# Patient Record
Sex: FEMALE | Race: OTHER | HISPANIC OR LATINO | Employment: OTHER | URBAN - METROPOLITAN AREA
[De-identification: names, ages, dates, MRNs, and addresses within clinical notes are randomized per-mention and may not be internally consistent; named-entity substitution may affect disease eponyms.]

---

## 2017-01-03 ENCOUNTER — OFFICE VISIT (OUTPATIENT)
Dept: MEDICAL GROUP | Facility: MEDICAL CENTER | Age: 67
End: 2017-01-03
Payer: COMMERCIAL

## 2017-01-03 VITALS
HEART RATE: 75 BPM | DIASTOLIC BLOOD PRESSURE: 78 MMHG | SYSTOLIC BLOOD PRESSURE: 122 MMHG | BODY MASS INDEX: 19.14 KG/M2 | WEIGHT: 104 LBS | HEIGHT: 62 IN | OXYGEN SATURATION: 98 % | TEMPERATURE: 99.2 F | RESPIRATION RATE: 14 BRPM

## 2017-01-03 DIAGNOSIS — K56.50 SMALL BOWEL OBSTRUCTION DUE TO ADHESIONS (HCC): ICD-10-CM

## 2017-01-03 DIAGNOSIS — Z09 HOSPITAL DISCHARGE FOLLOW-UP: ICD-10-CM

## 2017-01-03 DIAGNOSIS — R91.1 LUNG NODULE: ICD-10-CM

## 2017-01-03 DIAGNOSIS — D25.9 UTERINE LEIOMYOMA, UNSPECIFIED LOCATION: ICD-10-CM

## 2017-01-03 DIAGNOSIS — E87.1 HYPONATREMIA: ICD-10-CM

## 2017-01-03 DIAGNOSIS — D64.9 ANEMIA, UNSPECIFIED TYPE: ICD-10-CM

## 2017-01-03 DIAGNOSIS — K76.89 LIVER CYST: ICD-10-CM

## 2017-01-03 DIAGNOSIS — R10.30 ABDOMINAL PAIN, LOWER: ICD-10-CM

## 2017-01-03 DIAGNOSIS — Z00.00 HEALTH CARE MAINTENANCE: ICD-10-CM

## 2017-01-03 PROCEDURE — 99215 OFFICE O/P EST HI 40 MIN: CPT | Performed by: INTERNAL MEDICINE

## 2017-01-03 NOTE — MR AVS SNAPSHOT
"Alysa Tabatha Sarabia   1/3/2017 9:20 AM   Office Visit   MRN: [id number]    Department:  [place] [address]   Dept Phone:  [phone]    Description:  Female : 1950   Provider:  Darvin Brennan M.D.           Reason for Visit     Hospital Follow-up intestine obstruction       Allergies as of 1/3/2017     Allergen Noted Reactions    Diltiazem 2013   Rash    Skin rash      You were diagnosed with     Hospital discharge follow-up   [312089]       Small bowel obstruction due to adhesions (HCC)   [628945]       Anemia, unspecified type   [7723361]       Hyponatremia   [676870]       Health care maintenance   [096581]       Liver cyst   [496820]       Lung nodule   [189335]       Abdominal pain, lower   [829731]         Vital Signs     Blood Pressure Pulse Temperature Respirations Height Weight    122/78 mmHg 75 37.3 °C (99.2 °F) 14 1.575 m (5' 2\") 47.174 kg (104 lb)    Body Mass Index Oxygen Saturation Smoking Status             19.02 kg/m2 98% Never Smoker          Basic Information     Date Of Birth Sex Race Ethnicity Preferred Language    1950 Female  or   Origin (Beninese,North Korean,Slovak,Mauritian, etc) English      Problem List              ICD-10-CM Priority Class Noted - Resolved    Hyponatremia E87.1 Low  2012 - Present    Colon tumor D49.0 High  3/27/2014 - Present    Essential hypertension I10   9/3/2015 - Present    Colitis K52.9   2016 - Present    Small bowel obstruction due to adhesions (HCC) K56.5 Medium  5/3/2016 - Present    Protein-calorie malnutrition, severe (HCC) E43 High  2016 - Present    Ascites R18.8 Medium  2016 - Present    Hydronephrosis of right kidney N13.30 Medium  2016 - Present    Health care maintenance Z00.00   2016 - Present    Hyponatremia E87.1 Low  2016 - Present    Hypokalemia E87.6 Low  2016 - Present    Hyponatremia E87.1 Low  12/15/2016 - Present    Hypokalemia E87.6 Low  12/15/2016 - " Present      Health Maintenance        Date Due Completion Dates    PAP SMEAR 2/5/2016 2/5/2013 (N/S)    Override on 2/5/2013: (N/S)    MAMMOGRAM 8/1/2017 8/1/2016, 5/6/2015, 5/5/2014, 5/24/2013, 4/11/2013, 4/5/2012, 12/10/2010, 11/20/2009, 11/21/2008, 11/21/2008, 2/8/2008, 2/8/2008, 11/30/2007, 11/30/2007, 5/2/2007, 5/2/2007, 5/2/2007, 5/2/2007, 5/2/2007, 3/14/2007, 3/1/2007, 3/1/2007    COLONOSCOPY 12/15/2023 12/15/2013    IMM DTaP/Tdap/Td Vaccine (2 - Td) 7/31/2024 7/31/2014            Current Immunizations     Influenza TIV (IM) 3/27/2014  6:21 PM, 11/2/2012  4:36 PM    Influenza Vaccine Quad Inj (Pf) 12/5/2016  5:33 PM    Pneumococcal polysaccharide vaccine (PPSV-23) 12/15/2016  3:37 AM    SHINGLES VACCINE 11/5/2012  4:25 PM    Tdap Vaccine 7/31/2014      Below and/or attached are the medications your provider expects you to take. Review all of your home medications and newly ordered medications with your provider and/or pharmacist. Follow medication instructions as directed by your provider and/or pharmacist. Please keep your medication list with you and share with your provider. Update the information when medications are discontinued, doses are changed, or new medications (including over-the-counter products) are added; and carry medication information at all times in the event of emergency situations     Allergies:  DILTIAZEM - Rash               Medications  Valid as of: January 03, 2017 -  9:53 AM    Generic Name Brand Name Tablet Size Instructions for use    Aspirin (Tablet Delayed Response) ECOTRIN 325 MG Take 1 Tab by mouth every day.        Calcium   Take 1 Cap by mouth every day.        Cholecalciferol (Tab) cholecalciferol 1000 UNIT Take 1,000 Units by mouth every day.        Lutein   Take  by mouth.        Multiple Vitamin (Tab) THERAGRAN  Take 1 Tab by mouth every day.        Valsartan (Tab) DIOVAN 320 MG Take 1 Tab by mouth every day.        .                 Medicines prescribed today were [facility]  to:     [facility] [place] [place] - [address] [place] [address]    Phone: [phone] Fax: [phone]    Open 24 Hours?: No      Medication refill instructions:       If your prescription bottle indicates you have medication refills left, it is not necessary to call your provider’s office. Please contact your pharmacy and they will refill your medication.    If your prescription bottle indicates you do not have any refills left, you may request refills at any time through one of the following ways: The online [employer] system (except Urgent Care), by calling your provider’s office, or by asking your pharmacy to contact your provider’s office with a refill request. Medication refills are processed only during regular business hours and may not be available until the next business day. Your provider may request additional information or to have a follow-up visit with you prior to refilling your medication.   *Please Note: Medication refills are assigned a new Rx number when refilled electronically. Your pharmacy may indicate that no refills were authorized even though a new prescription for the same medication is available at the pharmacy. Please request the medicine by name with the pharmacy before contacting your provider for a refill.        Your To Do List     Future Labs/Procedures Complete By Expires    CBC WITH DIFFERENTIAL  As directed 1/4/2018    COMP METABOLIC PANEL  As directed 1/4/2018    CT-CHEST (THORAX) W/O  As directed 7/6/2017    URINALYSIS,CULTURE IF INDICATED  As directed 1/3/2018    US-ABDOMEN COMPLETE SURVEY  As directed 7/6/2017         [employer] Access Code: Activation code not generated  Current [employer] Status: Active

## 2017-01-03 NOTE — PROGRESS NOTES
CHIEF COMPLAINT  Chief Complaint   Patient presents with   • Hospital Follow-up     intestine obstruction     hospital discharge x 2 follow-up    HPI  Patient is a 66 y.o. female patient, accompanied by her so,n,  who presents today for the following     Recurrent small bowel obstruction due to adhesions  The patient was hospitalized twice in December 2016 due to intestinal obstruction    Hospitalization 1, from 12/3/16 to 12/8/16  · Partial small bowel obstruction, conservative treatment.  · IVF  · NG    · CT performed reviewed consistent with partial obstruction.   · Labs significant for  · Development of anemia, stable  · Development of hyponatremia, remained stable  · Imaging significant for:  · Calcified uterine fibroid  · Liver cysts  · 4 mm left low for lobe pulmonary nodule    Hospitalization 2 from 12/14/16  To 12/18/16   Diagnosed as:  1.  Small bowel obstruction due to adhesions on CT, abdomen  2. Paroxysmal anastomotic stricture  3.   - Laparotomy and adhesiolysis/dissection was performed on admission day  - Postoperative course was unremarkable,     - she was gradually switched from IV fluids to by mouth intake of fluids and foods    Reviewed previous pertinent history:  5/3 - CT imaging with multiple loops of small bowel encased in phlegmon and abscess in retroperitoneum.     5/4 - Small bowel resection and resection of previous ileocolostomy. Abraded small bowel spared to preserve bowel.     5/5 - Final cultures Viridans Streptococcus. Pathology with no evidence of malignancy. Completed 7 days of Ceftriaxone.    12/3 - CT imaging with dilated small bowel in the left abdomen likely represents partial small bowel obstruction.  12/5 Abdominal x-ray - No dilated bowel; Contrast within the bowel and bladder.    12/6 -  NGT removed. Clear liquid diet  12/7 - Advance diet.  General Surgery - Dr. Reddy      She remained stable after the discharge.   Currently, she complains of intermittent mild to moderate  right lower quadrant pain.  She has a regular BMs, and passing gas.   Denies vomiting.    Reviewed PMH, PSH, FH, SH, ALL, HCM/IMM, no changes  Reviewed MEDS, no changes    Patient Active Problem List    Diagnosis Date Noted   • Protein-calorie malnutrition, severe (HCC) 05/07/2016     Priority: High   • Colon tumor 03/27/2014     Priority: High   • Hydronephrosis of right kidney 05/13/2016     Priority: Medium   • Ascites 05/12/2016     Priority: Medium   • Small bowel obstruction due to adhesions (HCC) 05/03/2016     Priority: Medium   • Hyponatremia 12/15/2016     Priority: Low   • Hypokalemia 12/15/2016     Priority: Low   • Hyponatremia 12/06/2016     Priority: Low   • Hypokalemia 12/06/2016     Priority: Low   • Hyponatremia 11/01/2012     Priority: Low   • Health care maintenance 07/22/2016   • Colitis 04/23/2016   • Essential hypertension 09/03/2015     CURRENT MEDICATIONS  Current Outpatient Prescriptions   Medication Sig Dispense Refill   • LUTEIN PO Take  by mouth.     • valsartan (DIOVAN) 320 MG tablet Take 1 Tab by mouth every day. 30 Tab 0   • aspirin EC (ECOTRIN) 325 MG Tablet Delayed Response Take 1 Tab by mouth every day. 10 Tab 0   • CALCIUM PO Take 1 Cap by mouth every day.     • multivitamin (THERAGRAN) Tab Take 1 Tab by mouth every day.     • vitamin D (CHOLECALCIFEROL) 1000 UNIT TABS Take 1,000 Units by mouth every day.       No current facility-administered medications for this visit.     ALLERGIES  Allergies: Diltiazem  PAST MEDICAL HISTORY  Past Medical History   Diagnosis Date   • Hypertension    • Hyponatremia    • Small bowel obstruction (HCC)      SURGICAL HISTORY  She  has past surgical history that includes lumpectomy; breast biopsy (2007); colon resection laparoscopic (3/27/2014); exploratory laparotomy (5/3/2016); cystoscopy stent placement (Right, 5/13/2016); retrogrades (5/13/2016); and exploratory laparotomy (12/14/2016).  SOCIAL HISTORY  Social History   Substance Use Topics   •  "Smoking status: Never Smoker    • Smokeless tobacco: Never Used   • Alcohol Use: 4.2 oz/week     7 Standard drinks or equivalent per week      Comment: 4 per week     Social History     Social History Narrative    Lives with .     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  HENT: Negative for congestion, sore throat.  Eyes: Negative for blurred vision.   Respiratory: Negative for cough, shortness of breath.  Cardiovascular: Negative for chest pain, palpitations.   Gastrointestinal: Negative for heartburn, nausea, abdominal pain.   Genitourinary: Negative for dysuria.  Musculoskeletal: Negative for significant myalgias, back pain and joint pain.   Skin: Negative for rash and itching.   Neuro: Negative for dizziness, weakness and headaches.   Endo/Heme/Allergies: Does not bruise/bleed easily.   Psychiatric/Behavioral: Negative for depression, anxiety    PHYSICAL EXAM   Blood pressure 122/78, pulse 75, temperature 37.3 °C (99.2 °F), resp. rate 14, height 1.575 m (5' 2\"), weight 47.174 kg (104 lb), SpO2 98 %. Body mass index is 19.02 kg/(m^2).  General:  NAD, well appearing  HEENT:   NC/AT, PERRLA, EOMI, TMs are clear. Oropharyngeal mucosa is pink,  without lesions;  no cervical / supraclavicular  lymphadenopathy, no thyromegaly.    Cardiovascular: RRR.   No m/r/g. No carotid bruits .      Lungs:   CTAB, no w/r/r, no respiratory distress.  Abdomen: Soft, NT/ND + BS; no suprapubic tenderness; no masses or " hepatosplenomegaly.  Extremities:  2+ DP and radial pulses bilaterally.  No c/c/e.   Skin:  Warm, dry.  No erythema. No rash.   Neurologic: Alert & oriented x 3.  No focal deficits.  Psychiatric:  Affect normal, mood normal, judgment normal.    LABS     Labs are reviewed and discussed with a patient    Lab Results   Component Value Date/Time    CHOLESTEROL, 10/08/2016 08:07 AM    LDL 96 10/08/2016 08:07 AM    HDL 73 10/08/2016 08:07 AM    TRIGLYCERIDES 87 10/08/2016 08:07 AM       Lab Results   Component Value Date/Time    SODIUM 130* 12/16/2016 02:24 AM    POTASSIUM 3.7 12/16/2016 02:24 AM    CHLORIDE 99 12/16/2016 02:24 AM    CO2 28 12/16/2016 02:24 AM    GLUCOSE 123* 12/16/2016 02:24 AM    BUN 3* 12/16/2016 02:24 AM    CREATININE 0.36* 12/16/2016 02:24 AM     Lab Results   Component Value Date/Time    ALKALINE PHOSPHATASE 42 12/16/2016 02:24 AM    AST(SGOT) 13 12/16/2016 02:24 AM    ALT(SGPT) 11 12/16/2016 02:24 AM    TOTAL BILIRUBIN 0.6 12/16/2016 02:24 AM      No results found for: HBA1C  No results found for: TSH  No results found for: FREET4    Lab Results   Component Value Date/Time    WBC 6.7 12/16/2016 02:24 AM    RBC 2.94* 12/16/2016 02:24 AM    HEMOGLOBIN 9.2* 12/16/2016 02:24 AM    HEMATOCRIT 27.1* 12/16/2016 02:24 AM    MCV 92.2 12/16/2016 02:24 AM    MCH 31.3 12/16/2016 02:24 AM    MCHC 33.9 12/16/2016 02:24 AM    MPV 8.8* 12/16/2016 02:24 AM    NEUTROPHILS-POLYS 77.00* 12/16/2016 02:24 AM    LYMPHOCYTES 15.40* 12/16/2016 02:24 AM    MONOCYTES 6.60 12/16/2016 02:24 AM    EOSINOPHILS 0.30 12/16/2016 02:24 AM    BASOPHILS 0.30 12/16/2016 02:24 AM    ANISOCYTOSIS 1+ 05/05/2016 04:10 AM      IMAGING   Review the following imaging:    CT abdomen and pelvis, 12/3/16  Dilated small bowel in the left abdomen likely represents partial small bowel obstruction.    Wall thickening of small bowel in the right abdomen may may be infectious or inflammatory. Ischemia is thought to be less  likely.    Atherosclerotic plaque.    Calcified pelvic mass likely represents a calcified uterine fibroid.    Hypodense hepatic lesions likely represent cysts.    Postsurgical changes status post right hemicolectomy. Extensive postsurgical changes are seen in the abdomen.    Colonic diverticula.    Prominent periuterine veins can be seen in the setting of pelvic congestion syndrome.    4 mm ill-defined nodule at the left lung base is unchanged compared to 4/23/2016. Follow-up is recommended.    Groundglass opacities at the left lung base may be infectious or inflammatory.         CT abdomen, 12/14/16:  Dilated loops of small bowel again noted which could indicate partial small bowel obstruction. Air-fluid levels are also present.        2.   Post right hemicolectomy changes again noted.        3.  Enlarged uterus containing coarse calcifications again noted.        4.  No change in 4 mm nodule in left lung base.        5.  Small benign-appearing liver lesions again identified.            ASSESMENT AND PLAN        1. Hospital discharge follow-up  She remained stable, except some right lower abdominal pain.   Abdominal exam was benign, except some mild tenderness to palpation.   Advised to contact her surgeon due to this issue.     2. Small bowel obstruction due to adhesions (HCC)  As above    3. Anemia, unspecified type  Follow-up CBC  - CBC WITH DIFFERENTIAL; Future    4. Hyponatremia  Follow-up CMP  - COMP METABOLIC PANEL; Future    5. Health care maintenance  Addressed    6. Liver cyst  Follow-up in 3 months  - US-ABDOMEN COMPLETE SURVEY; Future    7. Lung nodule  Follow-up in 3 months  - CT-CHEST (THORAX) W/O; Future    8. Abdominal pain, lower  Advised to contact surgery and check UA  - URINALYSIS,CULTURE IF INDICATED; Future    9. Uterine leiomyoma, unspecified location  Calcified.  Counseling:   - Smoking:  Nonsmoker    Followup: Return in about 2 months (around 3/3/2017).    All questions are  answered.    Time spent 40 minutes face to face, with > 50% spent counseling and coordinating care.      Please note that this dictation was created using voice recognition software, and that there might be errors of ashok and possibly content.

## 2017-01-03 NOTE — Clinical Note
[facility]  Darvin Brennan M.D.  [address]  [place] [address]  Fax: [phone] Authorization for Release/Disclosure of Protected Health Information   Name: ALYSA SARABIA : 1950 SSN: [id number]   Address: [address] [place] [address]  [place] [address] Phone:    [phone] (home)    I authorize the entity listed below to release/disclose the PHI below to [facility]/Darvin Brennan M.D.   Provider or Entity Name:       Address   City, State, [facility]   Phone:      Fax:     Reason for request: continuity of care   Information to be released:    [  ] LAST COLONOSCOPY, including any PATH REPORT [  ] LAST DEXA  [  ] LAST MAMMOGRAM  [XXX  ] LAST PAP [  ] RETINA EXAM REPORT  [  ] IMMUNIZATION RECORDS  [  ] Release all info      [  ] Check here and initial the line next to each item to release ALL health information INCLUDING  _____ Care and treatment for drug and / or alcohol abuse  _____ HIV testing, infection status, or AIDS  _____ Genetic Testing    DATES OF SERVICE OR TIME PERIOD TO BE DISCLOSED: _____________  I understand and acknowledge that:  * This Authorization may be revoked at any time by you in writing, except if your health information has already been used or disclosed.  * Your health information that will be used or disclosed as a result of you signing this authorization could be re-disclosed by the recipient. If this occurs, your re-disclosed health information may no longer be protected by State or Federal laws.  * You may refuse to sign this Authorization. Your refusal will not affect your ability to obtain treatment.  * This Authorization becomes effective upon signing and will  on (date) __________. If no date is indicated, this Authorization will  one (1) year from the signature date.    Name: Alysa Sarabia    Signature:     Date: 1/3/2017

## 2017-01-03 NOTE — Clinical Note
[facility]  [address]  [facility] [place] [address]     January 3, 2017    Patient: Alysa Sarabia   YOB: 1950   Date of Visit: 1/3/2017                         To Whom It May Concern:    Alysa Sarabia was seen and treated in our department on 1/3/2017.     Excuse from work from today, 1/3/2017 until 1/16/2017.      Sincerely,       Darvin Brennan M.D.

## 2017-01-11 ENCOUNTER — HOSPITAL ENCOUNTER (OUTPATIENT)
Dept: LAB | Facility: MEDICAL CENTER | Age: 67
End: 2017-01-11
Attending: INTERNAL MEDICINE
Payer: COMMERCIAL

## 2017-01-11 DIAGNOSIS — D64.9 ANEMIA, UNSPECIFIED TYPE: ICD-10-CM

## 2017-01-11 DIAGNOSIS — R10.30 ABDOMINAL PAIN, LOWER: ICD-10-CM

## 2017-01-11 DIAGNOSIS — E87.1 HYPONATREMIA: ICD-10-CM

## 2017-01-11 LAB
ALBUMIN SERPL BCP-MCNC: 4.2 G/DL (ref 3.2–4.9)
ALBUMIN/GLOB SERPL: 1.4 G/DL
ALP SERPL-CCNC: 56 U/L (ref 30–99)
ALT SERPL-CCNC: 11 U/L (ref 2–50)
ANION GAP SERPL CALC-SCNC: 8 MMOL/L (ref 0–11.9)
APPEARANCE UR: CLEAR
AST SERPL-CCNC: 14 U/L (ref 12–45)
BASOPHILS # BLD AUTO: 0.05 K/UL (ref 0–0.12)
BASOPHILS NFR BLD AUTO: 1.5 % (ref 0–1.8)
BILIRUB SERPL-MCNC: 0.9 MG/DL (ref 0.1–1.5)
BILIRUB UR QL STRIP.AUTO: NEGATIVE
BUN SERPL-MCNC: 8 MG/DL (ref 8–22)
CALCIUM SERPL-MCNC: 9.4 MG/DL (ref 8.5–10.5)
CHLORIDE SERPL-SCNC: 100 MMOL/L (ref 96–112)
CO2 SERPL-SCNC: 28 MMOL/L (ref 20–33)
COLOR UR AUTO: YELLOW
CREAT SERPL-MCNC: 0.51 MG/DL (ref 0.5–1.4)
CULTURE IF INDICATED INDCX: NO UA CULTURE
EOSINOPHIL # BLD: 0.07 K/UL (ref 0–0.51)
EOSINOPHIL NFR BLD AUTO: 2.1 % (ref 0–6.9)
EPITHELIAL CELLS 1715: ABNORMAL /HPF
ERYTHROCYTE [DISTWIDTH] IN BLOOD BY AUTOMATED COUNT: 44.2 FL (ref 35.9–50)
GLOBULIN SER CALC-MCNC: 3.1 G/DL (ref 1.9–3.5)
GLUCOSE SERPL-MCNC: 88 MG/DL (ref 65–99)
GLUCOSE UR STRIP.AUTO-MCNC: NEGATIVE MG/DL
HCT VFR BLD AUTO: 39.6 % (ref 37–47)
HGB BLD-MCNC: 13.5 G/DL (ref 12–16)
IMM GRANULOCYTES # BLD AUTO: 0 K/UL (ref 0–0.11)
IMM GRANULOCYTES NFR BLD AUTO: 0 % (ref 0–0.9)
KETONES UR STRIP.AUTO-MCNC: NEGATIVE MG/DL
LEUKOCYTE ESTERASE UR QL STRIP.AUTO: NEGATIVE
LYMPHOCYTES # BLD: 1.22 K/UL (ref 1–4.8)
LYMPHOCYTES NFR BLD AUTO: 37.2 % (ref 22–41)
MCH RBC QN AUTO: 32.1 PG (ref 27–33)
MCHC RBC AUTO-ENTMCNC: 34.1 G/DL (ref 33.6–35)
MCV RBC AUTO: 94.3 FL (ref 81.4–97.8)
MICRO URNS: ABNORMAL
MONOCYTES # BLD: 0.25 K/UL (ref 0–0.85)
MONOCYTES NFR BLD AUTO: 7.6 % (ref 0–13.4)
NEUTROPHILS # BLD: 1.69 K/UL (ref 2–7.15)
NEUTROPHILS NFR BLD AUTO: 51.6 % (ref 44–72)
NITRITE UR QL STRIP.AUTO: NEGATIVE
NRBC # BLD AUTO: 0 K/UL
NRBC BLD-RTO: 0 /100 WBC
PH UR: 6.5 [PH]
PLATELET # BLD AUTO: 424 K/UL (ref 164–446)
PMV BLD AUTO: 9 FL (ref 9–12.9)
POTASSIUM SERPL-SCNC: 4.1 MMOL/L (ref 3.6–5.5)
PROT SERPL-MCNC: 7.3 G/DL (ref 6–8.2)
PROT UR QL STRIP: NEGATIVE MG/DL
RBC # BLD AUTO: 4.2 M/UL (ref 4.2–5.4)
RBC #/AREA URNS HPF: ABNORMAL /HPF
RBC UR QL AUTO: ABNORMAL
SODIUM SERPL-SCNC: 136 MMOL/L (ref 135–145)
SP GR UR STRIP.AUTO: 1.01
WBC # BLD AUTO: 3.3 K/UL (ref 4.8–10.8)
WBC #/AREA URNS HPF: ABNORMAL /HPF

## 2017-01-11 PROCEDURE — 81001 URINALYSIS AUTO W/SCOPE: CPT

## 2017-01-11 PROCEDURE — 85025 COMPLETE CBC W/AUTO DIFF WBC: CPT

## 2017-01-11 PROCEDURE — 80053 COMPREHEN METABOLIC PANEL: CPT

## 2017-01-11 PROCEDURE — 36415 COLL VENOUS BLD VENIPUNCTURE: CPT

## 2017-01-12 DIAGNOSIS — D70.9 NEUTROPENIA, UNSPECIFIED TYPE (HCC): ICD-10-CM

## 2017-01-16 DIAGNOSIS — D72.819 LEUKOPENIA, UNSPECIFIED TYPE: ICD-10-CM

## 2017-01-23 RX ORDER — VALSARTAN 320 MG/1
TABLET ORAL
Qty: 90 TAB | Refills: 0 | Status: SHIPPED | OUTPATIENT
Start: 2017-01-23 | End: 2017-03-29 | Stop reason: SDUPTHER

## 2017-01-23 NOTE — TELEPHONE ENCOUNTER
Was the patient seen in the last year in this department? Yes     Does patient have an active prescription for medications requested? Yes     Received Request Via: Pharmacy    Last office visit: 01/03/2017   Last labs: 01/11/2017

## 2017-02-04 ENCOUNTER — HOSPITAL ENCOUNTER (OUTPATIENT)
Dept: RADIOLOGY | Facility: MEDICAL CENTER | Age: 67
End: 2017-02-04
Attending: INTERNAL MEDICINE
Payer: COMMERCIAL

## 2017-02-04 DIAGNOSIS — R91.1 LUNG NODULE: ICD-10-CM

## 2017-02-04 DIAGNOSIS — K76.89 LIVER CYST: ICD-10-CM

## 2017-02-04 PROCEDURE — 71250 CT THORAX DX C-: CPT

## 2017-02-04 PROCEDURE — 76700 US EXAM ABDOM COMPLETE: CPT

## 2017-03-01 ENCOUNTER — HOSPITAL ENCOUNTER (OUTPATIENT)
Dept: LAB | Facility: MEDICAL CENTER | Age: 67
End: 2017-03-01
Attending: INTERNAL MEDICINE
Payer: COMMERCIAL

## 2017-03-01 DIAGNOSIS — D70.9 NEUTROPENIA, UNSPECIFIED TYPE (HCC): ICD-10-CM

## 2017-03-01 LAB
BASOPHILS # BLD AUTO: 0.04 K/UL (ref 0–0.12)
BASOPHILS NFR BLD AUTO: 1.3 % (ref 0–1.8)
EOSINOPHIL # BLD: 0.06 K/UL (ref 0–0.51)
EOSINOPHIL NFR BLD AUTO: 1.9 % (ref 0–6.9)
ERYTHROCYTE [DISTWIDTH] IN BLOOD BY AUTOMATED COUNT: 42.6 FL (ref 35.9–50)
HCT VFR BLD AUTO: 41.1 % (ref 37–47)
HGB BLD-MCNC: 13.5 G/DL (ref 12–16)
IMM GRANULOCYTES # BLD AUTO: 0.02 K/UL (ref 0–0.11)
IMM GRANULOCYTES NFR BLD AUTO: 0.6 % (ref 0–0.9)
LYMPHOCYTES # BLD: 1.19 K/UL (ref 1–4.8)
LYMPHOCYTES NFR BLD AUTO: 37.5 % (ref 22–41)
MCH RBC QN AUTO: 30.5 PG (ref 27–33)
MCHC RBC AUTO-ENTMCNC: 32.8 G/DL (ref 33.6–35)
MCV RBC AUTO: 93 FL (ref 81.4–97.8)
MONOCYTES # BLD: 0.24 K/UL (ref 0–0.85)
MONOCYTES NFR BLD AUTO: 7.6 % (ref 0–13.4)
NEUTROPHILS # BLD: 1.62 K/UL (ref 2–7.15)
NEUTROPHILS NFR BLD AUTO: 51.1 % (ref 44–72)
NRBC # BLD AUTO: 0 K/UL
NRBC BLD-RTO: 0 /100 WBC
PLATELET # BLD AUTO: 391 K/UL (ref 164–446)
PMV BLD AUTO: 9.1 FL (ref 9–12.9)
RBC # BLD AUTO: 4.42 M/UL (ref 4.2–5.4)
WBC # BLD AUTO: 3.2 K/UL (ref 4.8–10.8)

## 2017-03-01 PROCEDURE — 36415 COLL VENOUS BLD VENIPUNCTURE: CPT

## 2017-03-01 PROCEDURE — 85025 COMPLETE CBC W/AUTO DIFF WBC: CPT

## 2017-03-02 ENCOUNTER — TELEPHONE (OUTPATIENT)
Dept: MEDICAL GROUP | Facility: MEDICAL CENTER | Age: 67
End: 2017-03-02

## 2017-03-02 NOTE — TELEPHONE ENCOUNTER
----- Message from Libertad Villa PA-C sent at 3/1/2017  5:58 PM PST -----  Please inform Dr. Humphrey's pt that her labs are still the same, not much change so we should check them again in 1 more month.   Labs were ordered.   Libertad

## 2017-03-06 ENCOUNTER — OFFICE VISIT (OUTPATIENT)
Dept: MEDICAL GROUP | Facility: MEDICAL CENTER | Age: 67
End: 2017-03-06
Payer: COMMERCIAL

## 2017-03-06 VITALS
DIASTOLIC BLOOD PRESSURE: 64 MMHG | HEIGHT: 62 IN | WEIGHT: 100.75 LBS | SYSTOLIC BLOOD PRESSURE: 116 MMHG | BODY MASS INDEX: 18.54 KG/M2 | TEMPERATURE: 98.4 F | HEART RATE: 69 BPM | OXYGEN SATURATION: 95 %

## 2017-03-06 DIAGNOSIS — Z00.00 HEALTH CARE MAINTENANCE: ICD-10-CM

## 2017-03-06 DIAGNOSIS — N13.30 HYDRONEPHROSIS OF RIGHT KIDNEY: ICD-10-CM

## 2017-03-06 DIAGNOSIS — R91.1 LUNG NODULE: ICD-10-CM

## 2017-03-06 DIAGNOSIS — D70.9 NEUTROPENIA, UNSPECIFIED TYPE (HCC): ICD-10-CM

## 2017-03-06 DIAGNOSIS — K76.89 LIVER CYST: ICD-10-CM

## 2017-03-06 PROBLEM — Z87.19 HISTORY OF SMALL BOWEL OBSTRUCTION: Status: ACTIVE | Noted: 2017-03-06

## 2017-03-06 PROCEDURE — 99214 OFFICE O/P EST MOD 30 MIN: CPT | Performed by: INTERNAL MEDICINE

## 2017-03-06 NOTE — MR AVS SNAPSHOT
"        Alysa Sarabia   3/6/2017 9:20 AM   Office Visit   MRN: [id number]    Department:  [place] [address]   Dept Phone:  [phone]    Description:  Female : 1950   Provider:  Darvin Brennan M.D.           Reason for Visit     Follow-Up lab/ imaging results       Allergies as of 3/6/2017     Allergen Noted Reactions    Diltiazem 2013   Rash    Skin rash      You were diagnosed with     Neutropenia, unspecified type (CMS-HCC)   [8386581]       Essential hypertension   [2989281]       Lung nodule   [056913]       Liver cyst   [773468]       History of small bowel obstruction   [619956]       Health care maintenance   [961328]       Hydronephrosis of right kidney   [611211]         Vital Signs     Blood Pressure Pulse Temperature Height Weight Body Mass Index    116/64 mmHg 69 36.9 °C (98.4 °F) 1.575 m (5' 2\") 45.7 kg (100 lb 12 oz) 18.42 kg/m2    Oxygen Saturation Smoking Status                95% Never Smoker           Basic Information     Date Of Birth Sex Race Ethnicity Preferred Language    1950 Female  or   Origin (Azeri,Guyanese,Nauruan,Lao, etc) English      Your appointments     2017  1:00 PM   Established Patient with Darvin Brennan M.D.   [facility]    [address]  [address]  [place] [id number] [phone]           You will be receiving a confirmation call a few days before your appointment from our automated call confirmation system.              Problem List              ICD-10-CM Priority Class Noted - Resolved    Colon tumor D49.0 High  3/27/2014 - Present    Essential hypertension I10   9/3/2015 - Present    Ascites R18.8 Medium  2016 - Present    Hydronephrosis of right kidney N13.30 Medium  2016 - Present    Health care maintenance Z00.00   2016 - Present    Uterine leiomyoma D25.9   1/3/2017 - Present    History of small bowel obstruction Z87.19   " 3/6/2017 - Present      Health Maintenance        Date Due Completion Dates    PAP SMEAR 2/5/2016 2/5/2013 (N/S)    Override on 2/5/2013: (N/S)    MAMMOGRAM 8/1/2017 8/1/2016, 5/6/2015, 5/5/2014, 4/11/2013, 4/5/2012, 12/10/2010, 11/20/2009, 11/21/2008, 11/21/2008, 2/8/2008, 2/8/2008, 11/30/2007, 11/30/2007, 3/14/2007, 3/1/2007, 3/1/2007    COLONOSCOPY 12/15/2023 12/15/2013    IMM DTaP/Tdap/Td Vaccine (2 - Td) 7/31/2024 7/31/2014            Current Immunizations     Influenza TIV (IM) 3/27/2014  6:21 PM, 11/2/2012  4:36 PM    Influenza Vaccine Quad Inj (Pf) 12/5/2016  5:33 PM    Pneumococcal polysaccharide vaccine (PPSV-23) 12/15/2016  3:37 AM    SHINGLES VACCINE 11/5/2012  4:25 PM    Tdap Vaccine 7/31/2014      Below and/or attached are the medications your provider expects you to take. Review all of your home medications and newly ordered medications with your provider and/or pharmacist. Follow medication instructions as directed by your provider and/or pharmacist. Please keep your medication list with you and share with your provider. Update the information when medications are discontinued, doses are changed, or new medications (including over-the-counter products) are added; and carry medication information at all times in the event of emergency situations     Allergies:  DILTIAZEM - Rash               Medications  Valid as of: March 06, 2017 - 10:06 AM    Generic Name Brand Name Tablet Size Instructions for use    Aspirin (Tablet Delayed Response) ECOTRIN 325 MG Take 1 Tab by mouth every day.        Calcium   Take 1 Cap by mouth every day.        Cholecalciferol (Tab) cholecalciferol 1000 UNIT Take 1,000 Units by mouth every day.        Lutein   Take  by mouth.        Multiple Vitamin (Tab) THERAGRAN  Take 1 Tab by mouth every day.        Valsartan (Tab) DIOVAN 320 MG TAKE ONE TABLET BY MOUTH EVERY DAY        .                 Medicines prescribed today were sent to:     [facility] -  [address]    [address] [address]    Phone: [phone] Fax: [phone]    Open 24 Hours?: No      Medication refill instructions:       If your prescription bottle indicates you have medication refills left, it is not necessary to call your provider’s office. Please contact your pharmacy and they will refill your medication.    If your prescription bottle indicates you do not have any refills left, you may request refills at any time through one of the following ways: The online [employer] system (except Urgent Care), by calling your provider’s office, or by asking your pharmacy to contact your provider’s office with a refill request. Medication refills are processed only during regular business hours and may not be available until the next business day. Your provider may request additional information or to have a follow-up visit with you prior to refilling your medication.   *Please Note: Medication refills are assigned a new Rx number when refilled electronically. Your pharmacy may indicate that no refills were authorized even though a new prescription for the same medication is available at the pharmacy. Please request the medicine by name with the pharmacy before contacting your provider for a refill.        Your To Do List     Future Labs/Procedures Complete By Expires    BASIC METABOLIC PANEL  As directed 3/7/2018    CBC WITH DIFFERENTIAL  As directed 3/7/2018    US-ABDOMEN COMPLETE SURVEY  As directed 9/6/2017      Referral     A referral request has been sent to our patient care coordination department. Please allow 3-5 business days for us to process this request and contact you either by phone or mail. If you do not hear from us by the 5th business day, please call us at [phone].           BevSpot Access Code: Activation code not generated  Current BevSpot Status: Active

## 2017-03-06 NOTE — Clinical Note
[facility]  Darvin Brennan M.D.  [address]  [place] [address]  Fax: [phone]   Authorization for Release/Disclosure of   Protected Health Information   Name: ALYSA SARABIA : 1950 SSN: [id number]   Address: [address] [place] [address]  [place] [address] Phone:    [phone] (home)    I authorize the entity listed below to release/disclose the PHI below to:   [facility]/Darvin Brennan M.D. and Darvin Brennan M.D.   Provider or Entity Name:     Address   City, State, Zip   Phone:      Fax:     Reason for request: continuity of care   Information to be released:    [  ] LAST COLONOSCOPY,  including any PATH REPORT and follow-up  [  ] LAST FIT/COLOGUARD RESULT [  ] LAST DEXA  [  ] LAST MAMMOGRAM  [  ] LAST PAP  [  ] LAST LABS [  ] RETINA EXAM REPORT  [  ] IMMUNIZATION RECORDS  [  ] Release all info      [  ] Check here and initial the line next to each item to release ALL health information INCLUDING  _____ Care and treatment for drug and / or alcohol abuse  _____ HIV testing, infection status, or AIDS  _____ Genetic Testing    DATES OF SERVICE OR TIME PERIOD TO BE DISCLOSED: _____________  I understand and acknowledge that:  * This Authorization may be revoked at any time by you in writing, except if your health information has already been used or disclosed.  * Your health information that will be used or disclosed as a result of you signing this authorization could be re-disclosed by the recipient. If this occurs, your re-disclosed health information may no longer be protected by State or Federal laws.  * You may refuse to sign this Authorization. Your refusal will not affect your ability to obtain treatment.  * This Authorization becomes effective upon signing and will  on (date) __________.      If no date is indicated, this Authorization will  one (1) year from the signature date.    Name: Alysa Sarabia    Signature:   Date:      3/6/2017       PLEASE FAX REQUESTED RECORDS BACK TO: [phone]

## 2017-03-06 NOTE — PROGRESS NOTES
CHIEF COMPLAINT  Chief Complaint   Patient presents with   • Follow-Up     lab/ imaging results      HPI  Patient is a 66 y.o. female patient who presents today for the following     Neutropenia  The patient was found to have slight now neutropenia, with a decreased neutrophil count in the last two CBCs.   She has not had frequent ear infections.     Lung nodule  She has h/o lung nodules, with the last CT chest on 2/4/17, reviewed, as bellow.   She is never smoker.   No PMH of malignancy.   No FH of lung cancer.     Liver cyst  She has h/o liver cyst, with the last US abdomen on 2/4/17.   Denies RUQ pain.     Hydronephrosis of the right kidney  Incidental finding on US abdomen on 5/16/2016, improving.    She was evaluated by urology, the last  f/u was in 9/2016.    Denies flank pain, hematuria, change in urinary habits.   She was evaluated by urology, the last OV was in 9/2016.    Reviewed PMH, PSH, FH, SH, ALL, HCM/IMM, no changes  Reviewed MEDS, no changes    Patient Active Problem List    Diagnosis Date Noted   • Colon tumor 03/27/2014     Priority: High   • Hydronephrosis of right kidney 05/13/2016     Priority: Medium   • Ascites 05/12/2016     Priority: Medium   • History of small bowel obstruction 03/06/2017   • Uterine leiomyoma 01/03/2017   • Health care maintenance 07/22/2016   • Essential hypertension 09/03/2015     CURRENT MEDICATIONS  Current Outpatient Prescriptions   Medication Sig Dispense Refill   • valsartan (DIOVAN) 320 MG tablet TAKE ONE TABLET BY MOUTH EVERY DAY 90 Tab 0   • LUTEIN PO Take  by mouth.     • CALCIUM PO Take 1 Cap by mouth every day.     • multivitamin (THERAGRAN) Tab Take 1 Tab by mouth every day.     • vitamin D (CHOLECALCIFEROL) 1000 UNIT TABS Take 1,000 Units by mouth every day.     • aspirin EC (ECOTRIN) 325 MG Tablet Delayed Response Take 1 Tab by mouth every day. 10 Tab 0     No current facility-administered medications for this visit.     ALLERGIES  Allergies:  "Diltiazem  PAST MEDICAL HISTORY  Past Medical History   Diagnosis Date   • Hypertension    • Hyponatremia    • Small bowel obstruction (CMS-HCC)      SURGICAL HISTORY  She  has past surgical history that includes lumpectomy; breast biopsy (2007); colon resection laparoscopic (3/27/2014); exploratory laparotomy (5/3/2016); cystoscopy stent placement (Right, 5/13/2016); retrogrades (5/13/2016); and exploratory laparotomy (12/14/2016).  SOCIAL HISTORY  Social History   Substance Use Topics   • Smoking status: Never Smoker    • Smokeless tobacco: Never Used   • Alcohol Use: 4.2 oz/week     7 Standard drinks or equivalent per week      Comment: 4 per week     Social History     Social History Narrative    Lives with .     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  HENT: Negative for congestion, sore throat.  Eyes: Negative for blurred vision.   Respiratory: Negative for cough, shortness of breath.  Cardiovascular: Negative for chest pain, palpitations.   Gastrointestinal: Negative for heartburn, nausea, abdominal pain.   Genitourinary: Negative for dysuria. And per HPI.  Musculoskeletal: Negative for significant myalgias, back pain and joint pain.   Skin: Negative for rash and itching.   Neuro: Negative for dizziness, weakness and headaches.   Endo/Heme/Allergies: Does not bruise/bleed easily.   Psychiatric/Behavioral: Negative for depression, anxiety    PHYSICAL EXAM   Blood pressure 116/64, pulse 69, temperature 36.9 °C (98.4 °F), height 1.575 m (5' 2\"), " weight 45.7 kg (100 lb 12 oz), SpO2 95 %. Body mass index is 18.42 kg/(m^2).  General:  NAD, well appearing  HEENT:   NC/AT, PERRLA, EOMI, TMs are clear. Oropharyngeal mucosa is pink,  without lesions;  no cervical / supraclavicular  lymphadenopathy, no thyromegaly.    Cardiovascular: RRR.   No m/r/g. No carotid bruits .      Lungs:   CTAB, no w/r/r, no respiratory distress.  Abdomen: Soft, NT/ND + BS; no suprapubic tenderness; no masses or hepatosplenomegaly.  Extremities:  2+ DP and radial pulses bilaterally.  No c/c/e.   Skin:  Warm, dry.  No erythema. No rash.   Neurologic: Alert & oriented x 3. No focal deficits.  Psychiatric:  Affect normal, mood normal, judgment normal.    LABS     Labs are reviewed and discussed with a patient    Lab Results   Component Value Date/Time    CHOLESTEROL, 10/08/2016 08:07 AM    LDL 96 10/08/2016 08:07 AM    HDL 73 10/08/2016 08:07 AM    TRIGLYCERIDES 87 10/08/2016 08:07 AM       Lab Results   Component Value Date/Time    SODIUM 136 01/11/2017 07:43 AM    POTASSIUM 4.1 01/11/2017 07:43 AM    CHLORIDE 100 01/11/2017 07:43 AM    CO2 28 01/11/2017 07:43 AM    GLUCOSE 88 01/11/2017 07:43 AM    BUN 8 01/11/2017 07:43 AM    CREATININE 0.51 01/11/2017 07:43 AM     Lab Results   Component Value Date/Time    ALKALINE PHOSPHATASE 56 01/11/2017 07:43 AM    AST(SGOT) 14 01/11/2017 07:43 AM    ALT(SGPT) 11 01/11/2017 07:43 AM    TOTAL BILIRUBIN 0.9 01/11/2017 07:43 AM      No results found for: HBA1C  No results found for: TSH  No results found for: FREET4    Lab Results   Component Value Date/Time    WBC 3.2* 03/01/2017 07:07 AM    RBC 4.42 03/01/2017 07:07 AM    HEMOGLOBIN 13.5 03/01/2017 07:07 AM    HEMATOCRIT 41.1 03/01/2017 07:07 AM    MCV 93.0 03/01/2017 07:07 AM    MCH 30.5 03/01/2017 07:07 AM    MCHC 32.8* 03/01/2017 07:07 AM    MPV 9.1 03/01/2017 07:07 AM    NEUTROPHILS-POLYS 51.10 03/01/2017 07:07 AM    LYMPHOCYTES 37.50 03/01/2017 07:07 AM    MONOCYTES 7.60 03/01/2017 07:07  AM    EOSINOPHILS 1.90 03/01/2017 07:07 AM    BASOPHILS 1.30 03/01/2017 07:07 AM    ANISOCYTOSIS 1+ 05/05/2016 04:10 AM        IMAGING     Reviewed the following imaging:    CT chest, 2/4/2017:  Bilateral noncalcified pulmonary nodules, largest of which is 5 mm in diameter.    Ultrasound abdomen, 2/4/17:  1.  Small septated cyst in the LEFT lobe liver, corresponding to CT findings.  2.  Small polyps versus adherent nonshadowing stones in the gallbladder, without evidence for cholecystitis or biliary obstruction.  3.  Dilated RIGHT renal pelvis, with minimal hydronephrosis which appears improved from prior exam.    ASSESMENT AND PLAN        1. Neutropenia, unspecified type (CMS-HCC)  Mild, will be followed.  - CBC WITH DIFFERENTIAL; Future    2. Lung nodule  - REFERRAL TO PULMONOLOGY    3. Liver cyst  - REFERRAL TO GASTROENTEROLOGY  - US-ABDOMEN COMPLETE SURVEY; Future    4. Hydronephrosis of right kidney  - US-ABDOMEN COMPLETE SURVEY; Future  - BASIC METABOLIC PANEL; Future    5. Health care maintenance  Advised to schedule PAP smear.     Counseling:   - Smoking:  Nonsmoker    Followup: Return in about 3 months (around 6/6/2017) for Short.    All questions are answered.    Please note that this dictation was created using voice recognition software, and that there might be errors of ashok and possibly content.

## 2017-03-06 NOTE — Clinical Note
[facility]  Darvin Brennan M.D.  [address]  [place] [address]  Fax: [phone]   Authorization for Release/Disclosure of   Protected Health Information   Name: ALYSA SARABIA : 1950 SSN: [id number]   Address: [address] [place] [address]  [place] [address] Phone:    [phone] (home)    I authorize the entity listed below to release/disclose the PHI below to:   [facility]/Darvin Brennan M.D. and Darvin Brennan M.D.   Provider or Entity Name:     Address   City, State, Zip   Phone:      Fax:     Reason for request: continuity of care   Information to be released:    [  ] LAST COLONOSCOPY,  including any PATH REPORT and follow-up  [  ] LAST FIT/COLOGUARD RESULT [  ] LAST DEXA  [  ] LAST MAMMOGRAM  [  ] LAST PAP  [  ] LAST LABS [  ] RETINA EXAM REPORT  [  ] IMMUNIZATION RECORDS  [  ] Release all info      [  ] Check here and initial the line next to each item to release ALL health information INCLUDING  _____ Care and treatment for drug and / or alcohol abuse  _____ HIV testing, infection status, or AIDS  _____ Genetic Testing    DATES OF SERVICE OR TIME PERIOD TO BE DISCLOSED: _____________  I understand and acknowledge that:  * This Authorization may be revoked at any time by you in writing, except if your health information has already been used or disclosed.  * Your health information that will be used or disclosed as a result of you signing this authorization could be re-disclosed by the recipient. If this occurs, your re-disclosed health information may no longer be protected by State or Federal laws.  * You may refuse to sign this Authorization. Your refusal will not affect your ability to obtain treatment.  * This Authorization becomes effective upon signing and will  on (date) __________.      If no date is indicated, this Authorization will  one (1) year from the signature date.    Name: Alysa Sarabia    Signature:   Date:      3/6/2017       PLEASE FAX REQUESTED RECORDS BACK TO: [phone]

## 2017-03-29 RX ORDER — VALSARTAN 320 MG/1
TABLET ORAL
Qty: 90 TAB | Refills: 0 | Status: SHIPPED | OUTPATIENT
Start: 2017-03-29 | End: 2017-07-19 | Stop reason: SDUPTHER

## 2017-04-22 ENCOUNTER — HOSPITAL ENCOUNTER (OUTPATIENT)
Dept: RADIOLOGY | Facility: MEDICAL CENTER | Age: 67
End: 2017-04-22
Attending: INTERNAL MEDICINE
Payer: COMMERCIAL

## 2017-04-22 DIAGNOSIS — K76.89 LIVER CYST: ICD-10-CM

## 2017-04-22 DIAGNOSIS — N13.30 HYDRONEPHROSIS OF RIGHT KIDNEY: ICD-10-CM

## 2017-04-22 PROCEDURE — 76700 US EXAM ABDOM COMPLETE: CPT

## 2017-05-08 ENCOUNTER — TELEPHONE (OUTPATIENT)
Dept: PULMONOLOGY | Facility: HOSPICE | Age: 67
End: 2017-05-08

## 2017-05-30 ENCOUNTER — TELEPHONE (OUTPATIENT)
Dept: PULMONOLOGY | Facility: HOSPICE | Age: 67
End: 2017-05-30

## 2017-05-30 DIAGNOSIS — R06.00 DYSPNEA, UNSPECIFIED TYPE: ICD-10-CM

## 2017-06-14 ENCOUNTER — NON-PROVIDER VISIT (OUTPATIENT)
Dept: PULMONOLOGY | Facility: HOSPICE | Age: 67
End: 2017-06-14
Payer: COMMERCIAL

## 2017-06-14 ENCOUNTER — OFFICE VISIT (OUTPATIENT)
Dept: PULMONOLOGY | Facility: HOSPICE | Age: 67
End: 2017-06-14
Payer: COMMERCIAL

## 2017-06-14 VITALS
HEART RATE: 76 BPM | WEIGHT: 100 LBS | BODY MASS INDEX: 18.4 KG/M2 | OXYGEN SATURATION: 96 % | DIASTOLIC BLOOD PRESSURE: 82 MMHG | RESPIRATION RATE: 16 BRPM | SYSTOLIC BLOOD PRESSURE: 122 MMHG | TEMPERATURE: 97.5 F | HEIGHT: 62 IN

## 2017-06-14 DIAGNOSIS — R91.1 INCIDENTAL LUNG NODULE, > 3MM AND < 8MM: ICD-10-CM

## 2017-06-14 DIAGNOSIS — R06.00 DYSPNEA, UNSPECIFIED TYPE: ICD-10-CM

## 2017-06-14 PROCEDURE — 94729 DIFFUSING CAPACITY: CPT | Performed by: INTERNAL MEDICINE

## 2017-06-14 PROCEDURE — 94726 PLETHYSMOGRAPHY LUNG VOLUMES: CPT | Performed by: INTERNAL MEDICINE

## 2017-06-14 PROCEDURE — 94060 EVALUATION OF WHEEZING: CPT | Performed by: INTERNAL MEDICINE

## 2017-06-14 PROCEDURE — 99203 OFFICE O/P NEW LOW 30 MIN: CPT | Performed by: INTERNAL MEDICINE

## 2017-06-14 ASSESSMENT — PULMONARY FUNCTION TESTS
FEV1/FVC_PERCENT_PREDICTED: 96
FVC: 2.59
FEV1_PERCENT_PREDICTED: 94
FVC_PREDICTED: 2.85
FVC_PERCENT_PREDICTED: 90
FVC_PERCENT_PREDICTED: 101
FEV1: 2.1
FEV1_PREDICTED: 2.23
FEV1_PERCENT_PREDICTED: 97
FEV1_PERCENT_CHANGE: 12
FEV1/FVC_PERCENT_PREDICTED: 78
FEV1_PERCENT_CHANGE: 3
FVC: 2.9
FEV1/FVC_PERCENT_CHANGE: 25
FEV1/FVC: 75.17
FEV1/FVC_PERCENT_PREDICTED: 104
FEV1: 2.18
FEV1/FVC: 81

## 2017-06-14 NOTE — MR AVS SNAPSHOT
"        Alysa Sarabia   2017 1:00 PM   Office Visit   MRN: [id number]    Department:  [facility]   Dept Phone:  [phone]    Description:  Female : 1950   Provider:  Morgan Dominguez M.D.           Reason for Visit     Establish Care           Allergies as of 2017     Allergen Noted Reactions    Diltiazem 2013   Rash    Skin rash      Vital Signs     Blood Pressure Pulse Temperature Respirations Height Weight    122/82 mmHg 76 36.4 °C (97.5 °F) 16 1.575 m (5' 2.01\") 45.36 kg (100 lb)    Body Mass Index Oxygen Saturation Smoking Status             18.29 kg/m2 96% Never Smoker          Basic Information     Date Of Birth Sex Race Ethnicity Preferred Language    1950 Female  or   Origin (Azeri,Martiniquais,Armenian,South Korean, etc) English      Your appointments     2017  8:00 AM   Adult Draw/Collection with [facility] (--)    [address]  [place] [address]   [phone]            2017  1:00 PM   Established Patient with Darvin Brennan M.D.   [facility]    [address]  [address]  [place] [id number] [phone]           You will be receiving a confirmation call a few days before your appointment from our automated call confirmation system.            Jul 10, 2017 10:00 AM   MR PEREZ WITH/WO with [facility] MRI 1   [facility] - [facility] - [facility] ([place]    [address]  [place] [address] [phone]              Problem List              ICD-10-CM Priority Class Noted - Resolved    Colon tumor D49.0 High  3/27/2014 - Present    Essential hypertension I10   9/3/2015 - Present    Ascites R18.8 Medium  2016 - Present    Hydronephrosis of right kidney N13.30 Medium  2016 - Present    Health care maintenance Z00.00   2016 - Present    Uterine leiomyoma D25.9   1/3/2017 - Present    History of small bowel obstruction Z87.19   " 3/6/2017 - Present      Health Maintenance        Date Due Completion Dates    PAP SMEAR 2/5/2016 2/5/2013 (N/S)    Override on 2/5/2013: (N/S)    MAMMOGRAM 8/1/2017 8/1/2016, 5/6/2015, 5/5/2014, 4/11/2013, 4/5/2012, 12/10/2010, 11/20/2009, 11/21/2008, 11/21/2008, 2/8/2008, 2/8/2008, 11/30/2007, 11/30/2007, 3/14/2007, 3/1/2007, 3/1/2007    COLONOSCOPY 12/15/2023 12/15/2013    IMM DTaP/Tdap/Td Vaccine (2 - Td) 7/31/2024 7/31/2014            Current Immunizations     Influenza TIV (IM) 3/27/2014  6:21 PM, 11/2/2012  4:36 PM    Influenza Vaccine Quad Inj (Pf) 12/5/2016  5:33 PM    Pneumococcal polysaccharide vaccine (PPSV-23) 12/15/2016  3:37 AM    SHINGLES VACCINE 11/5/2012  4:25 PM    Tdap Vaccine 7/31/2014      Below and/or attached are the medications your provider expects you to take. Review all of your home medications and newly ordered medications with your provider and/or pharmacist. Follow medication instructions as directed by your provider and/or pharmacist. Please keep your medication list with you and share with your provider. Update the information when medications are discontinued, doses are changed, or new medications (including over-the-counter products) are added; and carry medication information at all times in the event of emergency situations     Allergies:  DILTIAZEM - Rash               Medications  Valid as of: June 14, 2017 -  1:16 PM    Generic Name Brand Name Tablet Size Instructions for use    Aspirin (Tablet Delayed Response) ECOTRIN 325 MG Take 1 Tab by mouth every day.        Calcium   Take 1 Cap by mouth every day.        Cholecalciferol (Tab) cholecalciferol 1000 UNIT Take 1,000 Units by mouth every day.        Lutein   Take  by mouth.        Multiple Vitamin (Tab) THERAGRAN  Take 1 Tab by mouth every day.        Valsartan (Tab) DIOVAN 320 MG TAKE ONE TABLET BY MOUTH EVERY DAY        .                 Medicines prescribed today were sent to:     [facility] -  [address]    [address] [address]    Phone: [phone] Fax: [phone]    Open 24 Hours?: No      Medication refill instructions:       If your prescription bottle indicates you have medication refills left, it is not necessary to call your provider’s office. Please contact your pharmacy and they will refill your medication.    If your prescription bottle indicates you do not have any refills left, you may request refills at any time through one of the following ways: The online [employer] system (except Urgent Care), by calling your provider’s office, or by asking your pharmacy to contact your provider’s office with a refill request. Medication refills are processed only during regular business hours and may not be available until the next business day. Your provider may request additional information or to have a follow-up visit with you prior to refilling your medication.   *Please Note: Medication refills are assigned a new Rx number when refilled electronically. Your pharmacy may indicate that no refills were authorized even though a new prescription for the same medication is available at the pharmacy. Please request the medicine by name with the pharmacy before contacting your provider for a refill.           [employer]ooze Access Code: Activation code not generated  Current [employer]ooze Status: Active

## 2017-06-14 NOTE — PROCEDURES
Technician: Kim Reyes, RRT/CPFT  Technician Comments:  Fair / good patient effort & cooperation.   ATS/ERS standards for acceptability & reproducibility were not met for Spirometry because of the exhaled time was less than 3.25 seconds on all the VC maneuvers. Post bronchodilator is artifically improved due to longer exhaled time.  Patient consistently would re-inhale after 3 seconds of exhalation.   A bronchodilator of Ventolin HFA- 2puffs via spacer were administered.               .    1.  Spirometry shows normal vital capacity and air flows. The pre-bronchodilator effort was insufficient and therefore not able to accurately compare with the postbronchodilator study.  2.  Lung volumes show mild hyperinflation and mild air trapping  3.  Diffusion capacity is within normal limits  4.  Flow volume loops are within normal limits    Overall Impression: Normal pulmonary function testing

## 2017-06-14 NOTE — MR AVS SNAPSHOT
Alysa Sarabia   2017 12:00 PM   Appointment   MRN: [id number]    Department:  [facility]   Dept Phone:  [phone]    Description:  Female : 1950   Provider:  TY-TRACEY           Allergies as of 2017     Allergen Noted Reactions    Diltiazem 2013   Rash    Skin rash      Vital Signs     Smoking Status                   Never Smoker            Basic Information     Date Of Birth Sex Race Ethnicity Preferred Language    1950 Female  or   Origin (Lao,Somali,Italian,David, etc) English      Your appointments     2017  8:00 AM   Adult Draw/Collection with [facility]   [facility] (--)    [address]  [place] [address]   [phone]            2017  1:00 PM   Established Patient with Darvin Brennan M.D.   [facility] [place]    [address]  [address]  [place] [id number] [phone]           You will be receiving a confirmation call a few days before your appointment from our automated call confirmation system.            Jul 10, 2017 10:00 AM   MR PEREZ WITH/WO with [facility] MRI 1   [facility] IMAGING - MRI - [facility] ([place]    [address]  [place] [id number] [phone]              Problem List              ICD-10-CM Priority Class Noted - Resolved    Colon tumor D49.0 High  3/27/2014 - Present    Essential hypertension I10   9/3/2015 - Present    Ascites R18.8 Medium  2016 - Present    Hydronephrosis of right kidney N13.30 Medium  2016 - Present    Health care maintenance Z00.00   2016 - Present    Uterine leiomyoma D25.9   1/3/2017 - Present    History of small bowel obstruction Z87.19   3/6/2017 - Present      Health Maintenance        Date Due Completion Dates    PAP SMEAR 2016 (N/S)    Override on 2013: (N/S)    MAMMOGRAM 2017, 2015, 2014, 2013, 2012, 12/10/2010, 2009,  11/21/2008, 11/21/2008, 2/8/2008, 2/8/2008, 11/30/2007, 11/30/2007, 3/14/2007, 3/1/2007, 3/1/2007    COLONOSCOPY 12/15/2023 12/15/2013    IMM DTaP/Tdap/Td Vaccine (2 - Td) 7/31/2024 7/31/2014            Current Immunizations     Influenza TIV (IM) 3/27/2014  6:21 PM, 11/2/2012  4:36 PM    Influenza Vaccine Quad Inj (Pf) 12/5/2016  5:33 PM    Pneumococcal polysaccharide vaccine (PPSV-23) 12/15/2016  3:37 AM    SHINGLES VACCINE 11/5/2012  4:25 PM    Tdap Vaccine 7/31/2014      Below and/or attached are the medications your provider expects you to take. Review all of your home medications and newly ordered medications with your provider and/or pharmacist. Follow medication instructions as directed by your provider and/or pharmacist. Please keep your medication list with you and share with your provider. Update the information when medications are discontinued, doses are changed, or new medications (including over-the-counter products) are added; and carry medication information at all times in the event of emergency situations     Allergies:  DILTIAZEM - Rash               Medications  Valid as of: June 14, 2017 -  1:00 PM    Generic Name Brand Name Tablet Size Instructions for use    Aspirin (Tablet Delayed Response) ECOTRIN 325 MG Take 1 Tab by mouth every day.        Calcium   Take 1 Cap by mouth every day.        Cholecalciferol (Tab) cholecalciferol 1000 UNIT Take 1,000 Units by mouth every day.        Lutein   Take  by mouth.        Multiple Vitamin (Tab) THERAGRAN  Take 1 Tab by mouth every day.        Valsartan (Tab) DIOVAN 320 MG TAKE ONE TABLET BY MOUTH EVERY DAY        .                 Medicines prescribed today were sent to:     [facility] [address] - [address] [place] [address]    Phone: [phone] Fax: [phone]    Open 24 Hours?: No      Medication refill instructions:       If your prescription bottle indicates you have medication refills left, it is not  necessary to call your provider’s office. Please contact your pharmacy and they will refill your medication.    If your prescription bottle indicates you do not have any refills left, you may request refills at any time through one of the following ways: The online I and love and you system (except Urgent Care), by calling your provider’s office, or by asking your pharmacy to contact your provider’s office with a refill request. Medication refills are processed only during regular business hours and may not be available until the next business day. Your provider may request additional information or to have a follow-up visit with you prior to refilling your medication.   *Please Note: Medication refills are assigned a new Rx number when refilled electronically. Your pharmacy may indicate that no refills were authorized even though a new prescription for the same medication is available at the pharmacy. Please request the medicine by name with the pharmacy before contacting your provider for a refill.           I and love and you Access Code: Activation code not generated  Current I and love and you Status: Active

## 2017-06-14 NOTE — PROGRESS NOTES
Alysa Sarabia is a 66 y.o. female here for lung nodule.  Patient was referred by Dr. Devlin.    History of Present Illness:    The patient 66-year-old with no significant past pulmonary history. She is originally from [place]. She is a never smoker. She is found to have a couple of spots on a CT scan of the chest. She does have a past medical history for small bowel obstruction and she had a colon resection for a colon tumor that was benign. She works as a . She has no exposures to chemicals or fumes or dusts. She has had no prior CT scans of the chest for comparison. The CT scan of the chest showed a 4 mm nodule in the left lower lobe and a 5 mm nodule in the right upper lobe. Otherwise no other nodules or masses and there was no hilar or mediastinal adenopathy and lung parenchyma was normal. Patient is asymptomatic and has no cough or congestion or shortness of breath or chest pains. She had pulmonary function test done today which showed normal spirometry normal lung volumes and normal diffusion capacity.    Constitutional:  Negative for fever, chills, sweats, and fatigue.  Eyes:  Negative for eye pain and visual changes.  HENT:  Negative for tinnitus and hoarse voice.  Cardiovascular:  Negative for chest pain, leg swelling, syncope and orthopnea.  Respiratory:  See HPI for pertinent negatives  Sleep:  Negative for somnolence, loud snoring, sleep disturbance due to breathing, insomnia.  Gastrointestinal:  Negative for dysphagia, nausea and abdominal pain.  Heme/lymph:  Denies easy bruising, blood clots.  Musculoskeletal:  Negative for arthralgias, sore muscles and back pain.  Skin:  Negative for rash and color change.  Neurological:  Negative for headaches, lightheadedness and weakness.  Psychiatric:  Denies depression.    Current Outpatient Prescriptions   Medication Sig Dispense Refill   • valsartan (DIOVAN) 320 MG tablet TAKE ONE TABLET BY MOUTH EVERY DAY 90 Tab 0   • LUTEIN PO Take   by mouth.     • aspirin EC (ECOTRIN) 325 MG Tablet Delayed Response Take 1 Tab by mouth every day. 10 Tab 0   • CALCIUM PO Take 1 Cap by mouth every day.     • multivitamin (THERAGRAN) Tab Take 1 Tab by mouth every day.     • vitamin D (CHOLECALCIFEROL) 1000 UNIT TABS Take 1,000 Units by mouth every day.       No current facility-administered medications for this visit.       Social History   Substance Use Topics   • Smoking status: Never Smoker    • Smokeless tobacco: Never Used   • Alcohol Use: 4.2 oz/week     7 Standard drinks or equivalent per week      Comment: 4 per week       Past Medical History   Diagnosis Date   • Hypertension    • Hyponatremia    • Small bowel obstruction (CMS-HCC)        Past Surgical History   Procedure Laterality Date   • Lumpectomy     • Breast biopsy  2007   • Colon resection laparoscopic  3/27/2014     Performed by Mac Reddy M.D. at [facility]   • Exploratory laparotomy  5/3/2016     Procedure: EXPLORATORY LAPAROTOMY- Bowel obstruction;  Surgeon: Mac Reddy M.D.;  Location: [facility];  Service:    • Cystoscopy stent placement Right 5/13/2016     Procedure: CYSTOSCOPY STENT PLACEMENT;  Surgeon: Fabrizio Sun M.D.;  Location: [facility];  Service:    • Retrogrades  5/13/2016     Procedure: RETROGRADES;  Surgeon: Fabrizio Sun M.D.;  Location: [facility];  Service:    • Exploratory laparotomy  12/14/2016     Procedure: EXPLORATORY LAPAROTOMY, EXTENSIVE ADHESIOLYSIS, SMALL BOWEL ANASTOMOSIS;  Surgeon: Mac Reddy M.D.;  Location: [facility];  Service:        Allergies:  Diltiazem    Family History   Problem Relation Age of Onset   • Alcohol/Drug Neg Hx    • Allergies Neg Hx    • Diabetes Neg Hx    • Cancer Brother      neck   • Heart Disease Father    • Hyperlipidemia Mother    • Psychiatry Neg Hx    • Stroke Neg Hx    • Thyroid Neg Hx    • Hypertension Mother        Physical Examination    Filed Vitals:  "   06/14/17 1254   Height: 1.575 m (5' 2.01\")   Weight: 45.36 kg (100 lb)   Weight % change since last entry.: 0 %   BP: 122/82   Pulse: 76   BMI (Calculated): 18.29   Resp: 16   Temp: 36.4 °C (97.5 °F)   O2 sat % room air: 96 %       Physical Exam:  Constitutional:  Well developed and well nourished.  Head:  Normocephalic and atraumatic.  Nose:  Nose normal.  Eyes:  Conjunctivae and EOM are normal.  Pupils are equal, round, and reactive to light.  Neck:  Normal range of motion.  Supple.  No JVD. No tracheal deviation, no thyromegally  Lymphadenopathy:  No cervical or supraclavicular adenopathy  Neurological:  Alert and oriented.  Cranial nerves intact.  No focal deficits  Skin:  No rashes or ulcers.  Psyciatric:  Normal mood and affect.    Assessment and Plan:  1. Incidental lung nodule, > 3mm and < 8mm  I reviewed the CT scan of the chest with the patient in detail. She has 2 small subcentimeter nodules measuring 4 and 5 mm. Otherwise the CT scan of the chest is unremarkable. She is a nonsmoker. Per the [employer]ischer Society recommendations I recommend a repeat CT scan of the chest in 12 months. This will be done in February 2018. Otherwise her pulmonary function test is within normal limits and she is asymptomatic.  - CT-CHEST (THORAX) W/O; Future      Recommend follow-up in February 2018    Followup No Follow-up on file.  "

## 2017-07-08 ENCOUNTER — HOSPITAL ENCOUNTER (OUTPATIENT)
Dept: LAB | Facility: MEDICAL CENTER | Age: 67
End: 2017-07-08
Attending: INTERNAL MEDICINE
Payer: COMMERCIAL

## 2017-07-08 DIAGNOSIS — N13.30 HYDRONEPHROSIS OF RIGHT KIDNEY: ICD-10-CM

## 2017-07-08 DIAGNOSIS — D70.9 NEUTROPENIA, UNSPECIFIED TYPE (HCC): ICD-10-CM

## 2017-07-08 LAB
ANION GAP SERPL CALC-SCNC: 8 MMOL/L (ref 0–11.9)
BASOPHILS # BLD AUTO: 1.1 % (ref 0–1.8)
BASOPHILS # BLD: 0.04 K/UL (ref 0–0.12)
BUN SERPL-MCNC: 13 MG/DL (ref 8–22)
CALCIUM SERPL-MCNC: 9 MG/DL (ref 8.5–10.5)
CHLORIDE SERPL-SCNC: 95 MMOL/L (ref 96–112)
CO2 SERPL-SCNC: 26 MMOL/L (ref 20–33)
CREAT SERPL-MCNC: 0.53 MG/DL (ref 0.5–1.4)
EOSINOPHIL # BLD AUTO: 0.05 K/UL (ref 0–0.51)
EOSINOPHIL NFR BLD: 1.4 % (ref 0–6.9)
ERYTHROCYTE [DISTWIDTH] IN BLOOD BY AUTOMATED COUNT: 41.8 FL (ref 35.9–50)
GFR SERPL CREATININE-BSD FRML MDRD: >60 ML/MIN/1.73 M 2
GLUCOSE SERPL-MCNC: 68 MG/DL (ref 65–99)
HCT VFR BLD AUTO: 40.8 % (ref 37–47)
HGB BLD-MCNC: 13.9 G/DL (ref 12–16)
IMM GRANULOCYTES # BLD AUTO: 0.01 K/UL (ref 0–0.11)
IMM GRANULOCYTES NFR BLD AUTO: 0.3 % (ref 0–0.9)
LYMPHOCYTES # BLD AUTO: 1.38 K/UL (ref 1–4.8)
LYMPHOCYTES NFR BLD: 38.2 % (ref 22–41)
MCH RBC QN AUTO: 31.5 PG (ref 27–33)
MCHC RBC AUTO-ENTMCNC: 34.1 G/DL (ref 33.6–35)
MCV RBC AUTO: 92.5 FL (ref 81.4–97.8)
MONOCYTES # BLD AUTO: 0.3 K/UL (ref 0–0.85)
MONOCYTES NFR BLD AUTO: 8.3 % (ref 0–13.4)
NEUTROPHILS # BLD AUTO: 1.83 K/UL (ref 2–7.15)
NEUTROPHILS NFR BLD: 50.7 % (ref 44–72)
NRBC # BLD AUTO: 0 K/UL
NRBC BLD AUTO-RTO: 0 /100 WBC
PLATELET # BLD AUTO: 378 K/UL (ref 164–446)
PMV BLD AUTO: 9.4 FL (ref 9–12.9)
POTASSIUM SERPL-SCNC: 3.9 MMOL/L (ref 3.6–5.5)
RBC # BLD AUTO: 4.41 M/UL (ref 4.2–5.4)
SODIUM SERPL-SCNC: 129 MMOL/L (ref 135–145)
WBC # BLD AUTO: 3.6 K/UL (ref 4.8–10.8)

## 2017-07-08 PROCEDURE — 36415 COLL VENOUS BLD VENIPUNCTURE: CPT

## 2017-07-08 PROCEDURE — 80048 BASIC METABOLIC PNL TOTAL CA: CPT

## 2017-07-08 PROCEDURE — 85025 COMPLETE CBC W/AUTO DIFF WBC: CPT

## 2017-07-10 ENCOUNTER — HOSPITAL ENCOUNTER (OUTPATIENT)
Dept: RADIOLOGY | Facility: MEDICAL CENTER | Age: 67
End: 2017-07-10
Attending: INTERNAL MEDICINE
Payer: COMMERCIAL

## 2017-07-10 DIAGNOSIS — K82.4 CHOLESTEROLOSIS OF GALLBLADDER: ICD-10-CM

## 2017-07-10 DIAGNOSIS — K76.89 OTHER CHRONIC NONALCOHOLIC LIVER DISEASE: ICD-10-CM

## 2017-07-10 PROCEDURE — 74183 MRI ABD W/O CNTR FLWD CNTR: CPT

## 2017-07-10 PROCEDURE — A9579 GAD-BASE MR CONTRAST NOS,1ML: HCPCS | Performed by: INTERNAL MEDICINE

## 2017-07-10 PROCEDURE — 700117 HCHG RX CONTRAST REV CODE 255: Performed by: INTERNAL MEDICINE

## 2017-07-10 RX ADMIN — GADODIAMIDE 10 ML: 287 INJECTION INTRAVENOUS at 10:44

## 2017-07-13 ENCOUNTER — APPOINTMENT (OUTPATIENT)
Dept: MEDICAL GROUP | Facility: MEDICAL CENTER | Age: 67
End: 2017-07-13

## 2017-07-19 RX ORDER — VALSARTAN 320 MG/1
TABLET ORAL
Qty: 90 TAB | Refills: 1 | Status: SHIPPED | OUTPATIENT
Start: 2017-07-19 | End: 2017-10-23 | Stop reason: SDUPTHER

## 2017-07-19 NOTE — TELEPHONE ENCOUNTER
Was the patient seen in the last year in this department? Yes     Does patient have an active prescription for medications requested? Yes     Received Request Via: Pharmacy     Last office visit: 06/14/2017  Last labs: 07/08/2017

## 2017-07-21 ENCOUNTER — TELEPHONE (OUTPATIENT)
Dept: MEDICAL GROUP | Facility: MEDICAL CENTER | Age: 67
End: 2017-07-21

## 2017-07-21 NOTE — TELEPHONE ENCOUNTER
ESTABLISHED PATIENT PRE-VISIT PLANNING     Note: Patient will not be contacted if there is no indication to call.     1.  Reviewed notes from the last few office visits within the medical group: Yes 03/06/2017    2.  If any orders were placed at last visit or intended to be done for this visit (i.e. 6 mos follow-up), do we have Results/Consult Notes?        •  Labs - Labs were not ordered at last office visit.       •  Imaging - Imaging was not ordered at last office visit.       •  Referrals - No referrals were ordered at last office visit.    3. Is this appointment scheduled as a Hospital Follow-Up? No    4.  Immunizations were updated in Epic using WebIZ?: No WebIZ record       •  Web Iz Recommendations: Patient is up to date on all vaccines    5.  Patient is due for the following Health Maintenance Topics:   Health Maintenance Due   Topic Date Due   • PAP SMEAR  02/05/2016   • BONE DENSITY  07/06/2017           6.  Patient was NOT informed to arrive 15 min prior to their scheduled appointment and bring in their medication bottles.

## 2017-07-24 ENCOUNTER — OFFICE VISIT (OUTPATIENT)
Dept: MEDICAL GROUP | Facility: MEDICAL CENTER | Age: 67
End: 2017-07-24
Payer: COMMERCIAL

## 2017-07-24 VITALS
BODY MASS INDEX: 19.51 KG/M2 | HEART RATE: 62 BPM | DIASTOLIC BLOOD PRESSURE: 84 MMHG | SYSTOLIC BLOOD PRESSURE: 120 MMHG | WEIGHT: 106 LBS | RESPIRATION RATE: 16 BRPM | OXYGEN SATURATION: 94 % | HEIGHT: 62 IN | TEMPERATURE: 98.2 F

## 2017-07-24 DIAGNOSIS — E87.1 HYPONATREMIA: ICD-10-CM

## 2017-07-24 DIAGNOSIS — N13.30 HYDRONEPHROSIS OF RIGHT KIDNEY: ICD-10-CM

## 2017-07-24 DIAGNOSIS — K76.89 BENIGN LIVER CYST: ICD-10-CM

## 2017-07-24 DIAGNOSIS — I10 ESSENTIAL HYPERTENSION: ICD-10-CM

## 2017-07-24 DIAGNOSIS — I83.93 VARICOSE VEINS OF LEGS: ICD-10-CM

## 2017-07-24 DIAGNOSIS — Z00.00 HEALTH CARE MAINTENANCE: ICD-10-CM

## 2017-07-24 DIAGNOSIS — D72.819 LEUKOPENIA, UNSPECIFIED TYPE: ICD-10-CM

## 2017-07-24 DIAGNOSIS — Z11.59 NEED FOR HEPATITIS C SCREENING TEST: ICD-10-CM

## 2017-07-24 PROCEDURE — 99214 OFFICE O/P EST MOD 30 MIN: CPT | Performed by: INTERNAL MEDICINE

## 2017-07-24 ASSESSMENT — PATIENT HEALTH QUESTIONNAIRE - PHQ9: CLINICAL INTERPRETATION OF PHQ2 SCORE: 0

## 2017-07-24 NOTE — PROGRESS NOTES
CHIEF COMPLAINT  Chief Complaint   Patient presents with   • Follow-Up   HTN    HPI  Patient is a 67 y.o. female patient who presents today for the following     Hypertension  Meds: valsartan, 320 mg QD, taking as prescribed.    Measuring BP at home: no  No headaches, vision problems, tinnitus.  No chest pain/pressure, palpitations, irregular heart beats, exertional dyspnea, peripheral edema.  Diet: healthy Low salt diet: no  Exercise: yes  BMI: 19, improved    Hyponatremia  The patient has had low Na since 2007, on/off.    She has normal salt intake.    She is not on diuretic / SSRI.    TSH was normal.    There is no identifiable water loss.     Hydronephrosis of the right kidney  Incidental finding on US abdomen on 5/16/2016, improving.    She was evaluated by urology, recommended ultrasound follow-up once in a year   - The last ultrasound was in 8/16.    Denies flank pain, hematuria, change in urinary habits.      Leukopenia  The patient was found to have slight leukopenia/neutropenia, with a decreased neutrophil count.    She has not had frequent infections.     Liver cyst, benign  She has h/o liver cyst, with the last US abdomen on 2/4/17.    Denies RUQ pain.   MRI in 7/17.    - No need to f/u further.    Varicose veins  The patient has gradually worsening varicose veins in lower extremities.   Worse with standing.  Denies leg swelling or pain.   She asked about possible treatment.    Reviewed PMH, PSH, FH, SH, ALL, HCM/IMM, no changes  Reviewed MEDS, no changes    Patient Active Problem List    Diagnosis Date Noted   • Colon tumor 03/27/2014     Priority: High   • Hydronephrosis of right kidney 05/13/2016     Priority: Medium   • Ascites 05/12/2016     Priority: Medium   • Benign liver cyst 07/24/2017   • History of small bowel obstruction 03/06/2017   • Uterine leiomyoma 01/03/2017   • Health care maintenance 07/22/2016   • Essential hypertension 09/03/2015     CURRENT MEDICATIONS  Current Outpatient  Prescriptions   Medication Sig Dispense Refill   • valsartan (DIOVAN) 320 MG tablet TAKE ONE TABLET BY MOUTH ONCE DAILY 90 Tab 1   • LUTEIN PO Take  by mouth.     • aspirin EC (ECOTRIN) 325 MG Tablet Delayed Response Take 1 Tab by mouth every day. 10 Tab 0   • CALCIUM PO Take 1 Cap by mouth every day.     • multivitamin (THERAGRAN) Tab Take 1 Tab by mouth every day.     • vitamin D (CHOLECALCIFEROL) 1000 UNIT TABS Take 1,000 Units by mouth every day.       No current facility-administered medications for this visit.     ALLERGIES  Allergies: Diltiazem    PAST MEDICAL HISTORY  Past Medical History   Diagnosis Date   • Hypertension    • Hyponatremia    • Small bowel obstruction (CMS-HCC)      SURGICAL HISTORY  She  has past surgical history that includes lumpectomy; breast biopsy (2007); colon resection laparoscopic (3/27/2014); exploratory laparotomy (5/3/2016); cystoscopy stent placement (Right, 5/13/2016); retrogrades (5/13/2016); and exploratory laparotomy (12/14/2016).    SOCIAL HISTORY  Social History   Substance Use Topics   • Smoking status: Never Smoker    • Smokeless tobacco: Never Used   • Alcohol Use: 4.2 oz/week     7 Standard drinks or equivalent per week      Comment: 4 per week     Social History     Social History Narrative    Lives with .     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  HENT: Negative for congestion, sore throat.  Eyes: Negative for blurred vision.   Respiratory: Negative for cough, shortness of breath.  Cardiovascular:  "Negative for chest pain, palpitations. And per HPI.  Gastrointestinal: Negative for heartburn, nausea, abdominal pain.   Genitourinary: Negative for dysuria. And per HPI.  Musculoskeletal: Negative for significant myalgias, back pain and joint pain.   Skin: Negative for rash and itching.   Neuro: Negative for dizziness, weakness and headaches.   Endo/Heme/Allergies: Does not bruise/bleed easily. And per HPI.  Psychiatric/Behavioral: Negative for depression, anxiety    PHYSICAL EXAM   Blood pressure 120/84, pulse 62, temperature 36.8 °C (98.2 °F), resp. rate 16, height 1.575 m (5' 2.01\"), weight 48.081 kg (106 lb), SpO2 94 %, not currently breastfeeding. Body mass index is 19.38 kg/(m^2).  General:  NAD, well appearing  HEENT:   NC/AT, PERRLA, EOMI, TMs are clear. Oropharyngeal mucosa is pink,  without lesions;  no cervical / supraclavicular  lymphadenopathy, no thyromegaly.    Cardiovascular: RRR.   No m/r/g. No carotid bruits .      Lungs:   CTAB, no w/r/r, no respiratory distress.  Abdomen: Soft, NT/ND + BS; no suprapubic tenderness; no masses or hepatosplenomegaly.  Extremities:  2+ DP and radial pulses bilaterally.  No c/c/e.   B/L varicose veins, LE.  Skin:  Warm, dry.  No erythema. No rash.   Neurologic: Alert & oriented x 3.  No focal deficits.  Psychiatric:  Affect normal, mood normal, judgment normal.    LABS     Labs are reviewed and discussed with a patient  Lab Results   Component Value Date/Time    CHOLESTEROL, 10/08/2016 08:07 AM    LDL 96 10/08/2016 08:07 AM    HDL 73 10/08/2016 08:07 AM    TRIGLYCERIDES 87 10/08/2016 08:07 AM       Lab Results   Component Value Date/Time    SODIUM 129* 07/08/2017 07:56 AM    POTASSIUM 3.9 07/08/2017 07:56 AM    CHLORIDE 95* 07/08/2017 07:56 AM    CO2 26 07/08/2017 07:56 AM    GLUCOSE 68 07/08/2017 07:56 AM    BUN 13 07/08/2017 07:56 AM    CREATININE 0.53 07/08/2017 07:56 AM     Lab Results   Component Value Date/Time    ALKALINE PHOSPHATASE 56 01/11/2017 07:43 " AM    AST(SGOT) 14 01/11/2017 07:43 AM    ALT(SGPT) 11 01/11/2017 07:43 AM    TOTAL BILIRUBIN 0.9 01/11/2017 07:43 AM      Lab Results   Component Value Date/Time    WBC 3.6* 07/08/2017 07:56 AM    RBC 4.41 07/08/2017 07:56 AM    HEMOGLOBIN 13.9 07/08/2017 07:56 AM    HEMATOCRIT 40.8 07/08/2017 07:56 AM    MCV 92.5 07/08/2017 07:56 AM    MCH 31.5 07/08/2017 07:56 AM    MCHC 34.1 07/08/2017 07:56 AM    MPV 9.4 07/08/2017 07:56 AM    NEUTROPHILS-POLYS 50.70 07/08/2017 07:56 AM    LYMPHOCYTES 38.20 07/08/2017 07:56 AM    MONOCYTES 8.30 07/08/2017 07:56 AM    EOSINOPHILS 1.40 07/08/2017 07:56 AM    BASOPHILS 1.10 07/08/2017 07:56 AM    ANISOCYTOSIS 1+ 05/05/2016 04:10 AM      IMAGING     Reviewed the following imaging:    MRI abdomen, 7/10/17  1.  Multiple simple liver cysts measuring up to 12 mm.  2.  No enhancing hepatic mass identified.  3.  Dilated RIGHT renal pelvis again noted, without evidence to indicate significant ureteral obstruction.    Ultrasound abdomen, 8/1/16  The right kidney measures 12.27 cm.  The left kidney measures 10.54 cm.  There is moderate right hydronephrosis  There are no abnormal calcifications.  The bladder demonstrates no focal wall abnormality.  Estimated PVR is 42.6 mL.    ASSESMENT AND PLAN        1. Essential hypertension  Controlled, continue current treatment  - COMP METABOLIC PANEL; Future    2. Hyponatremia  Mild, intermittent, f/u CBC  - COMP METABOLIC PANEL; Future    3. Hydronephrosis of right kidney  Mild, improved, follow-up ultrasound every year, the next would be in April 2017    4. Leukopenia, unspecified type  Mild, follow-up CBC  - CBC WITH DIFFERENTIAL; Future    5. Benign liver cyst  Benign, no follow-up is needed    6. Varicose veins of legs  Advised to raise legs once in a day for 20 minutes;  - Compression stockings may help  - REFERRAL TO VASCULAR SURGERY    7. Health care maintenance  Pending Pap smear by GYN    8. Need for hepatitis C screening test  - HEP C VIRUS  ANTIBODY; Future    Counseling:   - Smoking:  Nonsmoker    Followup: Return in about 3 months (around 10/24/2017) for Short.    All questions are answered.    Please note that this dictation was created using voice recognition software, and that there might be errors of ashok and possibly content.

## 2017-07-24 NOTE — MR AVS SNAPSHOT
"        Alysa Sarabia   2017 8:20 AM   Office Visit   MRN: [id number]    Department:  [facility]   Dept Phone:  [phone]    Description:  Female : 1950   Provider:  Darvni Brennan M.D.           Reason for Visit     Follow-Up           Allergies as of 2017     Allergen Noted Reactions    Diltiazem 2013   Rash    Skin rash      You were diagnosed with     Essential hypertension   [3982079]       Hyponatremia   [917706]       Hydronephrosis of right kidney   [489708]       Gallbladder polyp   [219774]       Leukopenia, unspecified type   [5758186]       Benign liver cyst   [325202]       Health care maintenance   [854414]       Need for hepatitis C screening test   [486731]       Varicose veins of legs   [152432]         Vital Signs     Blood Pressure Pulse Temperature Respirations Height Weight    120/84 mmHg 62 36.8 °C (98.2 °F) 16 1.575 m (5' 2.01\") 48.081 kg (106 lb)    Body Mass Index Oxygen Saturation Breastfeeding? Smoking Status          19.38 kg/m2 94% No Never Smoker         Basic Information     Date Of Birth Sex Race Ethnicity Preferred Language    1950 Female  or   Origin (Danish,Nepalese,[place], etc) English      Your appointments     Sep 01, 2017  3:40 PM   MA [id number] with LORETA GARCIA MG 1   [facility] ([place]    [address]  [place] [address] [phone]           No deodorant, powder, perfume or lotion under the arm or breast area.            2017  2:40 PM   ANNUAL EXAM PREVENTATIVE with Darvin Brennan M.D.   [facility] ([place]    [address]  [address]  [place] [address] [phone]              Problem List              ICD-10-CM Priority Class Noted - Resolved    Colon tumor D49.0 High  3/27/2014 - Present    Essential hypertension I10   9/3/2015 - Present    Ascites R18.8 Medium  " 5/12/2016 - Present    Hydronephrosis of right kidney N13.30 Medium  5/13/2016 - Present    Health care maintenance Z00.00   7/22/2016 - Present    Uterine leiomyoma D25.9   1/3/2017 - Present    History of small bowel obstruction Z87.19   3/6/2017 - Present    Benign liver cyst K76.89   7/24/2017 - Present      Health Maintenance        Date Due Completion Dates    PAP SMEAR 2/5/2016 2/5/2013 (N/S)    Override on 2/5/2013: (N/S)    BONE DENSITY 7/6/2017 ---    MAMMOGRAM 8/1/2017 8/1/2016, 5/6/2015, 5/5/2014, 4/11/2013, 4/5/2012, 12/10/2010, 11/20/2009, 11/21/2008, 11/21/2008, 2/8/2008, 2/8/2008, 11/30/2007, 11/30/2007, 3/14/2007, 3/1/2007, 3/1/2007    IMM INFLUENZA (1) 9/1/2017 12/5/2016, 3/27/2014, 11/2/2012    IMM PNEUMOCOCCAL 65+ (ADULT) LOW/MEDIUM RISK SERIES (1 of 2 - PCV13) 12/15/2017 12/15/2016    COLONOSCOPY 12/15/2023 12/15/2013    IMM DTaP/Tdap/Td Vaccine (2 - Td) 7/31/2024 7/31/2014            Current Immunizations     Influenza TIV (IM) 3/27/2014  6:21 PM, 11/2/2012  4:36 PM    Influenza Vaccine Quad Inj (Pf) 12/5/2016  5:33 PM    Pneumococcal polysaccharide vaccine (PPSV-23) 12/15/2016  3:37 AM    SHINGLES VACCINE 11/5/2012  4:25 PM    Tdap Vaccine 7/31/2014      Below and/or attached are the medications your provider expects you to take. Review all of your home medications and newly ordered medications with your provider and/or pharmacist. Follow medication instructions as directed by your provider and/or pharmacist. Please keep your medication list with you and share with your provider. Update the information when medications are discontinued, doses are changed, or new medications (including over-the-counter products) are added; and carry medication information at all times in the event of emergency situations     Allergies:  DILTIAZEM - Rash               Medications  Valid as of: July 24, 2017 -  8:42 AM    Generic Name Brand Name Tablet Size Instructions for use    Aspirin (Tablet Delayed  Response) ECOTRIN 325 MG Take 1 Tab by mouth every day.        Calcium   Take 1 Cap by mouth every day.        Cholecalciferol (Tab) cholecalciferol 1000 UNIT Take 1,000 Units by mouth every day.        Lutein   Take  by mouth.        Multiple Vitamin (Tab) THERAGRAN  Take 1 Tab by mouth every day.        Valsartan (Tab) DIOVAN 320 MG TAKE ONE TABLET BY MOUTH ONCE DAILY        .                 Medicines prescribed today were sent to:     [facility] [address] [place] [address] - [id number] [facility] [place] [address]    Phone: [phone] Fax: [phone]    Open 24 Hours?: No      Medication refill instructions:       If your prescription bottle indicates you have medication refills left, it is not necessary to call your provider’s office. Please contact your pharmacy and they will refill your medication.    If your prescription bottle indicates you do not have any refills left, you may request refills at any time through one of the following ways: The online [facility] system (except Urgent Care), by calling your provider’s office, or by asking your pharmacy to contact your provider’s office with a refill request. Medication refills are processed only during regular business hours and may not be available until the next business day. Your provider may request additional information or to have a follow-up visit with you prior to refilling your medication.   *Please Note: Medication refills are assigned a new Rx number when refilled electronically. Your pharmacy may indicate that no refills were authorized even though a new prescription for the same medication is available at the pharmacy. Please request the medicine by name with the pharmacy before contacting your provider for a refill.        Your To Do List     Future Labs/Procedures Complete By Expires    CBC WITH DIFFERENTIAL  As directed 7/25/2018    COMP METABOLIC PANEL  As directed 7/25/2018    HEP C VIRUS ANTIBODY  As directed 7/25/2018         Referral     A referral request has been sent to our patient care coordination department. Please allow 3-5 business days for us to process this request and contact you either by phone or mail. If you do not hear from us by the 5th business day, please call us at [phone].           [employer] Access Code: Activation code not generated  Current [employer] Status: Active

## 2017-09-21 ENCOUNTER — HOSPITAL ENCOUNTER (OUTPATIENT)
Dept: RADIOLOGY | Facility: MEDICAL CENTER | Age: 67
End: 2017-09-21
Attending: OBSTETRICS & GYNECOLOGY
Payer: COMMERCIAL

## 2017-09-21 DIAGNOSIS — Z12.31 ENCOUNTER FOR SCREENING MAMMOGRAM FOR MALIGNANT NEOPLASM OF BREAST: ICD-10-CM

## 2017-09-21 PROCEDURE — G0202 SCR MAMMO BI INCL CAD: HCPCS

## 2017-09-26 ENCOUNTER — HOSPITAL ENCOUNTER (OUTPATIENT)
Facility: MEDICAL CENTER | Age: 67
End: 2017-09-26
Payer: COMMERCIAL

## 2017-09-27 LAB
ALBUMIN SERPL BCP-MCNC: 4.4 G/DL (ref 3.2–4.9)
ALBUMIN/GLOB SERPL: 1.4 G/DL
ALP SERPL-CCNC: 77 U/L (ref 30–99)
ALT SERPL-CCNC: 17 U/L (ref 2–50)
ANION GAP SERPL CALC-SCNC: 7 MMOL/L (ref 0–11.9)
AST SERPL-CCNC: 24 U/L (ref 12–45)
BDY FAT % MEASURED: 32.3 %
BILIRUB SERPL-MCNC: 1.3 MG/DL (ref 0.1–1.5)
BP DIAS: 70 MMHG
BP SYS: 120 MMHG
BUN SERPL-MCNC: 12 MG/DL (ref 8–22)
CALCIUM SERPL-MCNC: 9.6 MG/DL (ref 8.5–10.5)
CHLORIDE SERPL-SCNC: 97 MMOL/L (ref 96–112)
CHOLEST SERPL-MCNC: 197 MG/DL (ref 100–199)
CO2 SERPL-SCNC: 27 MMOL/L (ref 20–33)
CREAT SERPL-MCNC: 0.54 MG/DL (ref 0.5–1.4)
DIABETES HTDIA: NO
EVENT NAME HTEVT: NORMAL
GFR SERPL CREATININE-BSD FRML MDRD: >60 ML/MIN/1.73 M 2
GLOBULIN SER CALC-MCNC: 3.2 G/DL (ref 1.9–3.5)
GLUCOSE SERPL-MCNC: 89 MG/DL (ref 65–99)
HDLC SERPL-MCNC: 79 MG/DL
HYPERTENSION HTHYP: YES
LDLC SERPL CALC-MCNC: 105 MG/DL
POTASSIUM SERPL-SCNC: 4.6 MMOL/L (ref 3.6–5.5)
PROT SERPL-MCNC: 7.6 G/DL (ref 6–8.2)
SCREENING LOC CITY HTCIT: NORMAL
SCREENING LOC STATE HTSTA: NORMAL
SCREENING LOCATION HTLOC: NORMAL
SODIUM SERPL-SCNC: 131 MMOL/L (ref 135–145)
SUBSCRIBER ID HTSID: NORMAL
TRIGL SERPL-MCNC: 64 MG/DL (ref 0–149)

## 2017-10-12 ENCOUNTER — HOSPITAL ENCOUNTER (OUTPATIENT)
Dept: RADIOLOGY | Facility: MEDICAL CENTER | Age: 67
End: 2017-10-12
Attending: INTERNAL MEDICINE
Payer: COMMERCIAL

## 2017-10-12 PROCEDURE — 76775 US EXAM ABDO BACK WALL LIM: CPT

## 2017-10-21 ENCOUNTER — HOSPITAL ENCOUNTER (OUTPATIENT)
Dept: LAB | Facility: MEDICAL CENTER | Age: 67
End: 2017-10-21
Attending: INTERNAL MEDICINE
Payer: COMMERCIAL

## 2017-10-21 DIAGNOSIS — Z11.59 NEED FOR HEPATITIS C SCREENING TEST: ICD-10-CM

## 2017-10-21 DIAGNOSIS — D72.819 LEUKOPENIA, UNSPECIFIED TYPE: ICD-10-CM

## 2017-10-21 DIAGNOSIS — I10 ESSENTIAL HYPERTENSION: ICD-10-CM

## 2017-10-21 DIAGNOSIS — E87.1 HYPONATREMIA: ICD-10-CM

## 2017-10-21 LAB
ALBUMIN SERPL BCP-MCNC: 4 G/DL (ref 3.2–4.9)
ALBUMIN/GLOB SERPL: 1.4 G/DL
ALP SERPL-CCNC: 74 U/L (ref 30–99)
ALT SERPL-CCNC: 14 U/L (ref 2–50)
ANION GAP SERPL CALC-SCNC: 6 MMOL/L (ref 0–11.9)
AST SERPL-CCNC: 23 U/L (ref 12–45)
BASOPHILS # BLD AUTO: 2 % (ref 0–1.8)
BASOPHILS # BLD: 0.05 K/UL (ref 0–0.12)
BILIRUB SERPL-MCNC: 0.8 MG/DL (ref 0.1–1.5)
BUN SERPL-MCNC: 11 MG/DL (ref 8–22)
CALCIUM SERPL-MCNC: 9.3 MG/DL (ref 8.5–10.5)
CHLORIDE SERPL-SCNC: 100 MMOL/L (ref 96–112)
CO2 SERPL-SCNC: 27 MMOL/L (ref 20–33)
CREAT SERPL-MCNC: 0.51 MG/DL (ref 0.5–1.4)
EOSINOPHIL # BLD AUTO: 0.03 K/UL (ref 0–0.51)
EOSINOPHIL NFR BLD: 1.2 % (ref 0–6.9)
ERYTHROCYTE [DISTWIDTH] IN BLOOD BY AUTOMATED COUNT: 44.5 FL (ref 35.9–50)
GFR SERPL CREATININE-BSD FRML MDRD: >60 ML/MIN/1.73 M 2
GLOBULIN SER CALC-MCNC: 2.9 G/DL (ref 1.9–3.5)
GLUCOSE SERPL-MCNC: 82 MG/DL (ref 65–99)
HCT VFR BLD AUTO: 40.4 % (ref 37–47)
HCV AB SER QL: NEGATIVE
HGB BLD-MCNC: 13.4 G/DL (ref 12–16)
IMM GRANULOCYTES # BLD AUTO: 0.01 K/UL (ref 0–0.11)
IMM GRANULOCYTES NFR BLD AUTO: 0.4 % (ref 0–0.9)
LYMPHOCYTES # BLD AUTO: 0.88 K/UL (ref 1–4.8)
LYMPHOCYTES NFR BLD: 35.1 % (ref 22–41)
MCH RBC QN AUTO: 30.9 PG (ref 27–33)
MCHC RBC AUTO-ENTMCNC: 33.2 G/DL (ref 33.6–35)
MCV RBC AUTO: 93.3 FL (ref 81.4–97.8)
MONOCYTES # BLD AUTO: 0.21 K/UL (ref 0–0.85)
MONOCYTES NFR BLD AUTO: 8.4 % (ref 0–13.4)
NEUTROPHILS # BLD AUTO: 1.33 K/UL (ref 2–7.15)
NEUTROPHILS NFR BLD: 52.9 % (ref 44–72)
NRBC # BLD AUTO: 0 K/UL
NRBC BLD AUTO-RTO: 0 /100 WBC
PLATELET # BLD AUTO: 408 K/UL (ref 164–446)
PMV BLD AUTO: 9.2 FL (ref 9–12.9)
POTASSIUM SERPL-SCNC: 4.2 MMOL/L (ref 3.6–5.5)
PROT SERPL-MCNC: 6.9 G/DL (ref 6–8.2)
RBC # BLD AUTO: 4.33 M/UL (ref 4.2–5.4)
SODIUM SERPL-SCNC: 133 MMOL/L (ref 135–145)
WBC # BLD AUTO: 2.5 K/UL (ref 4.8–10.8)

## 2017-10-21 PROCEDURE — 85025 COMPLETE CBC W/AUTO DIFF WBC: CPT

## 2017-10-21 PROCEDURE — 36415 COLL VENOUS BLD VENIPUNCTURE: CPT

## 2017-10-21 PROCEDURE — 80053 COMPREHEN METABOLIC PANEL: CPT

## 2017-10-21 PROCEDURE — 86803 HEPATITIS C AB TEST: CPT

## 2017-10-23 ENCOUNTER — OFFICE VISIT (OUTPATIENT)
Dept: MEDICAL GROUP | Facility: MEDICAL CENTER | Age: 67
End: 2017-10-23
Payer: COMMERCIAL

## 2017-10-23 VITALS
WEIGHT: 112 LBS | SYSTOLIC BLOOD PRESSURE: 90 MMHG | TEMPERATURE: 98.4 F | DIASTOLIC BLOOD PRESSURE: 80 MMHG | HEIGHT: 62 IN | OXYGEN SATURATION: 97 % | BODY MASS INDEX: 20.61 KG/M2 | HEART RATE: 89 BPM

## 2017-10-23 DIAGNOSIS — I10 ESSENTIAL HYPERTENSION: ICD-10-CM

## 2017-10-23 DIAGNOSIS — Z78.0 ASYMPTOMATIC POSTMENOPAUSAL STATE: ICD-10-CM

## 2017-10-23 DIAGNOSIS — D72.819 LEUKOPENIA, UNSPECIFIED TYPE: ICD-10-CM

## 2017-10-23 DIAGNOSIS — Z00.00 HEALTH CARE MAINTENANCE: ICD-10-CM

## 2017-10-23 DIAGNOSIS — N13.30 HYDRONEPHROSIS OF RIGHT KIDNEY: ICD-10-CM

## 2017-10-23 DIAGNOSIS — Z00.00 ANNUAL PHYSICAL EXAM: ICD-10-CM

## 2017-10-23 PROCEDURE — 99397 PER PM REEVAL EST PAT 65+ YR: CPT | Performed by: INTERNAL MEDICINE

## 2017-10-23 RX ORDER — VALSARTAN 320 MG/1
320 TABLET ORAL DAILY
Qty: 90 TAB | Refills: 3 | Status: SHIPPED | OUTPATIENT
Start: 2017-10-23 | End: 2018-07-16

## 2017-10-23 NOTE — PROGRESS NOTES
CHIEF COMPLIANT:    Alysa Sarabia is a 67 y.o. female who presents for annual exam    Pt has GYN provider: no    Recommendations:  Regular exercise at least 4 days a week  Diet: advised balanced diet  Dental exam at least 1-2 times per year  Sunscreen use: advised    Immunizations:  TdaP:  2014  Pneumonia vaccine: 2016, PPV 23  Shingless vaccine:     Colonoscopy: 2012  Bone density test: pending    GYN     Postmenopausal.    Hypertension  Meds: valsartan, 320 mg QD, taking as prescribed.    Measuring BP at home: no  No headaches, vision problems, tinnitus.  No chest pain/pressure, palpitations, irregular heart beats, exertional dyspnea, peripheral edema.  Diet: healthy Low salt diet: no  Exercise: yes  BMI: 20     Hydronephrosis of the right kidney  Incidental finding on US abdomen on 5/16/2016, stable  She was evaluated by urology, recommended ultrasound follow-up once in a year  Denies flank pain, hematuria, change in urinary habits.       Leukopenia  The patient was found to have slight leukopenia/neutropenia, with a decreased neutrophil count.    She has not had frequent infections.       CURRENT MEDICATIONS  Current Outpatient Prescriptions   Medication Sig Dispense Refill   • valsartan (DIOVAN) 320 MG tablet Take 1 Tab by mouth every day. 90 Tab 3   • LUTEIN PO Take  by mouth.     • aspirin EC (ECOTRIN) 325 MG Tablet Delayed Response Take 1 Tab by mouth every day. 10 Tab 0   • CALCIUM PO Take 1 Cap by mouth every day.     • multivitamin (THERAGRAN) Tab Take 1 Tab by mouth every day.     • vitamin D (CHOLECALCIFEROL) 1000 UNIT TABS Take 1,000 Units by mouth every day.       No current facility-administered medications for this visit.        ALLERGIES  Allergies: Diltiazem    PAST MEDICAL HISTORY  She  has a past medical history of Hydronephrosis; Hypertension; Hyponatremia; Leukopenia; Liver cyst; Small bowel obstruction; and Varicose vein of leg.    SURGICAL HISTORY  She  has a past surgical history  that includes lumpectomy; breast biopsy (2007); colon resection laparoscopic (3/27/2014); exploratory laparotomy (5/3/2016); cystoscopy stent placement (Right, 5/13/2016); retrogrades (5/13/2016); and exploratory laparotomy (12/14/2016).    SOCIAL HISTORY  Social History   Substance Use Topics   • Smoking status: Never Smoker   • Smokeless tobacco: Never Used   • Alcohol use 4.2 oz/week     7 Standard drinks or equivalent per week      Comment: 4 per week     Social History     Social History Narrative    Lives with .     Children: 1    Work: teacher in [employer]       FAMILY HISTORY  Family History   Problem Relation Age of Onset   • Alcohol/Drug Neg Hx    • Allergies Neg Hx    • Diabetes Neg Hx    • Cancer Brother      neck   • Heart Disease Father    • Hyperlipidemia Mother    • Psychiatry Neg Hx    • Stroke Neg Hx    • Thyroid Neg Hx    • Hypertension Mother      Family Status   Relation Status   • Mother Alive   • Father Alive     REVIEW OF SYSTEMS  Constitutional: Denies fever, chills, or sweats  Skin: negative for rash, scaling, itching, pigmentation, hair or nail changes.  Eyes: negative for visual blurring, double vision, eye pain, floaters and discharge from eyes  ENT: negative for tinnitus, vertigo, bleeding gums, dental problem and hoarseness, frequent URI's, sinus trouble, persistent sore throat  Respiratory: negative for persistent cough, hemoptysis, dyspnea, recurrent pneumonia or bronchitis, asthma and wheezing  Cardiovascular: negative for palpitations, tachycardia, irregular heart beat, chest pain, or peripheral edema.  Breast: Denies breast tenderness, mass, discharge, changes in size or contour, or abnormal cyclic discomfort.  Gastrointestinal: negative for poor appetite, dysphagia, nausea, heartburn or reflux, abdominal pain, hemorrhoids, constipation or diarrhea  Genitourinary: negative for dysuria, frequency, hesitancy, incontinence, sexually transmitted  "disease, abnormal vaginal discharge/bleeding, dysparunia   Musculoskeletal: negative for joint swelling and muscle pain/ soreness  Neuro: negative for migraine headaches, involuntary movements or tremor  Psychiatric: negative for excessive alcohol consumption or illegal drug use, sleep problems, anxiety, depression, sexual difficulties  Hematologic/Lymphatic/Immunologic: negative for anemia, unusual bruising, swollen glands, HIV risk factors  Endocrine: negative for temperature intolerance, polydipsia, polyuria.     PHYSICAL EXAMINATION:  Blood pressure (!) 90/80, pulse 89, temperature 36.9 °C (98.4 °F), height 1.575 m (5' 2\"), weight 50.8 kg (112 lb), SpO2 97 %, not currently breastfeeding.  Body mass index is 20.49 kg/m².  Wt Readings from Last 4 Encounters:   10/23/17 50.8 kg (112 lb)   07/24/17 48.1 kg (106 lb)   06/14/17 45.4 kg (100 lb)   03/06/17 45.7 kg (100 lb 12 oz)     General appearance:healthy, well developed, well nourished, well-groomed  Psych: alert, no distress, cooperative. Eye contact good, speech goal directed. Affect calm.   Eyes: conjunctivae and sclerae normal, lids and lashes normal, EOMI, PERRLA  ENT: Ears: external ears normal to inspection, canals clear. TMs pearly gray with normal light reflex and anatomic landmarks, Nose/Sinuses: nose shows no deformity, asymmetry, or inflammation, nasal mucosa normal, no sinus tenderness,   Oropharynx: lips normal without lesions, buccal mucosa normal, gums healthy, teeth intact, non-carious, palate normal, tongue midline and normal  Neck: no asymmetry, masses, adenopathy. Thyroid normal to palpation, carotids without bruits, no jugular venous distention  Lungs: clear to auscultation with good excursion, Normal respiratory rate. Chest symmetric no chest wall tenderness.  Cardiovascular: Regular rate and rhythm, no murmur.  No peripheral edema  Abdomen:  Soft, non-tender, no masses, HSM or palpable renal abnormalities. Bowel sounds normal, no bruits " heard. No CVAT.  Musculoskeletal: no evidence of joint effusion, ROM of all joints is normal, no crepitation detected, strength grossly normal UE and LE, proximal and distal motor groups. No deformities present  Lymphatic: None significantly enlarged supraclavicular, axillary, or inguinal  Skin: color normal, vascularity normal, no rashes or suspicious lesions, no evidence of bleeding or bruising  Neuro: speech normal, mental status intact, gait grossly normal, muscle tone normal.    LABS  Reviewed and discussed:  Lab Results   Component Value Date/Time    CHOLSTRLTOT 197 09/26/2017 07:39 AM     (H) 09/26/2017 07:39 AM    HDL 79 09/26/2017 07:39 AM    TRIGLYCERIDE 64 09/26/2017 07:39 AM       Lab Results   Component Value Date/Time    SODIUM 133 (L) 10/21/2017 07:22 AM    POTASSIUM 4.2 10/21/2017 07:22 AM    CHLORIDE 100 10/21/2017 07:22 AM    CO2 27 10/21/2017 07:22 AM    GLUCOSE 82 10/21/2017 07:22 AM    BUN 11 10/21/2017 07:22 AM    CREATININE 0.51 10/21/2017 07:22 AM    CREATININE 0.6 05/02/2007 09:18 AM     Lab Results   Component Value Date/Time    ALKPHOSPHAT 74 10/21/2017 07:22 AM    ASTSGOT 23 10/21/2017 07:22 AM    ALTSGPT 14 10/21/2017 07:22 AM    TBILIRUBIN 0.8 10/21/2017 07:22 AM      No results found for: HBA1C  No results found for: TSH  No results found for: FREET4  Lab Results   Component Value Date/Time    WBC 2.5 (LL) 10/21/2017 07:22 AM    RBC 4.33 10/21/2017 07:22 AM    HEMOGLOBIN 13.4 10/21/2017 07:22 AM    HEMATOCRIT 40.4 10/21/2017 07:22 AM    MCV 93.3 10/21/2017 07:22 AM    MCH 30.9 10/21/2017 07:22 AM    MCHC 33.2 (L) 10/21/2017 07:22 AM    MPV 9.2 10/21/2017 07:22 AM    NEUTSPOLYS 52.90 10/21/2017 07:22 AM    LYMPHOCYTES 35.10 10/21/2017 07:22 AM    MONOCYTES 8.40 10/21/2017 07:22 AM    EOSINOPHILS 1.20 10/21/2017 07:22 AM    BASOPHILS 2.00 (H) 10/21/2017 07:22 AM    ANISOCYTOSIS 1+ 05/05/2016 04:10 AM      ASSESSMENT/PLAN    1. Annual physical exam  Reviewed PMH, PSH, FH, SH, ALL,  MEDS, HCM/IMM.   Advised healthy habits, diet, exercise.    2. Health care maintenance  UTD    3. Asymptomatic postmenopausal state  - DS-BONE DENSITY STUDY (DEXA); Future    4. Leukopenia, unspecified type  Stable, follow-up labs  - LDH; Future  - WESTERGREN SED RATE; Future  - CBC WITH DIFFERENTIAL; Future    5. Essential hypertension  Controlled, continue current treatment and monitoring blood pressure at home  - valsartan (DIOVAN) 320 MG tablet; Take 1 Tab by mouth every day.  Dispense: 90 Tab; Refill: 3    6. Hydronephrosis of right kidney  Slight, stable, probably from previous surgeries.    Smoking Counseling: Nonsmoker    Next office visit is due in 6 months    All questions are answered.     Please note that this dictation was created using voice recognition software. Despite all efforts, some minor grammar mistakes are possible.

## 2017-11-18 ENCOUNTER — HOSPITAL ENCOUNTER (OUTPATIENT)
Dept: RADIOLOGY | Facility: MEDICAL CENTER | Age: 67
End: 2017-11-18
Attending: INTERNAL MEDICINE
Payer: COMMERCIAL

## 2017-11-18 DIAGNOSIS — I10 ESSENTIAL HYPERTENSION: ICD-10-CM

## 2017-11-18 DIAGNOSIS — K82.4 GALLBLADDER POLYP: ICD-10-CM

## 2017-11-18 DIAGNOSIS — K76.89 HEPATIC CYST: ICD-10-CM

## 2017-11-18 PROCEDURE — 76705 ECHO EXAM OF ABDOMEN: CPT

## 2018-02-28 ENCOUNTER — APPOINTMENT (OUTPATIENT)
Dept: RADIOLOGY | Facility: MEDICAL CENTER | Age: 68
End: 2018-02-28
Attending: EMERGENCY MEDICINE
Payer: COMMERCIAL

## 2018-02-28 ENCOUNTER — HOSPITAL ENCOUNTER (EMERGENCY)
Facility: MEDICAL CENTER | Age: 68
End: 2018-02-28
Attending: EMERGENCY MEDICINE
Payer: COMMERCIAL

## 2018-02-28 VITALS
RESPIRATION RATE: 21 BRPM | DIASTOLIC BLOOD PRESSURE: 88 MMHG | TEMPERATURE: 98.7 F | OXYGEN SATURATION: 98 % | HEART RATE: 79 BPM | SYSTOLIC BLOOD PRESSURE: 128 MMHG | WEIGHT: 110.12 LBS | HEIGHT: 62 IN | BODY MASS INDEX: 20.26 KG/M2

## 2018-02-28 DIAGNOSIS — S43.005A DISLOCATION OF LEFT SHOULDER JOINT, INITIAL ENCOUNTER: ICD-10-CM

## 2018-02-28 PROCEDURE — 99285 EMERGENCY DEPT VISIT HI MDM: CPT

## 2018-02-28 PROCEDURE — 73030 X-RAY EXAM OF SHOULDER: CPT | Mod: LT

## 2018-02-28 PROCEDURE — 99152 MOD SED SAME PHYS/QHP 5/>YRS: CPT

## 2018-02-28 PROCEDURE — 23650 CLTX SHO DSLC W/MNPJ WO ANES: CPT

## 2018-02-28 PROCEDURE — 96376 TX/PRO/DX INJ SAME DRUG ADON: CPT

## 2018-02-28 PROCEDURE — 700111 HCHG RX REV CODE 636 W/ 250 OVERRIDE (IP)

## 2018-02-28 PROCEDURE — 96375 TX/PRO/DX INJ NEW DRUG ADDON: CPT

## 2018-02-28 PROCEDURE — 700111 HCHG RX REV CODE 636 W/ 250 OVERRIDE (IP): Performed by: EMERGENCY MEDICINE

## 2018-02-28 PROCEDURE — 96374 THER/PROPH/DIAG INJ IV PUSH: CPT

## 2018-02-28 RX ORDER — MORPHINE SULFATE 4 MG/ML
4 INJECTION, SOLUTION INTRAMUSCULAR; INTRAVENOUS ONCE
Status: COMPLETED | OUTPATIENT
Start: 2018-02-28 | End: 2018-02-28

## 2018-02-28 RX ORDER — KETOROLAC TROMETHAMINE 30 MG/ML
30 INJECTION, SOLUTION INTRAMUSCULAR; INTRAVENOUS ONCE
Status: COMPLETED | OUTPATIENT
Start: 2018-02-28 | End: 2018-02-28

## 2018-02-28 RX ORDER — ONDANSETRON 2 MG/ML
4 INJECTION INTRAMUSCULAR; INTRAVENOUS ONCE
Status: COMPLETED | OUTPATIENT
Start: 2018-02-28 | End: 2018-02-28

## 2018-02-28 RX ADMIN — KETOROLAC TROMETHAMINE 30 MG: 30 INJECTION, SOLUTION INTRAMUSCULAR at 15:45

## 2018-02-28 RX ADMIN — KETOROLAC TROMETHAMINE 30 MG: 30 INJECTION, SOLUTION INTRAMUSCULAR at 15:04

## 2018-02-28 RX ADMIN — MORPHINE SULFATE 4 MG: 4 INJECTION INTRAVENOUS at 15:02

## 2018-02-28 RX ADMIN — PROPOFOL 40 MG: 10 INJECTION, EMULSION INTRAVENOUS at 17:09

## 2018-02-28 RX ADMIN — MORPHINE SULFATE 4 MG: 4 INJECTION INTRAVENOUS at 15:45

## 2018-02-28 RX ADMIN — ONDANSETRON 4 MG: 2 INJECTION INTRAMUSCULAR; INTRAVENOUS at 15:45

## 2018-02-28 RX ADMIN — ONDANSETRON 4 MG: 2 INJECTION INTRAMUSCULAR; INTRAVENOUS at 15:02

## 2018-02-28 ASSESSMENT — PAIN SCALES - GENERAL: PAINLEVEL_OUTOF10: 10

## 2018-02-28 NOTE — ED NOTES
"Chief Complaint   Patient presents with   • Shoulder Injury     BIB [facility] pt c/o left shoulder pain after trip and fall over backpack. Left shoulder deformity. Pt medicated PTA with 200mcg Fentanyl by [facility].      /88   Pulse 64   Temp 37.1 °C (98.7 °F)   Resp 18   Ht 1.575 m (5' 2\")   Wt 50 kg (110 lb 1.9 oz)   SpO2 95%   BMI 20.14 kg/m²     "

## 2018-02-28 NOTE — LETTER
"  FORM C-4:  EMPLOYEE’S CLAIM FOR COMPENSATION/ REPORT OF INITIAL TREATMENT  EMPLOYEE’S CLAIM - PROVIDE ALL INFORMATION REQUESTED   First Name  Alysa Last Name  Cornell Birthdate             Age  1950 67 y.o. Sex  female Claim Number   Home Employee Address  [address]                                     Zip  [address] Height  1.575 m (5' 2\") Weight  50 kg (110 lb 1.9 oz) [facility]     Mailing Employee Address                           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 Be specific and answer in detail. Use additional sheet if necessary)  tripped on back pack and fell   If you believe that you have an occupational disease, when did you first have knowledge of the disability and it relationship to your employment?  n/a Witnesses to the Accident  administrators     Nature of Injury or Occupational Disease  Strain  Part(s) of Body Injured or Affected  Shoulder (L), N/A, N/A    I certify that the above is true and correct to the best of my knowledge and that I have provided this information in order to obtain the benefits of [place]  I hereby authorize any physician, chiropractor, surgeon, practitioner, or other person, any hospital, including [facility], any medical service organization, any insurance company, or other institution or organization to release to each other, any medical or other information, including benefits paid or payable, pertinent to this injury or disease, except information relative to diagnosis, treatment and/or counseling for AIDS, psychological conditions, alcohol or controlled substances, for which I must give specific authorization.  A Photostat of this authorization shall be as valid as the original.   Date  February 28, 2018 Place  [facility] Employee’s Signature   THIS REPORT MUST BE COMPLETED AND MAILED WITHIN 3 WORKING DAYS OF TREATMENT   Place  [facility], EMERGENCY DEPT  Name of Facility   [facility]   Date  2/28/2018 Diagnosis  (S43.005A) Dislocation of left shoulder joint, initial encounter Is there evidence the injured employee was under the influence of alcohol and/or another controlled substance at the time of accident?   Hour  5:57 PM Description of Injury or Disease  Dislocation of left shoulder joint, initial encounter No   Treatment  Reduction of dislocation, sling  Have you advised the patient to remain off work five days or more?         No   X-Ray Findings  Positive   If Yes   From Date    To Date      From information given by the employee, together with medical evidence, can you directly connect this injury or occupational disease as job incurred?  Yes If No, is the employee capable of: Full Duty    Modified Duty      Is additional medical care by a physician indicated?  Yes If Modified Duty, Specify any Limitations / Restrictions  Disability to be determined by occupational health tomorrow     Do you know of any previous injury or disease  "contributing to this condition or occupational disease?  No   Date  2/28/2018 Print Doctor’s Name  De Riccardo Krause certify the employer’s copy of this form was mailed on:   Address  [address] [place] [address] [phone] Insurer’s Use Only   [facility] Zip  [address]    Provider’s Tax ID Number    Telephone  Dept: [phone]    Doctor’s Signature  e-SignDE RICCARDO KRAUSE M.D. Degree  MD    Original - TREATING PHYSICIAN OR CHIROPRACTOR   Pg 2-Insurer/TPA   Pg 3-Employer   Pg 4-Employee                                                                                                  Form C-4 (rev01/03)     BRIEF DESCRIPTION OF RIGHTS AND BENEFITS  (Pursuant to [place]RS 616C.050)    Notice of Injury or Occupational Disease (Incident Report Form C-1): If an injury or occupational disease (OD) arises out of and in the course of employment, you must provide written notice to your employer as soon as practicable, but no later than 7 days after the accident or OD. Your employer shall maintain a sufficient supply of the required forms.    Claim for Compensation (Form C-4): If medical treatment is sought, the form C-4 is available at the place of initial treatment. A completed \"Claim for Compensation\" (Form C-4) must be filed within 90 days after an accident or OD. The treating physician or chiropractor must, within 3 working days after treatment, complete and mail to the employer, the employer's insurer and third-party , the Claim for Compensation.    Medical Treatment: If you require medical treatment for your on-the-job injury or OD, you may be required to select a physician or chiropractor from a list provided by your workers’ compensation insurer, if it has contracted with an Organization for Managed Care (MCO) or Preferred Provider Organization (PPO) or providers of health care. If your employer has not entered into a contract with an MCO or PPO, you may select a physician " or chiropractor from the Panel of Physicians and Chiropractors. Any medical costs related to your industrial injury or OD will be paid by your insurer.    Temporary Total Disability (TTD): If your doctor has certified that you are unable to work for a period of at least 5 consecutive days, or 5 cumulative days in a 20-day period, or places restrictions on you that your employer does not accommodate, you may be entitled to TTD compensation.    Temporary Partial Disability (TPD): If the wage you receive upon reemployment is less than the compensation for TTD to which you are entitled, the insurer may be required to pay you TPD compensation to make up the difference. TPD can only be paid for a maximum of 24 months.    Permanent Partial Disability (PPD): When your medical condition is stable and there is an indication of a PPD as a result of your injury or OD, within 30 days, your insurer must arrange for an evaluation by a rating physician or chiropractor to determine the degree of your PPD. The amount of your PPD award depends on the date of injury, the results of the PPD evaluation and your age and wage.    Permanent Total Disability (PTD): If you are medically certified by a treating physician or chiropractor as permanently and totally disabled and have been granted a PTD status by your insurer, you are entitled to receive monthly benefits not to exceed 66 2/3% of your average monthly wage. The amount of your PTD payments is subject to reduction if you previously received a PPD award.    Vocational Rehabilitation Services: You may be eligible for vocational rehabilitation services if you are unable to return to the job due to a permanent physical impairment or permanent restrictions as a result of your injury or occupational disease.    Transportation and Per Meagan Reimbursement: You may be eligible for travel expenses and per meagan associated with medical treatment.  Reopening: You may be able to reopen your claim if  your condition worsens after claim closure.    Appeal Process: If you disagree with a written determination issued by the insurer or the insurer does not respond to your request, you may appeal to the Department of Administration, , by following the instructions contained in your determination letter. You must appeal the determination within 70 days from the date of the determination letter at [address], [address], [place], [place] [address], or [address], [address], [place], [place] [address]. If you disagree with the  decision, you may appeal to the Department of Administration, . You must file your appeal within 30 days from the date of the  decision letter at [address], [address], [place], [place] [address], or [address], [place], [place] [address]. If you disagree with a decision of an , you may file a petition for judicial review with the District Court. You must do so within 30 days of the Appeal Officer’s decision. You may be represented by an  at your own expense or you may contact the [facility] for possible representation.    [place]  for Injured Workers (NAIW): If you disagree with a  decision, you may request that NAIW represent you without charge at an  Hearing. For information regarding denial of benefits, you may contact the [facility] at: [address], [address], [place] [address], [phone], or [address], [place] [address], [phone]    To File a Complaint with the Division: If you wish to file a complaint with the  of the Division of Industrial Relations (DIR), please contact the Workers’ Compensation Section, [address], [address], [place], [place] [address], telephone [phone], or [address], [place], [place] [address], telephone [phone]  [phone].    For assistance with Workers’ Compensation Issues: you may contact the Office of the Governor Consumer Health Assistance, [address] [place] [address], [address], [place] [address], Toll Free 1-351.397.6148, Web site: http://govcha.[facility].nv., E-mail [email or web address].[email or web address].                                                                                                                                                                               __________________________________________________________________                                    _________________            Employee Name / Signature                                                                                                                            Date                                       D-2 (rev. 10/07)

## 2018-02-28 NOTE — LETTER
"  FORM C-4:  EMPLOYEE’S CLAIM FOR COMPENSATION/ REPORT OF INITIAL TREATMENT  EMPLOYEE’S CLAIM - PROVIDE ALL INFORMATION REQUESTED   First Name  Alysa Last Name  Cornell Birthdate             Age  1950 67 y.o. 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 Be specific and answer in detail. Use additional sheet if necessary)  tripped on back pack and fell   If you believe that you have an occupational disease, when did you first have knowledge of the disability and it relationship to your employment?  n/a Witnesses to the Accident  administrators     Nature of Injury or Occupational Disease  Strain  Part(s) of Body Injured or Affected  Shoulder (L), N/A, N/A    I certify that the above is true and correct to the best of my knowledge and that I have provided this information in order to obtain the benefits of [place]’s [place] and Occup[place] " [place] ([place], inclusive or Chapter 617 of [place]RS).  I hereby authorize any physician, chiropractor, surgeon, practitioner, or other person, any hospital, including [facility] hospital, any medical service organization, any insurance company, or other institution or organization to release to each other, any medical or other information, including benefits paid or payable, pertinent to this injury or disease, except information relative to diagnosis, treatment and/or counseling for AIDS, psychological conditions, alcohol or controlled substances, for which I must give specific authorization.  A Photostat of this authorization shall be as valid as the original.   Date Place   Employee’s Signature   THIS REPORT MUST BE COMPLETED AND MAILED WITHIN 3 WORKING DAYS OF TREATMENT   Place  [facility], EMERGENCY DEPT  Name of Facility   [facility]   Date  2/28/2018 Diagnosis  (S43.005A) Dislocation of left shoulder joint, initial encounter Is there evidence the injured employee was under the influence of alcohol and/or another controlled substance at the time of accident?   Hour  5:55 PM Description of Injury or Disease  Dislocation of left shoulder joint, initial encounter     Treatment     Have you advised the patient to remain off work five days or more?             X-Ray Findings      If Yes   From Date    To Date      From information given by the employee, together with medical evidence, can you directly connect this injury or occupational disease as job incurred?    If No, is the employee capable of: Full Duty    Modified Duty      Is additional medical care by a physician indicated?    If Modified Duty, Specify any Limitations / Restrictions        Do you know of any previous injury or disease contributing to this condition or occupational disease?      Date  2/28/2018 Print Doctor’s Name  Rj Cordero I certify the employer’s  "copy of this form was mailed on:   Address  [address] [place] [id number] [phone] Insurer’s Use Only   [facility]  [address]    Provider’s Tax ID Number    Telephone  Dept: [phone]    Doctor’s Signature    Degree       Original - TREATING PHYSICIAN OR CHIROPRACTOR   Pg 2-Insurer/TPA   Pg 3-Employer   Pg 4-Employee                                                                                                  Form C-4 (rev01/03)     BRIEF DESCRIPTION OF RIGHTS AND BENEFITS  ([place]ursuant to [place]RS 616C.050)    Notice of Injury or Occupational Disease (Incident Report Form C-1): If an injury or occupational disease (OD) arises out of and in the course of employment, you must provide written notice to your employer as soon as practicable, but no later than 7 days after the accident or OD. Your employer shall maintain a sufficient supply of the required forms.    Claim for Compensation (Form C-4): If medical treatment is sought, the form C-4 is available at the place of initial treatment. A completed \"Claim for Compensation\" (Form C-4) must be filed within 90 days after an accident or OD. The treating physician or chiropractor must, within 3 working days after treatment, complete and mail to the employer, the employer's insurer and third-party , the Claim for Compensation.    Medical Treatment: If you require medical treatment for your on-the-job injury or OD, you may be required to select a physician or chiropractor from a list provided by your workers’ compensation insurer, if it has contracted with an Organization for Managed Care (MCO) or Preferred Provider Organization (PPO) or providers of health care. If your employer has not entered into a contract with an MCO or PPO, you may select a physician or chiropractor from the Panel of Physicians and Chiropractors. Any medical costs related to your industrial injury or OD will be paid by your insurer.    Temporary Total " Disability (TTD): If your doctor has certified that you are unable to work for a period of at least 5 consecutive days, or 5 cumulative days in a 20-day period, or places restrictions on you that your employer does not accommodate, you may be entitled to TTD compensation.    Temporary Partial Disability (TPD): If the wage you receive upon reemployment is less than the compensation for TTD to which you are entitled, the insurer may be required to pay you TPD compensation to make up the difference. TPD can only be paid for a maximum of 24 months.    Permanent Partial Disability (PPD): When your medical condition is stable and there is an indication of a PPD as a result of your injury or OD, within 30 days, your insurer must arrange for an evaluation by a rating physician or chiropractor to determine the degree of your PPD. The amount of your PPD award depends on the date of injury, the results of the PPD evaluation and your age and wage.    Permanent Total Disability (PTD): If you are medically certified by a treating physician or chiropractor as permanently and totally disabled and have been granted a PTD status by your insurer, you are entitled to receive monthly benefits not to exceed 66 2/3% of your average monthly wage. The amount of your PTD payments is subject to reduction if you previously received a PPD award.    Vocational Rehabilitation Services: You may be eligible for vocational rehabilitation services if you are unable to return to the job due to a permanent physical impairment or permanent restrictions as a result of your injury or occupational disease.    Transportation and Per Meagan Reimbursement: You may be eligible for travel expenses and per meagan associated with medical treatment.  Reopening: You may be able to reopen your claim if your condition worsens after claim closure.    Appeal Process: If you disagree with a written determination issued by the insurer or the insurer does not respond to your  request, you may appeal to the Department of Administration, , by following the instructions contained in your determination letter. You must appeal the determination within 70 days from the date of the determination letter at [address], [address], [place], [place] [address], or [address], [address], [place], [place] [address]. If you disagree with the  decision, you may appeal to the Department of Administration, . You must file your appeal within 30 days from the date of the  decision letter at [address], [address], [place], [place] [address], or [address], [address], [place], [place] [address]. If you disagree with a decision of an , you may file a petition for judicial review with the District Court. You must do so within 30 days of the Appeal Officer’s decision. You may be represented by an  at your own expense or you may contact the [facility] for possible representation.    [place]  for Injured Workers (NAIW): If you disagree with a  decision, you may request that NAIW represent you without charge at an  Hearing. For information regarding denial of benefits, you may contact the [facility] at: [address], [address], [place] [address], [phone], or [address], [place] [address], [phone]    To File a Complaint with the Division: If you wish to file a complaint with the  of the Division of Industrial Relations (DIR), please contact the Workers’ Compensation Section, [address], [address], [place], [place] [address], telephone [phone], or [address], [place] [address], telephone [phone].    For assistance with Workers’ Compensation Issues: you may contact the Office of the Governor Consumer Health Assistance, [address], [address], [place], [place] [address], Toll Free 1-758.739.7149, Web site: http://govcha.[facility].nv., E-mail [email or web address].[email or web address].                                                                                                                                                                               __________________________________________________________________                                    _________________            Employee Name / Signature                                                                                                                            Date                                       D-2 (rev. 10/07)

## 2018-02-28 NOTE — ED NOTES
Pt c/o pain at IV site after meds given.  IV established PTA noted to be infiltrated.  No pain relief.  2nd IV established.  PXR at BS.  Will re-medicate.

## 2018-02-28 NOTE — ED NOTES
Report from Sarah RN - assumed care of patient for RN lunch break. Patient medicated for 10/10 pain. New IV patent. Patient repositioned with additional pillows.  at bedside.

## 2018-03-01 ENCOUNTER — PATIENT OUTREACH (OUTPATIENT)
Dept: HEALTH INFORMATION MANAGEMENT | Facility: OTHER | Age: 68
End: 2018-03-01

## 2018-03-01 NOTE — RESPIRATORY CARE
Conscious Sedation Respiratory Update       O2 (LPM): 3 (02/28/18 6462)  Events/Summary/Plan: conscious sedation (02/28/18 8214)    Provided therapist support during conscious sedation to assure oxygenation, maintain airway, and ventilation throughout the procedure. The patient did maintain her own airway. Her RR was 18-24, her HR was 74-90, her oxygen saturation was kept between 94%-98% while on 3 lpm nasal cannula.

## 2018-03-01 NOTE — DISCHARGE INSTRUCTIONS
Shoulder Dislocation  Your shoulder is made up of three bones: the collar bone (clavicle); the shoulder blade (scapula), which includes the socket (glenoid cavity); and the upper arm bone (humerus). Your shoulder joint is the place where these bones meet. Strong, fibrous tissues hold these bones together (ligaments). Muscles and strong, fibrous tissues that connect the muscles to these bones (tendons) allow your arm to move through this joint. The range of motion of your shoulder joint is more extensive than most of your other joints, and the glenoid cavity is very shallow. That is the reason that your shoulder joint is one of the most unstable joints in your body. It is far more prone to dislocation than your other joints. Shoulder dislocation is when your humerus is forced out of your shoulder joint.  CAUSES  Shoulder dislocation is caused by a forceful impact on your shoulder. This impact usually is from an injury, such as a sports injury or a fall.  SYMPTOMS  Symptoms of shoulder dislocation include:  · Deformity of your shoulder.  · Intense pain.  · Inability to move your shoulder joint.  · Numbness, weakness, or tingling around your shoulder joint (your neck or down your arm).  · Bruising or swelling around your shoulder.  DIAGNOSIS  In order to diagnose a dislocated shoulder, your caregiver will perform a physical exam. Your caregiver also may have an X-ray exam done to see if you have any broken bones. Magnetic resonance imaging (MRI) is a procedure that sometimes is done to help your caregiver see any damage to the soft tissues around your shoulder, particularly your rotator cuff tendons. Additionally, your caregiver also may have electromyography done to measure the electrical discharges produced in your muscles if you have signs or symptoms of nerve damage.  TREATMENT  A shoulder dislocation is treated by placing the humerus back in the joint (reduction). Your caregiver does this either manually (closed  reduction), by moving your humerus back into the joint through manipulation, or through surgery (open reduction). When your humerus is back in place, severe pain should improve almost immediately.  You also may need to have surgery if you have a weak shoulder joint or ligaments, and you have recurring shoulder dislocations, despite rehabilitation. In rare cases, surgery is necessary if your nerves or blood vessels are damaged during the dislocation.  After your reduction, your arm will be placed in a shoulder immobilizer or sling to keep it from moving. Your caregiver will have you wear your shoulder immobilizer or sling for 3 days to 3 weeks, depending on how serious your dislocation is. When your shoulder immobilizer or sling is removed, your caregiver may prescribe physical therapy to help improve the range of motion in your shoulder joint.  HOME CARE INSTRUCTIONS   The following measures can help to reduce pain and speed up the healing process:  · Rest your injured joint. Do not move it. Avoid activities similar to the one that caused your injury.  · Apply ice to your injured joint for the first day or two after your reduction or as directed by your caregiver. Applying ice helps to reduce inflammation and pain.  ¨ Put ice in a plastic bag.  ¨ Place a towel between your skin and the bag.  ¨ Leave the ice on for 15-20 minutes at a time, every 2 hours while you are awake.  · Exercise your hand by squeezing a soft ball. This helps to eliminate stiffness and swelling in your hand and wrist.  · Take over-the-counter or prescription medicine for pain or discomfort as told by your caregiver.  SEEK IMMEDIATE MEDICAL CARE IF:   · Your shoulder immobilizer or sling becomes damaged.  · Your pain becomes worse rather than better.  · You lose feeling in your arm or hand, or they become white and cold.  MAKE SURE YOU:   · Understand these instructions.  · Will watch your condition.  · Will get help right away if you are not  doing well or get worse.     This information is not intended to replace advice given to you by your health care provider. Make sure you discuss any questions you have with your health care provider.     Document Released: 09/12/2002 Document Revised: 01/08/2016 Document Reviewed: 04/11/2016  [employer] Interactive Patient Education ©2016 [employer]

## 2018-03-01 NOTE — ED NOTES
Discharge and f/u instructions discussed and understanding verbalized.  Ambulatory out of ED w/ immobilizer to LUE.

## 2018-03-01 NOTE — PROGRESS NOTES
Placed discharge outreach phone call to patient s/p ER discharge 2/28/18.  Left voicemail providing my contact information and instructions to call with any questions or concerns.

## 2018-03-01 NOTE — ED NOTES
Conscious sedation by FAY. RT [facility] and [facility] at bedside. Patient received 40 mg propofol.   Patient tolerated well. Shoulder deformity absent. Confirmation x-ray ordered. Report to Sarah BAXTER.

## 2018-04-14 ENCOUNTER — HOSPITAL ENCOUNTER (OUTPATIENT)
Dept: LAB | Facility: MEDICAL CENTER | Age: 68
End: 2018-04-14
Attending: INTERNAL MEDICINE
Payer: COMMERCIAL

## 2018-04-14 DIAGNOSIS — D72.819 LEUKOPENIA, UNSPECIFIED TYPE: ICD-10-CM

## 2018-04-14 LAB
BASOPHILS # BLD AUTO: 2.3 % (ref 0–1.8)
BASOPHILS # BLD: 0.07 K/UL (ref 0–0.12)
EOSINOPHIL # BLD AUTO: 0.06 K/UL (ref 0–0.51)
EOSINOPHIL NFR BLD: 2 % (ref 0–6.9)
ERYTHROCYTE [DISTWIDTH] IN BLOOD BY AUTOMATED COUNT: 43.8 FL (ref 35.9–50)
ERYTHROCYTE [SEDIMENTATION RATE] IN BLOOD BY WESTERGREN METHOD: 9 MM/HOUR (ref 0–30)
HCT VFR BLD AUTO: 40.8 % (ref 37–47)
HGB BLD-MCNC: 13.6 G/DL (ref 12–16)
IMM GRANULOCYTES # BLD AUTO: 0.01 K/UL (ref 0–0.11)
IMM GRANULOCYTES NFR BLD AUTO: 0.3 % (ref 0–0.9)
LDH SERPL L TO P-CCNC: 150 U/L (ref 107–266)
LYMPHOCYTES # BLD AUTO: 1.09 K/UL (ref 1–4.8)
LYMPHOCYTES NFR BLD: 35.7 % (ref 22–41)
MCH RBC QN AUTO: 30.6 PG (ref 27–33)
MCHC RBC AUTO-ENTMCNC: 33.3 G/DL (ref 33.6–35)
MCV RBC AUTO: 91.9 FL (ref 81.4–97.8)
MONOCYTES # BLD AUTO: 0.3 K/UL (ref 0–0.85)
MONOCYTES NFR BLD AUTO: 9.8 % (ref 0–13.4)
NEUTROPHILS # BLD AUTO: 1.52 K/UL (ref 2–7.15)
NEUTROPHILS NFR BLD: 49.9 % (ref 44–72)
NRBC # BLD AUTO: 0 K/UL
NRBC BLD-RTO: 0 /100 WBC
PLATELET # BLD AUTO: 410 K/UL (ref 164–446)
PMV BLD AUTO: 9.3 FL (ref 9–12.9)
RBC # BLD AUTO: 4.44 M/UL (ref 4.2–5.4)
WBC # BLD AUTO: 3.1 K/UL (ref 4.8–10.8)

## 2018-04-14 PROCEDURE — 85652 RBC SED RATE AUTOMATED: CPT

## 2018-04-14 PROCEDURE — 85025 COMPLETE CBC W/AUTO DIFF WBC: CPT

## 2018-04-14 PROCEDURE — 83615 LACTATE (LD) (LDH) ENZYME: CPT

## 2018-04-14 PROCEDURE — 36415 COLL VENOUS BLD VENIPUNCTURE: CPT

## 2018-04-23 ENCOUNTER — OFFICE VISIT (OUTPATIENT)
Dept: MEDICAL GROUP | Facility: MEDICAL CENTER | Age: 68
End: 2018-04-23
Payer: COMMERCIAL

## 2018-04-23 VITALS
TEMPERATURE: 98.5 F | HEART RATE: 68 BPM | WEIGHT: 111.8 LBS | HEIGHT: 62 IN | DIASTOLIC BLOOD PRESSURE: 74 MMHG | BODY MASS INDEX: 20.57 KG/M2 | OXYGEN SATURATION: 97 % | SYSTOLIC BLOOD PRESSURE: 124 MMHG

## 2018-04-23 DIAGNOSIS — D72.819 LEUKOPENIA, UNSPECIFIED TYPE: ICD-10-CM

## 2018-04-23 DIAGNOSIS — K76.89 BENIGN LIVER CYST: ICD-10-CM

## 2018-04-23 DIAGNOSIS — N13.30 HYDRONEPHROSIS OF RIGHT KIDNEY: ICD-10-CM

## 2018-04-23 DIAGNOSIS — Z00.00 HEALTH CARE MAINTENANCE: ICD-10-CM

## 2018-04-23 DIAGNOSIS — I10 ESSENTIAL HYPERTENSION: ICD-10-CM

## 2018-04-23 DIAGNOSIS — E87.1 HYPONATREMIA: ICD-10-CM

## 2018-04-23 PROCEDURE — 99214 OFFICE O/P EST MOD 30 MIN: CPT | Performed by: INTERNAL MEDICINE

## 2018-04-23 NOTE — PROGRESS NOTES
CHIEF COMPLAINT  Chief Complaint   Patient presents with   • Results     lab results   HTN    HPI  Patient is a 67 y.o. female patient who presents today for the following     Hypertension  Meds: valsartan, 320 mg QD, taking as prescribed.    Measuring BP at home: no  No headaches, vision problems, tinnitus.  No chest pain/pressure, palpitations, irregular heart beats, exertional dyspnea, peripheral edema.  Diet: healthy Low salt diet: no  Exercise: yes  BMI: 20     Hyponatremia  The patient has had low Na since 2007, on/off.    She has normal salt intake.    She is not on diuretic / SSRI.    TSH was normal.    There is no identifiable water loss.   Reviewed the renal panel.     Hydronephrosis of the right kidney  Incidental finding on US abdomen on 5/16/2016, improving.    She was evaluated by urology, recommended ultrasound follow-up once in a year              - The last ultrasound was in 10/17     Denies flank pain, hematuria, change in urinary habits.      Liver cyst, benign  She has h/o liver cyst, with the last US abdomen on 11/18/17.    Denies RUQ pain.   MRI abdomen in 7/17.               - No need to f/u MRI  further.     Leukopenia  The patient was found to have borderline leukopenia/neutropenia, with a decreased neutrophil count.    She has not had frequent infections/LAD.     Reviewed PMH, PSH, FH, SH, ALL, HCM/IMM, no changes  Reviewed MEDS, no changes    Patient Active Problem List    Diagnosis Date Noted   • Hydronephrosis of right kidney 05/13/2016     Priority: Medium   • Asymptomatic postmenopausal state 10/23/2017   • Benign liver cyst 07/24/2017   • History of small bowel obstruction 03/06/2017   • Uterine leiomyoma 01/03/2017   • Health care maintenance 07/22/2016   • Essential hypertension 09/03/2015     CURRENT MEDICATIONS  Current Outpatient Prescriptions   Medication Sig Dispense Refill   • Cyanocobalamin (VITAMIN B-12 PO) Take 1 Tab by mouth every day.     • valsartan (DIOVAN) 320 MG  tablet Take 1 Tab by mouth every day. 90 Tab 3   • LUTEIN PO Take  by mouth.     • multivitamin (THERAGRAN) Tab Take 1 Tab by mouth every day.       No current facility-administered medications for this visit.      ALLERGIES  Allergies: Diltiazem  PAST MEDICAL HISTORY  Past Medical History:   Diagnosis Date   • Hydronephrosis    • Hypertension    • Hyponatremia    • Leukopenia    • Liver cyst     benign, no f/u   • Small bowel obstruction     R   • Varicose vein of leg      SURGICAL HISTORY  She  has a past surgical history that includes lumpectomy; breast biopsy (2007); colon resection laparoscopic (3/27/2014); exploratory laparotomy (5/3/2016); cystoscopy stent placement (Right, 5/13/2016); retrogrades (5/13/2016); and exploratory laparotomy (12/14/2016).  SOCIAL HISTORY  Social History   Substance Use Topics   • Smoking status: Never Smoker   • Smokeless tobacco: Never Used   • Alcohol use Not on file      Comment: 4 per week     Social History     Social History Narrative    Lives with .     Children: 1    Work: teacher in [employer]     FAMILY HISTORY  Family History   Problem Relation Age of Onset   • Hyperlipidemia Mother    • Hypertension Mother    • Heart Disease Father    • Cancer Brother      neck   • Alcohol/Drug Neg Hx    • Allergies Neg Hx    • Diabetes Neg Hx    • Psychiatry Neg Hx    • Stroke Neg Hx    • Thyroid Neg Hx      Family Status   Relation Status   • Mother Alive   • Father Alive   • Brother    • Neg Hx      ROS   Constitutional: Negative for fever, chills.  HENT: Negative for congestion, sore throat.  Eyes: Negative for blurred vision.   Respiratory: Negative for cough, shortness of breath.  Cardiovascular: Negative for chest pain, palpitations. And per HPI.  Gastrointestinal: Negative for heartburn, nausea, abdominal pain. And per HPI.  Genitourinary: Negative for dysuria. And per HPI.  Musculoskeletal: Negative for significant myalgias, back pain and joint  "pain.   Skin: Negative for rash and itching.   Neuro: Negative for dizziness, weakness and headaches.   Endo/Heme/Allergies: Does not bruise/bleed easily. And per HPI.  Psychiatric/Behavioral: Negative for depression, anxiety    PHYSICAL EXAM   /74   Pulse 68   Temp 36.9 °C (98.5 °F)   Ht 1.575 m (5' 2\")   Wt 50.7 kg (111 lb 12.8 oz)   SpO2 97%   BMI 20.45 kg/m²   General:  NAD, well appearing  HEENT:   NC/AT, PERRLA, EOMI, TMs are clear. Oropharyngeal mucosa is pink,  without lesions;  no cervical / supraclavicular  lymphadenopathy, no thyromegaly.    Cardiovascular: RRR.   No m/r/g. No carotid bruits .      Lungs:   CTAB, no w/r/r, no respiratory distress.  Abdomen: Soft, NT/ND + BS; no suprapubic tenderness; no masses or hepatosplenomegaly.  Extremities:  2+ DP and radial pulses bilaterally.  No c/c/e.   Skin:  Warm, dry.  No erythema. No rash.   Neurologic: Alert & oriented x 3. No focal deficits.  Psychiatric:  Affect normal, mood normal, judgment normal.    LABS     Labs are reviewed and discussed with a patient  Lab Results   Component Value Date/Time    CHOLSTRLTOT 197 09/26/2017 07:39 AM     (H) 09/26/2017 07:39 AM    HDL 79 09/26/2017 07:39 AM    TRIGLYCERIDE 64 09/26/2017 07:39 AM       Lab Results   Component Value Date/Time    SODIUM 133 (L) 10/21/2017 07:22 AM    POTASSIUM 4.2 10/21/2017 07:22 AM    CHLORIDE 100 10/21/2017 07:22 AM    CO2 27 10/21/2017 07:22 AM    GLUCOSE 82 10/21/2017 07:22 AM    BUN 11 10/21/2017 07:22 AM    CREATININE 0.51 10/21/2017 07:22 AM    CREATININE 0.6 05/02/2007 09:18 AM     Lab Results   Component Value Date/Time    ALKPHOSPHAT 74 10/21/2017 07:22 AM    ASTSGOT 23 10/21/2017 07:22 AM    ALTSGPT 14 10/21/2017 07:22 AM    TBILIRUBIN 0.8 10/21/2017 07:22 AM     Lab Results   Component Value Date/Time    WBC 3.1 (L) 04/14/2018 08:13 AM    RBC 4.44 04/14/2018 08:13 AM    HEMOGLOBIN 13.6 04/14/2018 08:13 AM    HEMATOCRIT 40.8 04/14/2018 08:13 AM    MCV 91.9 " 04/14/2018 08:13 AM    MCH 30.6 04/14/2018 08:13 AM    MCHC 33.3 (L) 04/14/2018 08:13 AM    MPV 9.3 04/14/2018 08:13 AM    NEUTSPOLYS 49.90 04/14/2018 08:13 AM    LYMPHOCYTES 35.70 04/14/2018 08:13 AM    MONOCYTES 9.80 04/14/2018 08:13 AM    EOSINOPHILS 2.00 04/14/2018 08:13 AM    BASOPHILS 2.30 (H) 04/14/2018 08:13 AM    ANISOCYTOSIS 1+ 05/05/2016 04:10 AM      IMAGING     Reviewed the following imaging:    Renal ultrasound from 10/12/17:  The right kidney measures 11.85 cm.  Again there is mild hydronephrosis.  The left kidney measures 11.28 cm. It is partially obscured by bowel gas  There is no left hydronephrosis.  There are no abnormal calcifications.  The bladder demonstrates no focal wall abnormality.  Bilateral ureteral jets are confirmed.    Ultrasound gallbladder, 11/18/17:  1.  10 x 10 x 12 mm left hepatic lobe septated cyst.  2.  3 mm gallbladder polyp.  3.  Again seen mild right hydronephrosis.    ASSESMENT AND PLAN        1. Essential hypertension  Controlled, continue with current treatment  - COMP METABOLIC PANEL; Future    2. Hyponatremia  Stable, advised to continue liberal salt intake  - COMP METABOLIC PANEL; Future    3. Hydronephrosis of right kidney  Reviewed previous ultrasound, need follow-up  - US-ABDOMEN COMPLETE SURVEY; Future    4. Benign liver cyst  Small benign, will be followed by ultrasound of abdomen  - US-ABDOMEN COMPLETE SURVEY; Future    5. Leukopenia, unspecified type  Stable, follow-up CBC;   Discussed about hematology referral with the patient, postponed for the next office visit  - CBC WITH DIFFERENTIAL; Future    6. Health care maintenance  Bone density done by GYN order, on [facility].  Pending records    Counseling:   - Smoking:  Nonsmoker    Followup: Return in about 4 months (around 8/23/2018) for Short.    All questions are answered.    Please note that this dictation was created using voice recognition software, and that there might be errors of ashok and possibly  content.

## 2018-04-25 ENCOUNTER — TELEPHONE (OUTPATIENT)
Dept: MEDICAL GROUP | Facility: MEDICAL CENTER | Age: 68
End: 2018-04-25

## 2018-04-25 NOTE — TELEPHONE ENCOUNTER
Phone Number Called: [phone] (home)     Message: Pt called and wanted to verify that she needed the US Abdomen complete survey. GI already ordered one that pt states is for her liver. Imaging stated that these would be the same images and she does not want to get them twice if she does not have to. Please advise.    Left Message for patient to call back: N\A

## 2018-05-08 ENCOUNTER — TELEPHONE (OUTPATIENT)
Dept: MEDICAL GROUP | Facility: MEDICAL CENTER | Age: 68
End: 2018-05-08

## 2018-05-08 NOTE — TELEPHONE ENCOUNTER
Phone Number Called: [phone] (home)     Message: Patient called because she accidentally tooke 2 of her blood pressure medication pills (Valsartan).  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  Pt reported feeling dizzy. Advised to have school nurse check blood pressure throughout the day and if she feels she should, UC recommended if symptoms worsen.    Left Message for patient to call back: no

## 2018-05-26 ENCOUNTER — HOSPITAL ENCOUNTER (OUTPATIENT)
Dept: RADIOLOGY | Facility: MEDICAL CENTER | Age: 68
End: 2018-05-26
Attending: INTERNAL MEDICINE
Payer: COMMERCIAL

## 2018-05-26 DIAGNOSIS — K82.4 GALLBLADDER POLYP: ICD-10-CM

## 2018-05-26 DIAGNOSIS — K76.89 HEPATIC CYST: ICD-10-CM

## 2018-05-26 DIAGNOSIS — R91.1 INCIDENTAL LUNG NODULE, > 3MM AND < 8MM: ICD-10-CM

## 2018-05-26 PROCEDURE — 76705 ECHO EXAM OF ABDOMEN: CPT

## 2018-05-26 PROCEDURE — 71250 CT THORAX DX C-: CPT

## 2018-07-16 ENCOUNTER — TELEPHONE (OUTPATIENT)
Dept: MEDICAL GROUP | Facility: MEDICAL CENTER | Age: 68
End: 2018-07-16

## 2018-07-16 DIAGNOSIS — I10 ESSENTIAL HYPERTENSION: ICD-10-CM

## 2018-07-16 RX ORDER — LOSARTAN POTASSIUM 100 MG/1
100 TABLET ORAL DAILY
Qty: 30 TAB | Refills: 2 | Status: SHIPPED | OUTPATIENT
Start: 2018-07-16 | End: 2018-08-27 | Stop reason: SDUPTHER

## 2018-08-20 ENCOUNTER — TELEPHONE (OUTPATIENT)
Dept: MEDICAL GROUP | Facility: MEDICAL CENTER | Age: 68
End: 2018-08-20

## 2018-08-20 NOTE — TELEPHONE ENCOUNTER
1. Caller Name: Alysa                                         Call Back Number: [phone] (home)       Patient approves a detailed voicemail message: no    Pt states that blood pressures have been really high 140 /90, 148/98,136/87, 138/78.  Please advise- have already changed medication to losartan 100 mg

## 2018-08-21 NOTE — TELEPHONE ENCOUNTER
Phone Number Called: [phone] (home)     Message: Called pt to make an appt, REG.    Left Message for patient to call back: yes

## 2018-08-27 ENCOUNTER — OFFICE VISIT (OUTPATIENT)
Dept: MEDICAL GROUP | Facility: MEDICAL CENTER | Age: 68
End: 2018-08-27
Payer: COMMERCIAL

## 2018-08-27 VITALS
DIASTOLIC BLOOD PRESSURE: 78 MMHG | OXYGEN SATURATION: 99 % | BODY MASS INDEX: 21.02 KG/M2 | SYSTOLIC BLOOD PRESSURE: 122 MMHG | HEART RATE: 74 BPM | HEIGHT: 62 IN | WEIGHT: 114.2 LBS | TEMPERATURE: 97.8 F

## 2018-08-27 DIAGNOSIS — I10 ESSENTIAL HYPERTENSION: ICD-10-CM

## 2018-08-27 DIAGNOSIS — F41.8 SITUATIONAL ANXIETY: ICD-10-CM

## 2018-08-27 PROBLEM — Z78.0 ASYMPTOMATIC POSTMENOPAUSAL STATE: Status: RESOLVED | Noted: 2017-10-23 | Resolved: 2018-08-27

## 2018-08-27 PROCEDURE — 99214 OFFICE O/P EST MOD 30 MIN: CPT | Performed by: PHYSICIAN ASSISTANT

## 2018-08-27 RX ORDER — LORAZEPAM 0.5 MG/1
0.5 TABLET ORAL EVERY 4 HOURS PRN
Qty: 10 TAB | Refills: 0 | Status: SHIPPED | OUTPATIENT
Start: 2018-08-27 | End: 2018-08-27 | Stop reason: SDUPTHER

## 2018-08-27 RX ORDER — LORAZEPAM 0.5 MG/1
0.5 TABLET ORAL EVERY 4 HOURS PRN
Qty: 10 TAB | Refills: 0 | Status: SHIPPED | OUTPATIENT
Start: 2018-08-27 | End: 2019-04-25 | Stop reason: SDUPTHER

## 2018-08-27 RX ORDER — LOSARTAN POTASSIUM 100 MG/1
100 TABLET ORAL DAILY
Qty: 90 TAB | Refills: 3 | Status: SHIPPED | OUTPATIENT
Start: 2018-08-27 | End: 2019-04-25 | Stop reason: SDUPTHER

## 2018-08-27 ASSESSMENT — PATIENT HEALTH QUESTIONNAIRE - PHQ9: CLINICAL INTERPRETATION OF PHQ2 SCORE: 0

## 2018-08-27 NOTE — TELEPHONE ENCOUNTER
Phone Number Called: [phone] (home)     Message: Called pt again to try and schedule appt. LM.    Left Message for patient to call back: yes

## 2018-08-28 NOTE — PROGRESS NOTES
"Subjective:   Alysa Sarabia is a 68 y.o. female here today for hypertension and situational anxiety.    Essential hypertension  This is a 68-year-old female who is here today to discuss her chronic history of hypertension. Although chronic she was only recently in July placed on medication. Given Cozaar 100 mg daily. She has a blood pressure cuff at home but the readings have been erratic. Here today to have her blood pressure evaluated. Denies any chest pain or shortness of breath.    Situational anxiety  Has chronic anxiety. She will have anxiety prior to her job if there is testing. She is a  at [employer]. Denies any chronic anxiety. No depression. The past she was given Ativan by her son and it was a very small pill that helped with her condition. Doesn't have symptoms weekly.       Current medicines (including changes today)  Current Outpatient Prescriptions   Medication Sig Dispense Refill   • losartan (COZAAR) 100 MG Tab Take 1 Tab by mouth every day. 90 Tab 3   • LORazepam (ATIVAN) 0.5 MG Tab Take 1 Tab by mouth every four hours as needed for Anxiety for up to 30 days. 10 Tab 0   • LUTEIN PO Take  by mouth.     • multivitamin (THERAGRAN) Tab Take 1 Tab by mouth every day.       No current facility-administered medications for this visit.      She  has a past medical history of Hydronephrosis; Hypertension; Hyponatremia; Leukopenia; Liver cyst; Small bowel obstruction (HCC); and Varicose vein of leg.    Social History and Family History were reviewed and updated.    ROS   No chest pain, no shortness of breath, no abdominal pain and all other systems were reviewed and are negative.       Objective:     Blood pressure 122/78, pulse 74, temperature 36.6 °C (97.8 °F), height 1.575 m (5' 2\"), weight 51.8 kg (114 lb 3.2 oz), SpO2 99 %. Body mass index is 20.89 kg/m².   Physical Exam:  Constitutional: Alert, no distress.  Skin: Warm, dry, good turgor, no rashes in visible " areas.  Eye: Equal, round and reactive, conjunctiva clear, lids normal.  ENMT: Lips without lesions, good dentition, oropharynx clear.  Neck: Trachea midline, no masses.   Lymph: No cervical or supraclavicular lymphadenopathy  Respiratory: Unlabored respiratory effort, lungs clear to auscultation, no wheezes, no ronchi.  Cardiovascular: Normal S1, S2, no murmur, no edema.  Psych: Alert and oriented x3, normal affect and mood.        Assessment and Plan:   The following treatment plan was discussed    1. Essential hypertension  Chronic condition. Controlled. I rechecked her blood pressure and it appears good in both arms. Continue Cozaar provided a longer prescription. Follow up with her PCP in October.  - losartan (COZAAR) 100 MG Tab; Take 1 Tab by mouth every day.  Dispense: 90 Tab; Refill: 3    2. Situational anxiety  New condition noted but a chronic condition. Controlled substance agreement was signed.  reviewed. Provided Ativan 0.5 mg. Humphries tablet in half if needed. Provided a 10 tablet course. Advised if she needs refills she should follow-up with her PCP.  - Controlled Substance Treatment Agreement  - LORazepam (ATIVAN) 0.5 MG Tab; Take 1 Tab by mouth every four hours as needed for Anxiety for up to 30 days.  Dispense: 10 Tab; Refill: 0    Patient was seen for 25 minutes face to face of which > 50% of appointment time was spent on counseling and coordination of care regarding the above.      Followup: Return if symptoms worsen or fail to improve, for with PCP in October.    Please note that this dictation was created using voice recognition software. I have made every reasonable attempt to correct obvious errors, but I expect that there are errors of grammar and possibly content that I did not discover before finalizing the note.

## 2018-09-15 ENCOUNTER — HOSPITAL ENCOUNTER (OUTPATIENT)
Dept: LAB | Facility: MEDICAL CENTER | Age: 68
End: 2018-09-15
Payer: COMMERCIAL

## 2018-09-15 ENCOUNTER — HOSPITAL ENCOUNTER (OUTPATIENT)
Dept: LAB | Facility: MEDICAL CENTER | Age: 68
End: 2018-09-15
Attending: INTERNAL MEDICINE
Payer: COMMERCIAL

## 2018-09-15 DIAGNOSIS — D72.819 LEUKOPENIA, UNSPECIFIED TYPE: ICD-10-CM

## 2018-09-15 DIAGNOSIS — I10 ESSENTIAL HYPERTENSION: ICD-10-CM

## 2018-09-15 DIAGNOSIS — E87.1 HYPONATREMIA: ICD-10-CM

## 2018-09-15 LAB
ALBUMIN SERPL BCP-MCNC: 4.3 G/DL (ref 3.2–4.9)
ALBUMIN SERPL BCP-MCNC: 4.5 G/DL (ref 3.2–4.9)
ALBUMIN/GLOB SERPL: 1.5 G/DL
ALBUMIN/GLOB SERPL: 1.6 G/DL
ALP SERPL-CCNC: 71 U/L (ref 30–99)
ALP SERPL-CCNC: 72 U/L (ref 30–99)
ALT SERPL-CCNC: 15 U/L (ref 2–50)
ALT SERPL-CCNC: 16 U/L (ref 2–50)
ANION GAP SERPL CALC-SCNC: 5 MMOL/L (ref 0–11.9)
ANION GAP SERPL CALC-SCNC: 5 MMOL/L (ref 0–11.9)
AST SERPL-CCNC: 26 U/L (ref 12–45)
AST SERPL-CCNC: 26 U/L (ref 12–45)
BASOPHILS # BLD AUTO: 1.4 % (ref 0–1.8)
BASOPHILS # BLD: 0.05 K/UL (ref 0–0.12)
BDY FAT % MEASURED: 32 %
BILIRUB SERPL-MCNC: 1.1 MG/DL (ref 0.1–1.5)
BILIRUB SERPL-MCNC: 1.1 MG/DL (ref 0.1–1.5)
BP DIAS: 79 MMHG
BP SYS: 122 MMHG
BUN SERPL-MCNC: 8 MG/DL (ref 8–22)
BUN SERPL-MCNC: 8 MG/DL (ref 8–22)
CALCIUM SERPL-MCNC: 9.1 MG/DL (ref 8.5–10.5)
CALCIUM SERPL-MCNC: 9.3 MG/DL (ref 8.5–10.5)
CHLORIDE SERPL-SCNC: 95 MMOL/L (ref 96–112)
CHLORIDE SERPL-SCNC: 96 MMOL/L (ref 96–112)
CHOLEST SERPL-MCNC: 198 MG/DL (ref 100–199)
CO2 SERPL-SCNC: 28 MMOL/L (ref 20–33)
CO2 SERPL-SCNC: 29 MMOL/L (ref 20–33)
CREAT SERPL-MCNC: 0.51 MG/DL (ref 0.5–1.4)
CREAT SERPL-MCNC: 0.54 MG/DL (ref 0.5–1.4)
DIABETES HTDIA: NO
EOSINOPHIL # BLD AUTO: 0.06 K/UL (ref 0–0.51)
EOSINOPHIL NFR BLD: 1.7 % (ref 0–6.9)
ERYTHROCYTE [DISTWIDTH] IN BLOOD BY AUTOMATED COUNT: 42.5 FL (ref 35.9–50)
EVENT NAME HTEVT: NORMAL
GLOBULIN SER CALC-MCNC: 2.9 G/DL (ref 1.9–3.5)
GLOBULIN SER CALC-MCNC: 2.9 G/DL (ref 1.9–3.5)
GLUCOSE SERPL-MCNC: 94 MG/DL (ref 65–99)
GLUCOSE SERPL-MCNC: 94 MG/DL (ref 65–99)
HCT VFR BLD AUTO: 41.7 % (ref 37–47)
HDLC SERPL-MCNC: 74 MG/DL
HGB BLD-MCNC: 14.1 G/DL (ref 12–16)
HYPERTENSION HTHYP: YES
IMM GRANULOCYTES # BLD AUTO: 0.01 K/UL (ref 0–0.11)
IMM GRANULOCYTES NFR BLD AUTO: 0.3 % (ref 0–0.9)
LDLC SERPL CALC-MCNC: 111 MG/DL
LYMPHOCYTES # BLD AUTO: 1.11 K/UL (ref 1–4.8)
LYMPHOCYTES NFR BLD: 31.9 % (ref 22–41)
MCH RBC QN AUTO: 29.9 PG (ref 27–33)
MCHC RBC AUTO-ENTMCNC: 33.8 G/DL (ref 33.6–35)
MCV RBC AUTO: 88.5 FL (ref 81.4–97.8)
MONOCYTES # BLD AUTO: 0.32 K/UL (ref 0–0.85)
MONOCYTES NFR BLD AUTO: 9.2 % (ref 0–13.4)
NEUTROPHILS # BLD AUTO: 1.93 K/UL (ref 2–7.15)
NEUTROPHILS NFR BLD: 55.5 % (ref 44–72)
NRBC # BLD AUTO: 0 K/UL
NRBC BLD-RTO: 0 /100 WBC
PLATELET # BLD AUTO: 399 K/UL (ref 164–446)
PMV BLD AUTO: 9.2 FL (ref 9–12.9)
POTASSIUM SERPL-SCNC: 4.2 MMOL/L (ref 3.6–5.5)
POTASSIUM SERPL-SCNC: 4.3 MMOL/L (ref 3.6–5.5)
PROT SERPL-MCNC: 7.2 G/DL (ref 6–8.2)
PROT SERPL-MCNC: 7.4 G/DL (ref 6–8.2)
RBC # BLD AUTO: 4.71 M/UL (ref 4.2–5.4)
SCREENING LOC CITY HTCIT: NORMAL
SCREENING LOC STATE HTSTA: NORMAL
SCREENING LOCATION HTLOC: NORMAL
SODIUM SERPL-SCNC: 129 MMOL/L (ref 135–145)
SODIUM SERPL-SCNC: 129 MMOL/L (ref 135–145)
SUBSCRIBER ID HTSID: NORMAL
TRIGL SERPL-MCNC: 66 MG/DL (ref 0–149)
WBC # BLD AUTO: 3.5 K/UL (ref 4.8–10.8)

## 2018-09-15 PROCEDURE — S5190 WELLNESS ASSESSMENT BY NONPH: HCPCS

## 2018-09-15 PROCEDURE — 85025 COMPLETE CBC W/AUTO DIFF WBC: CPT

## 2018-09-15 PROCEDURE — 80053 COMPREHEN METABOLIC PANEL: CPT

## 2018-09-15 PROCEDURE — 80053 COMPREHEN METABOLIC PANEL: CPT | Mod: 91

## 2018-09-15 PROCEDURE — 80061 LIPID PANEL: CPT

## 2018-09-15 PROCEDURE — 36415 COLL VENOUS BLD VENIPUNCTURE: CPT

## 2018-09-24 ENCOUNTER — HOSPITAL ENCOUNTER (OUTPATIENT)
Dept: RADIOLOGY | Facility: MEDICAL CENTER | Age: 68
End: 2018-09-24
Attending: OBSTETRICS & GYNECOLOGY
Payer: COMMERCIAL

## 2018-09-24 DIAGNOSIS — Z12.39 SCREENING BREAST EXAMINATION: ICD-10-CM

## 2018-09-24 PROCEDURE — 77067 SCR MAMMO BI INCL CAD: CPT

## 2018-10-16 ENCOUNTER — OFFICE VISIT (OUTPATIENT)
Dept: MEDICAL GROUP | Facility: MEDICAL CENTER | Age: 68
End: 2018-10-16
Payer: COMMERCIAL

## 2018-10-16 ENCOUNTER — TELEPHONE (OUTPATIENT)
Dept: MEDICAL GROUP | Facility: MEDICAL CENTER | Age: 68
End: 2018-10-16

## 2018-10-16 VITALS
BODY MASS INDEX: 20.61 KG/M2 | TEMPERATURE: 99.1 F | SYSTOLIC BLOOD PRESSURE: 116 MMHG | OXYGEN SATURATION: 100 % | RESPIRATION RATE: 14 BRPM | DIASTOLIC BLOOD PRESSURE: 70 MMHG | WEIGHT: 111.99 LBS | HEART RATE: 80 BPM | HEIGHT: 62 IN

## 2018-10-16 DIAGNOSIS — Z78.0 MENOPAUSE: ICD-10-CM

## 2018-10-16 DIAGNOSIS — Z23 NEED FOR INFLUENZA VACCINATION: ICD-10-CM

## 2018-10-16 DIAGNOSIS — K82.4 GALLBLADDER POLYP: ICD-10-CM

## 2018-10-16 DIAGNOSIS — Z12.31 ENCOUNTER FOR SCREENING MAMMOGRAM FOR MALIGNANT NEOPLASM OF BREAST: ICD-10-CM

## 2018-10-16 DIAGNOSIS — I10 ESSENTIAL HYPERTENSION: ICD-10-CM

## 2018-10-16 DIAGNOSIS — N13.30 HYDRONEPHROSIS OF RIGHT KIDNEY: ICD-10-CM

## 2018-10-16 DIAGNOSIS — D72.819 LEUKOPENIA, UNSPECIFIED TYPE: ICD-10-CM

## 2018-10-16 DIAGNOSIS — E87.1 HYPONATREMIA: ICD-10-CM

## 2018-10-16 DIAGNOSIS — K76.89 BENIGN LIVER CYST: ICD-10-CM

## 2018-10-16 PROBLEM — F41.8 SITUATIONAL ANXIETY: Status: RESOLVED | Noted: 2018-08-27 | Resolved: 2018-10-16

## 2018-10-16 PROBLEM — Z87.19 HISTORY OF SMALL BOWEL OBSTRUCTION: Status: RESOLVED | Noted: 2017-03-06 | Resolved: 2018-10-16

## 2018-10-16 PROCEDURE — 90471 IMMUNIZATION ADMIN: CPT | Performed by: INTERNAL MEDICINE

## 2018-10-16 PROCEDURE — 90662 IIV NO PRSV INCREASED AG IM: CPT | Performed by: INTERNAL MEDICINE

## 2018-10-16 PROCEDURE — 99214 OFFICE O/P EST MOD 30 MIN: CPT | Mod: 25 | Performed by: INTERNAL MEDICINE

## 2018-10-16 NOTE — TELEPHONE ENCOUNTER
1. Caller Name: Alysa                                         Call Back Number: [phone] (home)       Patient approves a detailed voicemail message: no    Pt is requesting a sleep aide. Pt does not want ambien. Please advise.

## 2018-10-17 NOTE — PROGRESS NOTES
CHIEF COMPLAINT  Chief Complaint   Patient presents with   • Follow-Up     Labs   HTN    HPI  Patient is a 68 y.o. female patient who presents today for the following     Hypertension, hyponatremia  Meds: Losartan 100 mg daily, taking as prescribed.    Measuring BP at home: no  No headaches, vision problems, tinnitus.  No chest pain/pressure, palpitations, irregular heart beats, exertional dyspnea, peripheral edema.  Diet: healthy Low salt diet: no  Exercise: yes  BMI: 20   Reviewed renal panel, showing hyponatremia worse than last time  -She does not have sodium/salt intake.     Hydronephrosis, right  Incidental finding on US abdomen on 5/16/2016, improving.    She was evaluated by urology, recommended ultrasound follow-up once in a year              - The last ultrasound was in 10/17     Denies flank pain, hematuria, change in urinary habits.       Liver cyst, benign, gallbladder polyp  She has h/o liver cyst/gallbladder polyp 4 m, with the last US abdomen on 10/17.    Denies RUQ pain.   MRI abdomen in 7/17.               - No need to f/u MRI further.      Leukopenia  The patient was found to have borderline leukopenia/neutropenia, with a decreased neutrophil count.    She has not had frequent infections/LAD.    Reviewed PMH, PSH, FH, SH, ALL, HCM/IMM, no changes  Reviewed MEDS, no changes    Patient Active Problem List    Diagnosis Date Noted   • Hydronephrosis of right kidney 05/13/2016     Priority: Medium   • Benign liver cyst 07/24/2017   • Uterine leiomyoma 01/03/2017   • Essential hypertension 09/03/2015     CURRENT MEDICATIONS  Current Outpatient Prescriptions   Medication Sig Dispense Refill   • Ibandronate Sodium (BONIVA PO) Take  by mouth.     • losartan (COZAAR) 100 MG Tab Take 1 Tab by mouth every day. 90 Tab 3   • LUTEIN PO Take  by mouth.     • multivitamin (THERAGRAN) Tab Take 1 Tab by mouth every day.       No current facility-administered medications for this visit.      ALLERGIES  Allergies:  Diltiazem  PAST MEDICAL HISTORY  Past Medical History:   Diagnosis Date   • Hydronephrosis    • Hypertension    • Hyponatremia    • Leukopenia    • Liver cyst     benign, no f/u   • Small bowel obstruction (HCC)     R   • Varicose vein of leg      SURGICAL HISTORY  She  has a past surgical history that includes breast biopsy (2007); colon resection laparoscopic (3/27/2014); exploratory laparotomy (5/3/2016); cystoscopy stent placement (Right, 5/13/2016); retrogrades (5/13/2016); exploratory laparotomy (12/14/2016); and lumpectomy (Left, 2005).  SOCIAL HISTORY  Social History   Substance Use Topics   • Smoking status: Never Smoker   • Smokeless tobacco: Never Used   • Alcohol use 4.2 oz/week     7 Standard drinks or equivalent per week      Comment: 4 per week     Social History     Social History Narrative    Lives with .     Children: 1    Work: teacher in [employer]     FAMILY HISTORY  Family History   Problem Relation Age of Onset   • Hyperlipidemia Mother    • Hypertension Mother    • Heart Disease Father    • Cancer Brother         neck   • Alcohol/Drug Neg Hx    • Allergies Neg Hx    • Diabetes Neg Hx    • Psychiatry Neg Hx    • Stroke Neg Hx    • Thyroid Neg Hx      Family Status   Relation Status   • Mo Alive   • Fa Alive   • Bro (Not Specified)   • Neg Hx (Not Specified)       ROS   Constitutional: Negative for fever, chills.  HENT: Negative for congestion, sore throat.  Eyes: Negative for blurred vision.   Respiratory: Negative for cough, shortness of breath.  Cardiovascular: Negative for chest pain, palpitations. And per HPI.  Gastrointestinal: Negative for heartburn, nausea, abdominal pain. And per HPI.  Genitourinary: Negative for dysuria. And per HPI.  Musculoskeletal: Negative for significant myalgias, back pain and joint pain.   Skin: Negative for rash and itching.   Neuro: Negative for dizziness, weakness and headaches.   Endo/Heme/Allergies: Does not bruise/bleed  "easily.   Psychiatric/Behavioral: Negative for depression.    PHYSICAL EXAM   Blood pressure 116/70, pulse 80, temperature 37.3 °C (99.1 °F), temperature source Temporal, resp. rate 14, height 1.575 m (5' 2\"), weight 50.8 kg (111 lb 15.9 oz), SpO2 100 %, not currently breastfeeding. Body mass index is 20.48 kg/m².  General:  NAD, well appearing  HEENT:   NC/AT, PERRLA, EOMI, TMs are clear. Oropharyngeal mucosa is pink,  without lesions;  no cervical / supraclavicular  lymphadenopathy, no thyromegaly.    Cardiovascular: RRR.   No m/r/g. No carotid bruits .      Lungs:   CTAB, no w/r/r, no respiratory distress.  Abdomen: Soft, NT/ND + BS; no suprapubic tenderness; no masses or hepatosplenomegaly.  Extremities:  2+ DP and radial pulses bilaterally.  No c/c/e.   Skin:  Warm, dry.  No erythema. No rash.   Neurologic: Alert & oriented x 3. No focal deficits.  Psychiatric:  Affect normal, mood normal, judgment normal.    LABS     Labs are reviewed and discussed with a patient  Lab Results   Component Value Date/Time    CHOLSTRLTOT 198 09/15/2018 08:46 AM     (H) 09/15/2018 08:46 AM    HDL 74 09/15/2018 08:46 AM    TRIGLYCERIDE 66 09/15/2018 08:46 AM       Lab Results   Component Value Date/Time    SODIUM 129 (L) 09/15/2018 08:46 AM    POTASSIUM 4.2 09/15/2018 08:46 AM    CHLORIDE 96 09/15/2018 08:46 AM    CO2 28 09/15/2018 08:46 AM    GLUCOSE 94 09/15/2018 08:46 AM    BUN 8 09/15/2018 08:46 AM    CREATININE 0.54 09/15/2018 08:46 AM    CREATININE 0.6 05/02/2007 09:18 AM     Lab Results   Component Value Date/Time    ALKPHOSPHAT 71 09/15/2018 08:46 AM    ASTSGOT 26 09/15/2018 08:46 AM    ALTSGPT 16 09/15/2018 08:46 AM    TBILIRUBIN 1.1 09/15/2018 08:46 AM     Lab Results   Component Value Date/Time    WBC 3.5 (L) 09/15/2018 08:25 AM    RBC 4.71 09/15/2018 08:25 AM    HEMOGLOBIN 14.1 09/15/2018 08:25 AM    HEMATOCRIT 41.7 09/15/2018 08:25 AM    MCV 88.5 09/15/2018 08:25 AM    MCH 29.9 09/15/2018 08:25 AM    MCHC 33.8 " 09/15/2018 08:25 AM    MPV 9.2 09/15/2018 08:25 AM    NEUTSPOLYS 55.50 09/15/2018 08:25 AM    LYMPHOCYTES 31.90 09/15/2018 08:25 AM    MONOCYTES 9.20 09/15/2018 08:25 AM    EOSINOPHILS 1.70 09/15/2018 08:25 AM    BASOPHILS 1.40 09/15/2018 08:25 AM    ANISOCYTOSIS 1+ 05/05/2016 04:10 AM      IMAGING     None    ASSESMENT AND PLAN        1. Essential hypertension  Controlled, continue current treatment  - COMP METABOLIC PANEL; Future  - COMP METABOLIC PANEL; Future    2. Hyponatremia  Advised to increase salt intake, follow-up labs in 3 and 6 months.  - COMP METABOLIC PANEL; Future  - COMP METABOLIC PANEL; Future    3. Hydronephrosis of right kidney  Follow-up imaging  - US-ABDOMEN COMPLETE SURVEY; Future    4. Benign liver cyst  Benign, follow-up imaging  - US-ABDOMEN COMPLETE SURVEY; Future    5. Gallbladder polyp  Small, follow-up imaging  - US-ABDOMEN COMPLETE SURVEY; Future    6. Leukopenia, unspecified type  Mild, follow-up labs in 3 and 6 months  - CBC WITH DIFFERENTIAL; Future  - CBC WITH DIFFERENTIAL; Future    7. Need for influenza vaccination  - INFLUENZA VACCINE, HIGH DOSE (65+ ONLY)  Information was provided to the patient regarding the vaccine, including side effects. Vaccine was given by my medical assistant under my supervision.    8. Menopause  - DS-BONE DENSITY STUDY (DEXA); Future    Counseling:   - Smoking:  Nonsmoker    Followup: Return in about 4 months (around 2/16/2019) for Short.    All questions are answered.    Please note that this dictation was created using voice recognition software, and that there might be errors of ashok and possibly content.

## 2018-10-18 NOTE — TELEPHONE ENCOUNTER
Phone Number Called: [phone] (home)     Message:  Called pt and Lm regarding sleep aid    Left Message for patient to call back: yes

## 2018-11-03 ENCOUNTER — HOSPITAL ENCOUNTER (OUTPATIENT)
Dept: LAB | Facility: MEDICAL CENTER | Age: 68
End: 2018-11-03
Attending: INTERNAL MEDICINE
Payer: COMMERCIAL

## 2018-11-03 DIAGNOSIS — I10 ESSENTIAL HYPERTENSION: ICD-10-CM

## 2018-11-03 DIAGNOSIS — E87.1 HYPONATREMIA: ICD-10-CM

## 2018-11-03 DIAGNOSIS — D72.819 LEUKOPENIA, UNSPECIFIED TYPE: ICD-10-CM

## 2018-11-03 LAB
ALBUMIN SERPL BCP-MCNC: 3.8 G/DL (ref 3.2–4.9)
ALBUMIN/GLOB SERPL: 1.5 G/DL
ALP SERPL-CCNC: 69 U/L (ref 30–99)
ALT SERPL-CCNC: 15 U/L (ref 2–50)
ANION GAP SERPL CALC-SCNC: 5 MMOL/L (ref 0–11.9)
AST SERPL-CCNC: 24 U/L (ref 12–45)
BASOPHILS # BLD AUTO: 1.2 % (ref 0–1.8)
BASOPHILS # BLD: 0.05 K/UL (ref 0–0.12)
BILIRUB SERPL-MCNC: 0.5 MG/DL (ref 0.1–1.5)
BUN SERPL-MCNC: 16 MG/DL (ref 8–22)
CALCIUM SERPL-MCNC: 8.9 MG/DL (ref 8.5–10.5)
CHLORIDE SERPL-SCNC: 103 MMOL/L (ref 96–112)
CO2 SERPL-SCNC: 27 MMOL/L (ref 20–33)
CREAT SERPL-MCNC: 0.56 MG/DL (ref 0.5–1.4)
EOSINOPHIL # BLD AUTO: 0.03 K/UL (ref 0–0.51)
EOSINOPHIL NFR BLD: 0.7 % (ref 0–6.9)
ERYTHROCYTE [DISTWIDTH] IN BLOOD BY AUTOMATED COUNT: 46.1 FL (ref 35.9–50)
FASTING STATUS PATIENT QL REPORTED: NORMAL
GLOBULIN SER CALC-MCNC: 2.6 G/DL (ref 1.9–3.5)
GLUCOSE SERPL-MCNC: 93 MG/DL (ref 65–99)
HCT VFR BLD AUTO: 37.3 % (ref 37–47)
HGB BLD-MCNC: 12.1 G/DL (ref 12–16)
IMM GRANULOCYTES # BLD AUTO: 0 K/UL (ref 0–0.11)
IMM GRANULOCYTES NFR BLD AUTO: 0 % (ref 0–0.9)
LYMPHOCYTES # BLD AUTO: 0.87 K/UL (ref 1–4.8)
LYMPHOCYTES NFR BLD: 21.4 % (ref 22–41)
MCH RBC QN AUTO: 29.9 PG (ref 27–33)
MCHC RBC AUTO-ENTMCNC: 32.4 G/DL (ref 33.6–35)
MCV RBC AUTO: 92.1 FL (ref 81.4–97.8)
MONOCYTES # BLD AUTO: 0.3 K/UL (ref 0–0.85)
MONOCYTES NFR BLD AUTO: 7.4 % (ref 0–13.4)
NEUTROPHILS # BLD AUTO: 2.82 K/UL (ref 2–7.15)
NEUTROPHILS NFR BLD: 69.3 % (ref 44–72)
NRBC # BLD AUTO: 0 K/UL
NRBC BLD-RTO: 0 /100 WBC
PLATELET # BLD AUTO: 351 K/UL (ref 164–446)
PMV BLD AUTO: 9.3 FL (ref 9–12.9)
POTASSIUM SERPL-SCNC: 4.1 MMOL/L (ref 3.6–5.5)
PROT SERPL-MCNC: 6.4 G/DL (ref 6–8.2)
RBC # BLD AUTO: 4.05 M/UL (ref 4.2–5.4)
SODIUM SERPL-SCNC: 135 MMOL/L (ref 135–145)
WBC # BLD AUTO: 4.1 K/UL (ref 4.8–10.8)

## 2018-11-03 PROCEDURE — 80053 COMPREHEN METABOLIC PANEL: CPT

## 2018-11-03 PROCEDURE — 85025 COMPLETE CBC W/AUTO DIFF WBC: CPT

## 2018-11-03 PROCEDURE — 36415 COLL VENOUS BLD VENIPUNCTURE: CPT

## 2018-11-17 ENCOUNTER — HOSPITAL ENCOUNTER (OUTPATIENT)
Dept: RADIOLOGY | Facility: MEDICAL CENTER | Age: 68
End: 2018-11-17
Attending: INTERNAL MEDICINE
Payer: COMMERCIAL

## 2018-11-17 DIAGNOSIS — N13.30 HYDRONEPHROSIS OF RIGHT KIDNEY: ICD-10-CM

## 2018-11-17 DIAGNOSIS — K82.4 GALLBLADDER POLYP: ICD-10-CM

## 2018-11-17 DIAGNOSIS — K76.89 BENIGN LIVER CYST: ICD-10-CM

## 2018-11-17 PROCEDURE — 76700 US EXAM ABDOM COMPLETE: CPT

## 2018-12-17 ENCOUNTER — APPOINTMENT (OUTPATIENT)
Dept: PULMONOLOGY | Facility: HOSPICE | Age: 68
End: 2018-12-17

## 2019-01-29 ENCOUNTER — APPOINTMENT (OUTPATIENT)
Dept: PULMONOLOGY | Facility: HOSPICE | Age: 69
End: 2019-01-29

## 2019-04-13 ENCOUNTER — TELEPHONE (OUTPATIENT)
Dept: PULMONOLOGY | Facility: HOSPICE | Age: 69
End: 2019-04-13

## 2019-04-13 NOTE — TELEPHONE ENCOUNTER
4/12 pt called [phone] and cancelled,would like a call back to reschedule please and thank you DO   Was unsure who to send this message to.    Thank you,  Milly with Patient outreach

## 2019-04-15 ENCOUNTER — APPOINTMENT (OUTPATIENT)
Dept: PULMONOLOGY | Facility: HOSPICE | Age: 69
End: 2019-04-15

## 2019-04-25 ENCOUNTER — OFFICE VISIT (OUTPATIENT)
Dept: MEDICAL GROUP | Facility: MEDICAL CENTER | Age: 69
End: 2019-04-25
Payer: COMMERCIAL

## 2019-04-25 VITALS
WEIGHT: 110.89 LBS | SYSTOLIC BLOOD PRESSURE: 138 MMHG | DIASTOLIC BLOOD PRESSURE: 82 MMHG | OXYGEN SATURATION: 96 % | HEART RATE: 70 BPM | HEIGHT: 62 IN | BODY MASS INDEX: 20.41 KG/M2 | TEMPERATURE: 98.6 F

## 2019-04-25 DIAGNOSIS — K76.89 BENIGN LIVER CYST: ICD-10-CM

## 2019-04-25 DIAGNOSIS — B35.1 ONYCHOMYCOSIS: ICD-10-CM

## 2019-04-25 DIAGNOSIS — E78.00 HYPERCHOLESTEROLEMIA: ICD-10-CM

## 2019-04-25 DIAGNOSIS — N13.30 HYDRONEPHROSIS OF RIGHT KIDNEY: ICD-10-CM

## 2019-04-25 DIAGNOSIS — K82.4 GALLBLADDER POLYP: ICD-10-CM

## 2019-04-25 DIAGNOSIS — I10 ESSENTIAL HYPERTENSION: ICD-10-CM

## 2019-04-25 DIAGNOSIS — Z00.00 HEALTH CARE MAINTENANCE: ICD-10-CM

## 2019-04-25 DIAGNOSIS — F41.9 ANXIETY: ICD-10-CM

## 2019-04-25 DIAGNOSIS — D72.819 LEUKOPENIA, UNSPECIFIED TYPE: ICD-10-CM

## 2019-04-25 PROCEDURE — 99214 OFFICE O/P EST MOD 30 MIN: CPT | Performed by: INTERNAL MEDICINE

## 2019-04-25 RX ORDER — PRENATAL VIT 91/IRON/FOLIC/DHA 28-975-200
COMBINATION PACKAGE (EA) ORAL
Qty: 15 G | Refills: 0 | Status: SHIPPED | OUTPATIENT
Start: 2019-04-25 | End: 2019-11-04

## 2019-04-25 RX ORDER — LORAZEPAM 0.5 MG/1
0.5 TABLET ORAL EVERY 4 HOURS PRN
Qty: 30 TAB | Refills: 0 | Status: SHIPPED | OUTPATIENT
Start: 2019-04-25 | End: 2019-05-25

## 2019-04-25 RX ORDER — LOSARTAN POTASSIUM 100 MG/1
100 TABLET ORAL DAILY
Qty: 90 TAB | Refills: 3 | Status: SHIPPED | OUTPATIENT
Start: 2019-04-25 | End: 2019-11-04 | Stop reason: SDUPTHER

## 2019-04-25 ASSESSMENT — PATIENT HEALTH QUESTIONNAIRE - PHQ9: CLINICAL INTERPRETATION OF PHQ2 SCORE: 0

## 2019-04-25 NOTE — PROGRESS NOTES
CHIEF COMPLAINT  Chief Complaint   Patient presents with   • Hypertension     HPI  Patient is a 68 y.o. female patient who presents today for the following     Hypertension  Meds: Losartan 100 mg daily, taking as prescribed.    Measuring BP at home: no  No headaches, vision problems, tinnitus.  No chest pain/pressure, palpitations, irregular heart beats, exertional dyspnea, peripheral edema.  Diet: healthy Low salt diet: no  Exercise: yes  BMI: 20   Reviewed renal panel.     Hydronephrosis, right  Incidental finding on US abdomen on 5/16/2016, improving.    She was evaluated by urology, recommended ultrasound follow-up once in a year              - The last ultrasound was in 10/17     Denies flank pain, hematuria, change in urinary habits.       Liver cyst, benign, gallbladder polyp  She has h/o liver cyst/gallbladder polyp 4 m, with the last US abdomen on 10/17.    Denies RUQ pain.   MRI abdomen in 7/17.               - No need to f/u MRI further.      Leukopenia  The patient was found to have borderline leukopenia/neutropenia, with a decreased neutrophil count.    She has not had frequent infections/LAD    Anxiety  The patient complains of intermittent anxiety, which she used occasionally lorazepam, noted daily;  -The last refill of 10 tablets was 6 months ago    Onychomycosis of the right great toenail  Complains of white/yellow discoloration thickening of the right great toenail for the last 1 to 2 years, gradually worsening.  She did not use any medication.    Hypercholesterolemia  The patient has borderline high LDL, with normal other lipid indices.  Diet / Exercise/BMI:  As above  FH: Unknown    Reviewed PMH, PSH, FH, SH, ALL, HCM/IMM, no changes  Reviewed MEDS, no changes    Patient Active Problem List    Diagnosis Date Noted   • Hydronephrosis of right kidney 05/13/2016     Priority: Medium   • Health care maintenance 04/25/2019   • Onychomycosis 04/25/2019   • Benign liver cyst 07/24/2017   • Uterine  leiomyoma 01/03/2017   • Essential hypertension 09/03/2015     CURRENT MEDICATIONS  Current Outpatient Prescriptions   Medication Sig Dispense Refill   • losartan (COZAAR) 100 MG Tab Take 1 Tab by mouth every day. 90 Tab 3   • terbinafine (LAMISIL) 1 % cream Apply BID over fungal infection 15 g 0   • LORazepam (ATIVAN) 0.5 MG Tab Take 1 Tab by mouth every four hours as needed for Anxiety for up to 30 days. 30 Tab 0   • Ibandronate Sodium (BONIVA PO) Take  by mouth.     • LUTEIN PO Take  by mouth.     • multivitamin (THERAGRAN) Tab Take 1 Tab by mouth every day.       No current facility-administered medications for this visit.      ALLERGIES  Allergies: Diltiazem  PAST MEDICAL HISTORY  Past Medical History:   Diagnosis Date   • Hydronephrosis    • Hypertension    • Hyponatremia    • Leukopenia    • Liver cyst     benign, no f/u   • Small bowel obstruction (HCC)     R   • Varicose vein of leg      SURGICAL HISTORY  She  has a past surgical history that includes breast biopsy (2007); colon resection laparoscopic (3/27/2014); exploratory laparotomy (5/3/2016); cystoscopy stent placement (Right, 5/13/2016); retrogrades (5/13/2016); exploratory laparotomy (12/14/2016); and lumpectomy (Left, 2005).  SOCIAL HISTORY  Social History   Substance Use Topics   • Smoking status: Never Smoker   • Smokeless tobacco: Never Used   • Alcohol use 4.2 oz/week     7 Standard drinks or equivalent per week      Comment: 4 per week     Social History     Social History Narrative    Lives with .     Children: 1    Work: teacher in [employer]     FAMILY HISTORY  Family History   Problem Relation Age of Onset   • Hyperlipidemia Mother    • Hypertension Mother    • Heart Disease Father    • Cancer Brother         neck   • Alcohol/Drug Neg Hx    • Allergies Neg Hx    • Diabetes Neg Hx    • Psychiatry Neg Hx    • Stroke Neg Hx    • Thyroid Neg Hx      Family Status   Relation Status   • Mo Alive   • Fa Alive   •  "Bro (Not Specified)   • Neg Hx (Not Specified)       ROS   Constitutional: Negative for fever, chills, fatigue.  HENT: Negative for sore throat.  Eyes: Negative for vision problems.   Respiratory: Negative for cough, shortness of breath.  Cardiovascular: Negative for chest pain, palpitations.   Gastrointestinal: Negative for abdominal pain.  Complains of intermittent heartburn.  Genitourinary: Negative for urinary problems.  Musculoskeletal: Negative for back pain.  Skin: Negative for rash.   Neuro: Negative for dizziness,  headaches.   Endo/Heme/Allergies: Does not bruise/bleed easily.   Psychiatric/Behavioral: As above..    PHYSICAL EXAM   /82 (BP Location: Left arm, Patient Position: Sitting, BP Cuff Size: Adult)   Pulse 70   Temp 37 °C (98.6 °F) (Temporal)   Ht 1.575 m (5' 2\")   Wt 50.3 kg (110 lb 14.3 oz)   SpO2 96%  Body mass index is 20.28 kg/m².  General:  NAD, well appearing  HEENT:   NC/AT, PERRLA, EOMI, TMs are clear. Oropharyngeal mucosa is pink,  without lesions;  no cervical / supraclavicular  lymphadenopathy, no thyromegaly.    Cardiovascular: RRR.   No m/r/g. No carotid bruits .      Lungs:   CTAB, no w/r/r, no respiratory distress.  Abdomen: Soft, NT/ND + BS; no hepatosplenomegaly.  Extremities:  2+ DP and radial pulses bilaterally.  No c/c/e.   Skin:  Warm, dry.  No erythema. No rash.   Neurologic: Alert & oriented x 3. CN II-XII grossly intact. Brachioradialis / knee DTR are 2/4, symmetric. Strength and sensation grossly intact.  No focal deficits.  Psychiatric:  Affect normal, mood normal, judgment normal.    LABS     Labs are reviewed and discussed with a patient  Lab Results   Component Value Date/Time    CHOLSTRLTOT 198 09/15/2018 08:46 AM     (H) 09/15/2018 08:46 AM    HDL 74 09/15/2018 08:46 AM    TRIGLYCERIDE 66 09/15/2018 08:46 AM       Lab Results   Component Value Date/Time    SODIUM 135 11/03/2018 08:00 AM    POTASSIUM 4.1 11/03/2018 08:00 AM    CHLORIDE 103 11/03/2018 " 08:00 AM    CO2 27 11/03/2018 08:00 AM    GLUCOSE 93 11/03/2018 08:00 AM    BUN 16 11/03/2018 08:00 AM    CREATININE 0.56 11/03/2018 08:00 AM    CREATININE 0.6 05/02/2007 09:18 AM     Lab Results   Component Value Date/Time    ALKPHOSPHAT 69 11/03/2018 08:00 AM    ASTSGOT 24 11/03/2018 08:00 AM    ALTSGPT 15 11/03/2018 08:00 AM    TBILIRUBIN 0.5 11/03/2018 08:00 AM      No results found for: HBA1C  No results found for: TSH  No results found for: FREET4    Lab Results   Component Value Date/Time    WBC 4.1 (L) 11/03/2018 08:00 AM    RBC 4.05 (L) 11/03/2018 08:00 AM    HEMOGLOBIN 12.1 11/03/2018 08:00 AM    HEMATOCRIT 37.3 11/03/2018 08:00 AM    MCV 92.1 11/03/2018 08:00 AM    MCH 29.9 11/03/2018 08:00 AM    MCHC 32.4 (L) 11/03/2018 08:00 AM    MPV 9.3 11/03/2018 08:00 AM    NEUTSPOLYS 69.30 11/03/2018 08:00 AM    LYMPHOCYTES 21.40 (L) 11/03/2018 08:00 AM    MONOCYTES 7.40 11/03/2018 08:00 AM    EOSINOPHILS 0.70 11/03/2018 08:00 AM    BASOPHILS 1.20 11/03/2018 08:00 AM    ANISOCYTOSIS 1+ 05/05/2016 04:10 AM      IMAGING     Reviewed the last abdominal ultrasound from 11/17/2018:  Diminished size of gallbladder polyp  Mild increase in size of 15 mm left hepatic simple cyst  Stable prominent right extrarenal pelvis    ASSESMENT AND PLAN        1. Essential hypertension  Controlled, continue current treatment  - losartan (COZAAR) 100 MG Tab; Take 1 Tab by mouth every day.  Dispense: 90 Tab; Refill: 3  - Comp Metabolic Panel; Future  - Comp Metabolic Panel; Future    2. Hydronephrosis of right kidney  Improved, will be followed up    3. Benign liver cyst  Benign    4. Gallbladder polyp  Improved    5. Leukopenia, unspecified type  Stable, follow-up legs  - CBC WITH DIFFERENTIAL; Future    6. Anxiety  Stable, occasional use of lorazepam, not daily  - LORazepam (ATIVAN) 0.5 MG Tab; Take 1 Tab by mouth every four hours as needed for Anxiety for up to 30 days.  Dispense: 30 Tab; Refill: 0    Obtained and reviewed patient  utilization report from [facility] pharmacy database on 4/25/2019 4:53 PM  prior to writing prescription for controlled substance II, III or IV per [place] bill . Based on assessment of the report, the prescription is medically necessary.     7. Onychomycosis  May hear a trial:  - terbinafine (LAMISIL) 1 % cream; Apply BID over fungal infection  Dispense: 15 g; Refill: 0    8.  Hypercholesterolemia  - Lipid panel, Future    9. Health care maintenance  Pending records from bone density, done outside of [facility]      Counseling:   - Smoking:  Nonsmoker    Followup: Return in about 6 months (around 10/25/2019).    All questions are answered.    Please note that this dictation was created using voice recognition software, and that there might be errors of ashok and possibly content.

## 2019-07-16 ENCOUNTER — OFFICE VISIT (OUTPATIENT)
Dept: PULMONOLOGY | Facility: HOSPICE | Age: 69
End: 2019-07-16
Payer: COMMERCIAL

## 2019-07-16 VITALS
BODY MASS INDEX: 21.02 KG/M2 | SYSTOLIC BLOOD PRESSURE: 134 MMHG | DIASTOLIC BLOOD PRESSURE: 88 MMHG | HEIGHT: 62 IN | WEIGHT: 114.25 LBS | OXYGEN SATURATION: 98 % | HEART RATE: 72 BPM

## 2019-07-16 DIAGNOSIS — R91.8 PULMONARY NODULES: ICD-10-CM

## 2019-07-16 PROCEDURE — 99213 OFFICE O/P EST LOW 20 MIN: CPT | Performed by: INTERNAL MEDICINE

## 2019-07-16 ASSESSMENT — ENCOUNTER SYMPTOMS
MUSCULOSKELETAL NEGATIVE: 1
CONSTITUTIONAL NEGATIVE: 1
GASTROINTESTINAL NEGATIVE: 1
CARDIOVASCULAR NEGATIVE: 1
EYES NEGATIVE: 1
NEUROLOGICAL NEGATIVE: 1
PSYCHIATRIC NEGATIVE: 1
RESPIRATORY NEGATIVE: 1

## 2019-07-16 NOTE — PROGRESS NOTES
Pulmonary Clinic follow up    Date of Service: 7/16/2019    Reason for follow up:  Follow-Up (Incident lung Nocule. Last seen 06/14/17) and Results (05/26/18)      Problem List Items Addressed This Visit     Pulmonary nodules     Pt with CT last in 5/2018 2 nodules 5 mm in the rll and 4 mm in ll  Non smoker  Low risk  Repeat CT scan if no change in nodules then benign and no further follow up  Reviewed with patient who is in agreement with the plan         Relevant Orders    CT-CHEST (THORAX) W/O            History of Present Illness: Alysa Sarabia is a 69 y.o. female with a past medical history of  HTN and following up for pulmonary nodules  Pt last seen in pulmonary clinic in 2017    CT scan done on 5/26/18 personally viewed  2 pulm nodules  5 mm and 4 mm RLL and LLL    Review of Systems   Constitutional: Negative.    HENT: Negative.    Eyes: Negative.    Respiratory: Negative.    Cardiovascular: Negative.    Gastrointestinal: Negative.    Genitourinary: Negative.    Musculoskeletal: Negative.    Skin: Negative.    Neurological: Negative.    Endo/Heme/Allergies: Negative.    Psychiatric/Behavioral: Negative.        Current Outpatient Prescriptions on File Prior to Visit   Medication Sig Dispense Refill   • losartan (COZAAR) 100 MG Tab Take 1 Tab by mouth every day. 90 Tab 3   • Ibandronate Sodium (BONIVA PO) Take  by mouth Q30 DAYS.     • LUTEIN PO Take  by mouth 2 Times a Day.     • multivitamin (THERAGRAN) Tab Take 1 Tab by mouth every day.     • terbinafine (LAMISIL) 1 % cream Apply BID over fungal infection (Patient not taking: Reported on 7/16/2019) 15 g 0     No current facility-administered medications on file prior to visit.        Social History   Substance Use Topics   • Smoking status: Never Smoker   • Smokeless tobacco: Never Used   • Alcohol use 7.2 oz/week     7 Standard drinks or equivalent, 5 Glasses of wine per week      Comment: 4 per week        Past Medical History:   Diagnosis Date  "  • Hydronephrosis    • Hypertension    • Hyponatremia    • Leukopenia    • Liver cyst     benign, no f/u   • Small bowel obstruction (HCC)     R   • Varicose vein of leg        Past Surgical History:   Procedure Laterality Date   • EXPLORATORY LAPAROTOMY  12/14/2016    Procedure: EXPLORATORY LAPAROTOMY, EXTENSIVE ADHESIOLYSIS, SMALL BOWEL ANASTOMOSIS;  Surgeon: Mac Reddy M.D.;  Location: [facility];  Service:    • CYSTOSCOPY STENT PLACEMENT Right 5/13/2016    Procedure: CYSTOSCOPY STENT PLACEMENT;  Surgeon: Fabrizio Sun M.D.;  Location: [facility];  Service:    • RETROGRADES  5/13/2016    Procedure: RETROGRADES;  Surgeon: Fabrizio Sun M.D.;  Location: [facility];  Service:    • EXPLORATORY LAPAROTOMY  5/3/2016    Procedure: EXPLORATORY LAPAROTOMY- Bowel obstruction;  Surgeon: Mac Reddy M.D.;  Location: [facility];  Service:    • COLON RESECTION LAPAROSCOPIC  3/27/2014    Performed by Mac Reddy M.D. at [facility]   • BREAST BIOPSY  2007   • LUMPECTOMY Left 2005    benign       Allergies: Diltiazem    Family History   Problem Relation Age of Onset   • Hyperlipidemia Mother    • Hypertension Mother    • Heart Disease Father    • Cancer Brother         neck   • Alcohol/Drug Neg Hx    • Allergies Neg Hx    • Diabetes Neg Hx    • Psychiatry Neg Hx    • Stroke Neg Hx    • Thyroid Neg Hx        Vitals:    07/16/19 1517   Height: 1.575 m (5' 2\")   Weight: 51.8 kg (114 lb 4 oz)   Weight % change since last entry.: 0 %   BP: 134/88   Pulse: 72   BMI (Calculated): 20.9   O2 sat % room air: 98 %       Physical Examination  Physical Exam   Constitutional: She is oriented to person, place, and time. No distress.   HENT:   Head: Normocephalic and atraumatic.   Right Ear: External ear normal.   Left Ear: External ear normal.   Mouth/Throat: Oropharynx is clear and moist.   Eyes: Pupils are equal, round, and reactive to light. Conjunctivae and EOM " are normal.   Neck: Normal range of motion. Neck supple. No JVD present. No tracheal deviation present. No thyromegaly present.   Cardiovascular: Normal rate, regular rhythm and intact distal pulses.  Exam reveals no gallop and no friction rub.    No murmur heard.  Pulmonary/Chest: No stridor. No respiratory distress. She has no wheezes. She has no rales. She exhibits no tenderness.   Abdominal: Soft. Bowel sounds are normal.   Musculoskeletal: She exhibits no edema, tenderness or deformity.   Lymphadenopathy:     She has no cervical adenopathy.   Neurological: She is alert and oriented to person, place, and time. No cranial nerve deficit. Gait normal. Coordination normal. GCS score is 15.   Skin: Skin is warm and dry. No rash noted. She is not diaphoretic.   Psychiatric: Mood, affect and judgment normal.       Patrick Persaud MD MPH [facility]  [facility]

## 2019-07-16 NOTE — ASSESSMENT & PLAN NOTE
Pt with CT last in 5/2018 2 nodules 5 mm in the rll and 4 mm in ll  Non smoker  Low risk  Repeat CT scan if no change in nodules then benign and no further follow up  Reviewed with patient who is in agreement with the plan

## 2019-07-24 ENCOUNTER — HOSPITAL ENCOUNTER (OUTPATIENT)
Dept: RADIOLOGY | Facility: MEDICAL CENTER | Age: 69
End: 2019-07-24
Attending: INTERNAL MEDICINE
Payer: COMMERCIAL

## 2019-07-24 DIAGNOSIS — R91.8 PULMONARY NODULES: ICD-10-CM

## 2019-07-24 PROCEDURE — 71250 CT THORAX DX C-: CPT

## 2019-09-03 ENCOUNTER — OFFICE VISIT (OUTPATIENT)
Dept: PULMONOLOGY | Facility: HOSPICE | Age: 69
End: 2019-09-03
Payer: COMMERCIAL

## 2019-09-03 VITALS
HEART RATE: 70 BPM | WEIGHT: 116 LBS | HEIGHT: 62 IN | DIASTOLIC BLOOD PRESSURE: 70 MMHG | BODY MASS INDEX: 21.35 KG/M2 | SYSTOLIC BLOOD PRESSURE: 132 MMHG | OXYGEN SATURATION: 98 %

## 2019-09-03 DIAGNOSIS — R91.8 PULMONARY NODULES: ICD-10-CM

## 2019-09-03 PROCEDURE — 99212 OFFICE O/P EST SF 10 MIN: CPT | Performed by: INTERNAL MEDICINE

## 2019-09-03 ASSESSMENT — ENCOUNTER SYMPTOMS
NEUROLOGICAL NEGATIVE: 1
CONSTITUTIONAL NEGATIVE: 1
PSYCHIATRIC NEGATIVE: 1
CARDIOVASCULAR NEGATIVE: 1
EYES NEGATIVE: 1
RESPIRATORY NEGATIVE: 1
MUSCULOSKELETAL NEGATIVE: 1
GASTROINTESTINAL NEGATIVE: 1

## 2019-09-03 NOTE — PROGRESS NOTES
Pulmonary Clinic follow up    Date of Service: 9/3/2019    Reason for follow up:  Follow-Up (Last seen on 07/16/2019) and Results (CT-Chest w/o 07/24/2019)      Problem List Items Addressed This Visit     Pulmonary nodules     2 pulmonary nodules that are stable and unchanged since 2016  Thus, benign  Per Fleishner guidelines no further followup             Immunization History   Administered Date(s) Administered   • Influenza Seasonal Injectable 10/07/2011, 11/02/2012, 03/27/2014, 09/26/2017   • Influenza TIV (IM) 11/02/2012, 03/27/2014, 09/26/2017   • Influenza Vaccine Adult HD 09/29/2017, 10/16/2018   • Influenza Vaccine Pediatric Split 10/07/2011, 11/02/2012, 03/27/2014, 09/26/2017   • Influenza Vaccine Quad Inj (Pf) 12/05/2016   • Pneumococcal Conjugate Vaccine (Prevnar/PCV-13) 09/29/2017   • Pneumococcal polysaccharide vaccine (PPSV-23) 12/15/2016   • Recombinant Zoster Vaccine (RZV) (Shingrix) 06/25/2019   • TD Vaccine 09/03/2008   • Tdap Vaccine 07/31/2014   • Zoster Vaccine Live (ZVL) (Zostavax) 11/05/2012           History of Present Illness: Alysa Sarabia is a 69 y.o. female with a past medical history of HTN and following up for pulmonary nodules  Pt last seen in pulmonary clinic in 7/16/19 and this is follow up of CT done 5/18 2 nodules 5 mm in the rll and 4 mm in ll   Pt is a nonsmoker  CT done on 7/24/19 personally viewed and no new nodules and unchanged pulm nodules from 5/2018  First Ct was in 2016    Review of Systems   Constitutional: Negative.    HENT: Negative.    Eyes: Negative.    Respiratory: Negative.    Cardiovascular: Negative.    Gastrointestinal: Negative.    Genitourinary: Negative.    Musculoskeletal: Negative.    Skin: Negative.    Neurological: Negative.    Endo/Heme/Allergies: Negative.    Psychiatric/Behavioral: Negative.        Current Outpatient Medications on File Prior to Visit   Medication Sig Dispense Refill   • CALCIUM PO Take  by mouth every day.     • losartan  (COZAAR) 100 MG Tab Take 1 Tab by mouth every day. 90 Tab 3   • terbinafine (LAMISIL) 1 % cream Apply BID over fungal infection 15 g 0   • Ibandronate Sodium (BONIVA PO) Take  by mouth Q30 DAYS.     • LUTEIN PO Take  by mouth 2 Times a Day.     • multivitamin (THERAGRAN) Tab Take 1 Tab by mouth every day.       No current facility-administered medications on file prior to visit.        Social History     Tobacco Use   • Smoking status: Never Smoker   • Smokeless tobacco: Never Used   Substance Use Topics   • Alcohol use: Yes     Alcohol/week: 7.2 oz     Types: 7 Standard drinks or equivalent, 5 Glasses of wine per week     Comment: 4 per week   • Drug use: No        Past Medical History:   Diagnosis Date   • Hydronephrosis    • Hypertension    • Hyponatremia    • Leukopenia    • Liver cyst     benign, no f/u   • Small bowel obstruction (HCC)     R   • Varicose vein of leg        Past Surgical History:   Procedure Laterality Date   • EXPLORATORY LAPAROTOMY  12/14/2016    Procedure: EXPLORATORY LAPAROTOMY, EXTENSIVE ADHESIOLYSIS, SMALL BOWEL ANASTOMOSIS;  Surgeon: Mac Reddy M.D.;  Location: [facility];  Service:    • CYSTOSCOPY STENT PLACEMENT Right 5/13/2016    Procedure: CYSTOSCOPY STENT PLACEMENT;  Surgeon: Fabrizio Sun M.D.;  Location: [facility];  Service:    • RETROGRADES  5/13/2016    Procedure: RETROGRADES;  Surgeon: Fabrizio Sun M.D.;  Location: [facility];  Service:    • EXPLORATORY LAPAROTOMY  5/3/2016    Procedure: EXPLORATORY LAPAROTOMY- Bowel obstruction;  Surgeon: Mac Reddy M.D.;  Location: [facility];  Service:    • COLON RESECTION LAPAROSCOPIC  3/27/2014    Performed by Mac Reddy M.D. at [facility]   • BREAST BIOPSY  2007   • LUMPECTOMY Left 2005    benign       Allergies: Diltiazem    Family History   Problem Relation Age of Onset   • Hyperlipidemia Mother    • Hypertension Mother    • Heart Disease Father    •  "Cancer Brother         neck   • Alcohol/Drug Neg Hx    • Allergies Neg Hx    • Diabetes Neg Hx    • Psychiatric Illness Neg Hx    • Stroke Neg Hx    • Thyroid Neg Hx        Vitals:    09/03/19 1558   Height: 1.575 m (5' 2\")   Weight: 52.6 kg (116 lb)   Weight % change since last entry.: 0 %   BP: 132/70   Pulse: 70   BMI (Calculated): 21.22       Physical Examination  Physical Exam   Constitutional: She is oriented to person, place, and time. No distress.   HENT:   Head: Normocephalic and atraumatic.   Right Ear: External ear normal.   Left Ear: External ear normal.   Mouth/Throat: Oropharynx is clear and moist.   Eyes: Pupils are equal, round, and reactive to light. Conjunctivae and EOM are normal.   Neck: Normal range of motion. Neck supple. No JVD present. No tracheal deviation present. No thyromegaly present.   Cardiovascular: Normal rate, regular rhythm and intact distal pulses. Exam reveals no gallop and no friction rub.   No murmur heard.  Pulmonary/Chest: No stridor. No respiratory distress. She has no wheezes. She has no rales. She exhibits no tenderness.   Abdominal: Soft. Bowel sounds are normal.   Musculoskeletal: She exhibits no edema, tenderness or deformity.   Lymphadenopathy:     She has no cervical adenopathy.   Neurological: She is alert and oriented to person, place, and time. No cranial nerve deficit. Gait normal. Coordination normal. GCS score is 15.   Skin: Skin is warm and dry. No rash noted. She is not diaphoretic.   Psychiatric: Mood, affect and judgment normal.       Patrick Persaud MD MPH [facility]  [facility]  "

## 2019-09-03 NOTE — ASSESSMENT & PLAN NOTE
2 pulmonary nodules that are stable and unchanged since 2016  Thus, benign  Per Fleishner guidelines no further followup

## 2019-10-03 ENCOUNTER — HOSPITAL ENCOUNTER (OUTPATIENT)
Dept: RADIOLOGY | Facility: MEDICAL CENTER | Age: 69
End: 2019-10-03
Attending: INTERNAL MEDICINE
Payer: COMMERCIAL

## 2019-10-03 DIAGNOSIS — Z12.31 VISIT FOR SCREENING MAMMOGRAM: ICD-10-CM

## 2019-10-03 PROCEDURE — 77063 BREAST TOMOSYNTHESIS BI: CPT

## 2019-10-30 ENCOUNTER — HOSPITAL ENCOUNTER (OUTPATIENT)
Dept: LAB | Facility: MEDICAL CENTER | Age: 69
End: 2019-10-30
Attending: INTERNAL MEDICINE
Payer: COMMERCIAL

## 2019-10-30 DIAGNOSIS — D72.819 LEUKOPENIA, UNSPECIFIED TYPE: ICD-10-CM

## 2019-10-30 DIAGNOSIS — E78.00 HYPERCHOLESTEROLEMIA: ICD-10-CM

## 2019-10-30 DIAGNOSIS — I10 ESSENTIAL HYPERTENSION: ICD-10-CM

## 2019-10-30 LAB
ALBUMIN SERPL BCP-MCNC: 4.5 G/DL (ref 3.2–4.9)
ALBUMIN/GLOB SERPL: 1.6 G/DL
ALP SERPL-CCNC: 70 U/L (ref 30–99)
ALT SERPL-CCNC: 17 U/L (ref 2–50)
ANION GAP SERPL CALC-SCNC: 6 MMOL/L (ref 0–11.9)
AST SERPL-CCNC: 24 U/L (ref 12–45)
BASOPHILS # BLD AUTO: 1.7 % (ref 0–1.8)
BASOPHILS # BLD: 0.05 K/UL (ref 0–0.12)
BILIRUB SERPL-MCNC: 1.1 MG/DL (ref 0.1–1.5)
BUN SERPL-MCNC: 10 MG/DL (ref 8–22)
CALCIUM SERPL-MCNC: 9 MG/DL (ref 8.5–10.5)
CHLORIDE SERPL-SCNC: 96 MMOL/L (ref 96–112)
CHOLEST SERPL-MCNC: 202 MG/DL (ref 100–199)
CO2 SERPL-SCNC: 27 MMOL/L (ref 20–33)
CREAT SERPL-MCNC: 0.57 MG/DL (ref 0.5–1.4)
EOSINOPHIL # BLD AUTO: 0.07 K/UL (ref 0–0.51)
EOSINOPHIL NFR BLD: 2.3 % (ref 0–6.9)
ERYTHROCYTE [DISTWIDTH] IN BLOOD BY AUTOMATED COUNT: 45.5 FL (ref 35.9–50)
GLOBULIN SER CALC-MCNC: 2.9 G/DL (ref 1.9–3.5)
GLUCOSE SERPL-MCNC: 91 MG/DL (ref 65–99)
HCT VFR BLD AUTO: 43.3 % (ref 37–47)
HDLC SERPL-MCNC: 86 MG/DL
HGB BLD-MCNC: 14.5 G/DL (ref 12–16)
IMM GRANULOCYTES # BLD AUTO: 0 K/UL (ref 0–0.11)
IMM GRANULOCYTES NFR BLD AUTO: 0 % (ref 0–0.9)
LDLC SERPL CALC-MCNC: 106 MG/DL
LYMPHOCYTES # BLD AUTO: 0.83 K/UL (ref 1–4.8)
LYMPHOCYTES NFR BLD: 27.9 % (ref 22–41)
MCH RBC QN AUTO: 30.6 PG (ref 27–33)
MCHC RBC AUTO-ENTMCNC: 33.5 G/DL (ref 33.6–35)
MCV RBC AUTO: 91.4 FL (ref 81.4–97.8)
MONOCYTES # BLD AUTO: 0.33 K/UL (ref 0–0.85)
MONOCYTES NFR BLD AUTO: 11.1 % (ref 0–13.4)
NEUTROPHILS # BLD AUTO: 1.7 K/UL (ref 2–7.15)
NEUTROPHILS NFR BLD: 57 % (ref 44–72)
NRBC # BLD AUTO: 0 K/UL
NRBC BLD-RTO: 0 /100 WBC
PLATELET # BLD AUTO: 347 K/UL (ref 164–446)
PMV BLD AUTO: 9.4 FL (ref 9–12.9)
POTASSIUM SERPL-SCNC: 4.3 MMOL/L (ref 3.6–5.5)
PROT SERPL-MCNC: 7.4 G/DL (ref 6–8.2)
RBC # BLD AUTO: 4.74 M/UL (ref 4.2–5.4)
SODIUM SERPL-SCNC: 129 MMOL/L (ref 135–145)
TRIGL SERPL-MCNC: 51 MG/DL (ref 0–149)
WBC # BLD AUTO: 3 K/UL (ref 4.8–10.8)

## 2019-10-30 PROCEDURE — 80053 COMPREHEN METABOLIC PANEL: CPT

## 2019-10-30 PROCEDURE — 80061 LIPID PANEL: CPT

## 2019-10-30 PROCEDURE — 36415 COLL VENOUS BLD VENIPUNCTURE: CPT

## 2019-10-30 PROCEDURE — 85025 COMPLETE CBC W/AUTO DIFF WBC: CPT

## 2019-11-04 ENCOUNTER — OFFICE VISIT (OUTPATIENT)
Dept: MEDICAL GROUP | Facility: MEDICAL CENTER | Age: 69
End: 2019-11-04
Payer: COMMERCIAL

## 2019-11-04 VITALS
RESPIRATION RATE: 12 BRPM | DIASTOLIC BLOOD PRESSURE: 78 MMHG | HEART RATE: 85 BPM | TEMPERATURE: 97.8 F | BODY MASS INDEX: 21.02 KG/M2 | SYSTOLIC BLOOD PRESSURE: 130 MMHG | HEIGHT: 62 IN | OXYGEN SATURATION: 97 % | WEIGHT: 114.2 LBS

## 2019-11-04 DIAGNOSIS — Z78.0 MENOPAUSE: ICD-10-CM

## 2019-11-04 DIAGNOSIS — Z00.00 HEALTH CARE MAINTENANCE: ICD-10-CM

## 2019-11-04 DIAGNOSIS — K76.89 BENIGN LIVER CYST: ICD-10-CM

## 2019-11-04 DIAGNOSIS — D25.9 UTERINE LEIOMYOMA, UNSPECIFIED LOCATION: ICD-10-CM

## 2019-11-04 DIAGNOSIS — E78.00 HYPERCHOLESTEROLEMIA: ICD-10-CM

## 2019-11-04 DIAGNOSIS — R91.8 PULMONARY NODULES: ICD-10-CM

## 2019-11-04 DIAGNOSIS — Z00.00 ANNUAL PHYSICAL EXAM: ICD-10-CM

## 2019-11-04 DIAGNOSIS — D70.9 NEUTROPENIA, UNSPECIFIED TYPE (HCC): ICD-10-CM

## 2019-11-04 DIAGNOSIS — I10 ESSENTIAL HYPERTENSION: ICD-10-CM

## 2019-11-04 DIAGNOSIS — E87.1 HYPONATREMIA: ICD-10-CM

## 2019-11-04 PROCEDURE — 99397 PER PM REEVAL EST PAT 65+ YR: CPT | Performed by: INTERNAL MEDICINE

## 2019-11-04 RX ORDER — LOSARTAN POTASSIUM 100 MG/1
100 TABLET ORAL DAILY
Qty: 90 TAB | Refills: 3 | Status: SHIPPED | OUTPATIENT
Start: 2019-11-04 | End: 2020-11-03 | Stop reason: SDUPTHER

## 2019-11-04 NOTE — PROGRESS NOTES
CHIEF COMPLAINT  Chief Complaint   Patient presents with   • Annual Exam   Labs    HPI  Alysa Sarabia is a 69 y.o. female who presents today for the following     Pt has GYN provider: no     Recommendations:  Regular exercise at least 4 days a week  Diet: advised balanced diet  Dental exam at least 1-2 times per year  Sunscreen use: advised     Immunizations:  TdaP: 2014  Pneumonia vaccine: 2016, PPV 23  Shingless vaccine:      Colonoscopy: 2012  Bone density test: pending     GYN   Postmenopausal.     Hypertension, hyponatremia  Meds: valsartan, 320 mg QD, taking as prescribed.    Measuring BP at home: no  No headaches, vision problems, tinnitus.  No chest pain/pressure, palpitations, irregular heart beats, exertional dyspnea, peripheral edema.  Diet: healthy Low salt diet: no  Exercise: yes  BMI: 20    Hyper cholesterolemia  The patient had borderline high cholesterol, with high HDL; no medications.  Diet / Exercise/BMI:  As above  FH: neg    Neutropenia  The patient was found to have slight leukopenia/neutropenia, with a decreased neutrophil count.    She has not had frequent infections.   Stable.     Benign liver cyst  Reviewed the last imaging, no follow-up is needed.    Lung nodules  The patient has been followed up by pulmonology, no change in size of nodules, no further CT follow-up needed.  The last appointment with pulmonary medicine was on 9/3/2019, note was reviewed:  -Reviewed CT chest  -Follow-up as needed, no routine follow-up    Fibroids  Denies pelvic pain, dyspareunia.    Reviewed PMH, PSH, FH, SH, ALL, HCM/IMM, no changes  Reviewed MEDS, no changes    Patient Active Problem List    Diagnosis Date Noted   • Hydronephrosis of right kidney 05/13/2016     Priority: Medium   • Pulmonary nodules 07/16/2019   • Health care maintenance 04/25/2019   • Onychomycosis 04/25/2019   • Hypercholesterolemia 04/25/2019   • Benign liver cyst 07/24/2017   • Uterine leiomyoma 01/03/2017   • Essential  hypertension 09/03/2015     CURRENT MEDICATIONS  Current Outpatient Medications   Medication Sig Dispense Refill   • CALCIUM PO Take  by mouth every day.     • losartan (COZAAR) 100 MG Tab Take 1 Tab by mouth every day. 90 Tab 3   • terbinafine (LAMISIL) 1 % cream Apply BID over fungal infection 15 g 0   • Ibandronate Sodium (BONIVA PO) Take  by mouth Q30 DAYS.     • LUTEIN PO Take  by mouth 2 Times a Day.     • multivitamin (THERAGRAN) Tab Take 1 Tab by mouth every day.       No current facility-administered medications for this visit.      ALLERGIES  Allergies: Diltiazem  PAST MEDICAL HISTORY  Past Medical History:   Diagnosis Date   • Hydronephrosis    • Hypertension    • Hyponatremia    • Leukopenia    • Liver cyst     benign, no f/u   • Small bowel obstruction (HCC)     R   • Varicose vein of leg      SURGICAL HISTORY  She  has a past surgical history that includes breast biopsy (2007); colon resection laparoscopic (3/27/2014); exploratory laparotomy (5/3/2016); cystoscopy stent placement (Right, 5/13/2016); retrogrades (5/13/2016); exploratory laparotomy (12/14/2016); and lumpectomy (Left, 2005).  SOCIAL HISTORY  Social History     Tobacco Use   • Smoking status: Never Smoker   • Smokeless tobacco: Never Used   Substance Use Topics   • Alcohol use: Yes     Alcohol/week: 7.2 oz     Types: 7 Standard drinks or equivalent, 5 Glasses of wine per week     Comment: 4 per week   • Drug use: No     Social History     Patient does not qualify to have social determinant information on file (likely too young).   Social History Narrative    Lives with .     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  HENT: Negative for congestion, sore throat.  Eyes: Negative for vision problems.   Respiratory: Negative for cough, shortness of breath.  Cardiovascular: Negative for chest pain, palpitations.   Gastrointestinal: Negative for heartburn, nausea, abdominal pain.   Genitourinary: Negative for dysuria.  Musculoskeletal: Negative for significant myalgia, back and joint pain.   Skin: Negative for rash.   Neuro: Negative for dizziness, weakness and headaches.   Endo/Heme/Allergies: Does not bruise/bleed easily.   Psychiatric/Behavioral: Negative for depression.    PHYSICAL EXAM   Blood Pressure 130/78 (BP Location: Left arm, Patient Position: Sitting, BP Cuff Size: Adult)   Pulse 85   Temperature 36.6 °C (97.8 °F) (Temporal)   Respiration 12   Height 1.575 m (5' 2\")   Weight 51.8 kg (114 lb 3.2 oz)   Oxygen Saturation 97%  Body mass index is 20.89 kg/m².  General:  NAD, well appearing  HEENT:   NC/AT, PERRLA, EOMI, TMs are clear. Oropharyngeal mucosa is pink,  without lesions;  no cervical / supraclavicular  lymphadenopathy, no thyromegaly.    Cardiovascular: RRR.   No m/r/g.       Lungs:   CTAB, no w/r/r, no respiratory distress.  Abdomen: Soft, NT/ND; no hepatosplenomegaly.  Extremities:  2+ DP and radial pulses bilaterally.  No c/c/e.   Skin:  Warm, dry.  No erythema. No rash.   Neurologic: Alert & oriented x 3. CN II-XII grossly intact. No focal deficits.  Psychiatric:  Affect normal, mood normal, judgment normal.    Labs     Labs are reviewed and discussed with a patient  Lab Results   Component Value Date/Time    CHOLSTRLTOT 202 (H) 10/30/2019 07:14 AM     (H) 10/30/2019 07:14 AM    HDL 86 10/30/2019 07:14 AM    TRIGLYCERIDE 51 10/30/2019 07:14 AM       Lab Results   Component Value Date/Time    SODIUM " 129 (L) 10/30/2019 07:14 AM    POTASSIUM 4.3 10/30/2019 07:14 AM    CHLORIDE 96 10/30/2019 07:14 AM    CO2 27 10/30/2019 07:14 AM    GLUCOSE 91 10/30/2019 07:14 AM    BUN 10 10/30/2019 07:14 AM    CREATININE 0.57 10/30/2019 07:14 AM    CREATININE 0.6 05/02/2007 09:18 AM     Lab Results   Component Value Date/Time    ALKPHOSPHAT 70 10/30/2019 07:14 AM    ASTSGOT 24 10/30/2019 07:14 AM    ALTSGPT 17 10/30/2019 07:14 AM    TBILIRUBIN 1.1 10/30/2019 07:14 AM      Lab Results   Component Value Date/Time    WBC 3.0 (L) 10/30/2019 07:14 AM    RBC 4.74 10/30/2019 07:14 AM    HEMOGLOBIN 14.5 10/30/2019 07:14 AM    HEMATOCRIT 43.3 10/30/2019 07:14 AM    MCV 91.4 10/30/2019 07:14 AM    MCH 30.6 10/30/2019 07:14 AM    MCHC 33.5 (L) 10/30/2019 07:14 AM    MPV 9.4 10/30/2019 07:14 AM    NEUTSPOLYS 57.00 10/30/2019 07:14 AM    LYMPHOCYTES 27.90 10/30/2019 07:14 AM    MONOCYTES 11.10 10/30/2019 07:14 AM    EOSINOPHILS 2.30 10/30/2019 07:14 AM    BASOPHILS 1.70 10/30/2019 07:14 AM    ANISOCYTOSIS 1+ 05/05/2016 04:10 AM      Imaging     Reviewed and discussed:    CT chest, 7/24/2019  1.  There is no change in single small pulmonary nodule in each lung since the prior CT scans. These lesions appear benign.  2.  Benign-appearing liver lesion is again identified.  3.  No new pulmonary nodules or masses are identified.    Ultrasound abdomen, 11/17/2018  Diminished size of gallbladder polyp  Mild increase in size of 15 mm left hepatic simple cyst  Stable prominent right extrarenal pelvis    Assessment and Plan     Alysa Sarabia is a 69 y.o. female    1. Annual physical exam  Reviewed PMH, PSH, FH, SH, ALL, MEDS, HCM/IMM.   Advised healthy habits, diet, exercise.    2. Health care maintenance  Per HPI    3. Menopause  - DS-BONE DENSITY STUDY (DEXA); Future    4. Essential hypertension  Controlled, continue current treatment  - Comp Metabolic Panel; Future  - losartan (COZAAR) 100 MG Tab; Take 1 Tab by mouth every day.  Dispense:  90 Tab; Refill: 3    5. Hyponatremia  Stable, advised to use liberal salt intake  - Comp Metabolic Panel; Future    6. Hypercholesterolemia  Discussed treatment options, patient will continue low-calorie diet, daily exercise, weight control  - Comp Metabolic Panel; Future  - Lipid Profile; Future    7. Neutropenia, unspecified type (HCC)  Mild, follow-up labs  - CBC WITH DIFFERENTIAL; Future    8. Benign liver cyst  Benign, no routine follow-up    9. Pulmonary nodules  Stable, no routine follow-up    10. Uterine leiomyoma, unspecified location  Asymptomatic    Counseling:   - Smoking:  Nonsmoker    Followup: in 6 months and prn    All questions are answered.    Please note that this dictation was created using voice recognition software, and that there might be errors of ashok and possibly content.

## 2020-01-09 ENCOUNTER — OFFICE VISIT (OUTPATIENT)
Dept: MEDICAL GROUP | Facility: MEDICAL CENTER | Age: 70
End: 2020-01-09
Payer: COMMERCIAL

## 2020-01-09 VITALS
SYSTOLIC BLOOD PRESSURE: 124 MMHG | OXYGEN SATURATION: 97 % | HEIGHT: 62 IN | DIASTOLIC BLOOD PRESSURE: 78 MMHG | TEMPERATURE: 97.9 F | HEART RATE: 72 BPM | BODY MASS INDEX: 21.18 KG/M2 | WEIGHT: 115.08 LBS

## 2020-01-09 DIAGNOSIS — J06.9 URTI (ACUTE UPPER RESPIRATORY INFECTION): ICD-10-CM

## 2020-01-09 DIAGNOSIS — R05.9 COUGH: ICD-10-CM

## 2020-01-09 PROCEDURE — 99214 OFFICE O/P EST MOD 30 MIN: CPT | Performed by: INTERNAL MEDICINE

## 2020-01-09 RX ORDER — BENZONATATE 100 MG/1
100 CAPSULE ORAL 3 TIMES DAILY PRN
Qty: 60 CAP | Refills: 0 | Status: SHIPPED | OUTPATIENT
Start: 2020-01-09 | End: 2020-06-18

## 2020-01-09 ASSESSMENT — PATIENT HEALTH QUESTIONNAIRE - PHQ9: CLINICAL INTERPRETATION OF PHQ2 SCORE: 0

## 2020-01-09 NOTE — PROGRESS NOTES
CHIEF COMPLAINT  Cough    HPI  Patient is a 69 y.o. female patient who presents today for the following     Cough, UTI  The patient got sick 10 days with:  · Dry cough  · Initial  · Nasal congestion w clear  · Body aches, fatigue  · Sore throat  · Swollen glands, difficulty swallowing  · Earache    Denies:   · Fever, chills, body aches, HA  · Postnasal drip, pain in sinus areas  · Wheezing, chest pain  · Decreased appetite, nausea, vomiting, diarrhea, abdominal pain  · Skin rash.    · Meds used:   Cold meds  · Sick contact:  Works in school  · Flu vaccine:    Yes    Reviewed PMH, PSH, FH, SH, ALL, HCM/IMM, no changes  Reviewed MEDS, no changes    Patient Active Problem List    Diagnosis Date Noted   • Hydronephrosis of right kidney 05/13/2016     Priority: Medium   • Neutropenia (HCC) 11/04/2019   • Hyponatremia 11/04/2019   • Pulmonary nodules 07/16/2019   • Health care maintenance 04/25/2019   • Onychomycosis 04/25/2019   • Hypercholesterolemia 04/25/2019   • Benign liver cyst 07/24/2017   • Uterine leiomyoma 01/03/2017   • Essential hypertension 09/03/2015     CURRENT MEDICATIONS  Current Outpatient Medications   Medication Sig Dispense Refill   • losartan (COZAAR) 100 MG Tab Take 1 Tab by mouth every day. 90 Tab 3   • CALCIUM PO Take  by mouth every day.     • Ibandronate Sodium (BONIVA PO) Take  by mouth Q30 DAYS.     • LUTEIN PO Take  by mouth 2 Times a Day.     • multivitamin (THERAGRAN) Tab Take 1 Tab by mouth every day.       No current facility-administered medications for this visit.      ALLERGIES  Allergies: Diltiazem  PAST MEDICAL HISTORY  Past Medical History:   Diagnosis Date   • Hydronephrosis    • Hypertension    • Hyponatremia    • Leukopenia    • Liver cyst     benign, no f/u   • Small bowel obstruction (HCC)     R   • Varicose vein of leg      SURGICAL HISTORY  She  has a past surgical history that includes breast biopsy (2007); colon resection laparoscopic (3/27/2014); exploratory laparotomy  "(5/3/2016); cystoscopy stent placement (Right, 5/13/2016); retrogrades (5/13/2016); exploratory laparotomy (12/14/2016); and lumpectomy (Left, 2005).  SOCIAL HISTORY  Social History     Tobacco Use   • Smoking status: Never Smoker   • Smokeless tobacco: Never Used   Substance Use Topics   • Alcohol use: Yes     Alcohol/week: 7.2 oz     Types: 7 Standard drinks or equivalent, 5 Glasses of wine per week     Comment: 4 per week   • Drug use: No     Social History     Patient does not qualify to have social determinant information on file (likely too young).   Social History Narrative    Lives with .     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  HENT: as above.  Eyes: Negative for blurred vision.   Respiratory: as above.  Cardiovascular: Negative for chest pain, palpitations.   Gastrointestinal: Negative for heartburn, nausea, abdominal pain.   Genitourinary: Negative for dysuria.  Musculoskeletal: Negative for significant myalgias, back pain and joint pain.   Skin: Negative for rash and itching.   Neuro: Negative for dizziness, weakness and headaches.   Endo/Heme/Allergies: Does not bruise/bleed easily.   Psychiatric/Behavioral: Negative for depression.    PHYSICAL EXAM   Blood Pressure 124/78   Pulse 72   Temperature 36.6 °C (97.9 °F) (Temporal)   Height 1.575 m (5' 2\")   Weight 52.2 kg (115 lb 1.3 oz)   Oxygen Saturation 97%   Body Mass Index 21.05 kg/m²   General:  NAD, appears acutely sick  HEENT:   NC/AT, " PERRLA, EOMI, TMs are clear. Pharyngeal mucosa is erythematous,  without lesions;  no cervical LAD.    Cardiovascular: RRR.   No m/r/g. No carotid bruits .      Lungs:   CTAB, no w/r/r, no respiratory distress.  Abdomen: Soft, NT/ND + BS; no suprapubic tenderness; no masses or hepatosplenomegaly.  Extremities:  2+ DP and radial pulses bilaterally.  No c/c/e.   Skin:  Warm, dry.  No erythema. No rash.   Neurologic: Alert & oriented x 3. No focal deficits.  Psychiatric:  Affect normal, mood normal, judgment normal.    LABS     Labs are reviewed and discussed with a patient    >labs  Lab Results   Component Value Date/Time    WBC 3.0 (L) 10/30/2019 07:14 AM    RBC 4.74 10/30/2019 07:14 AM    HEMOGLOBIN 14.5 10/30/2019 07:14 AM    HEMATOCRIT 43.3 10/30/2019 07:14 AM    MCV 91.4 10/30/2019 07:14 AM    MCH 30.6 10/30/2019 07:14 AM    MCHC 33.5 (L) 10/30/2019 07:14 AM    MPV 9.4 10/30/2019 07:14 AM    NEUTSPOLYS 57.00 10/30/2019 07:14 AM    LYMPHOCYTES 27.90 10/30/2019 07:14 AM    MONOCYTES 11.10 10/30/2019 07:14 AM    EOSINOPHILS 2.30 10/30/2019 07:14 AM    BASOPHILS 1.70 10/30/2019 07:14 AM    ANISOCYTOSIS 1+ 05/05/2016 04:10 AM      IMAGING     None    ASSESMENT AND PLAN        1. Cough  - DX-CHEST-2 VIEWS; Future  - CBC WITH DIFFERENTIAL; Future  Advised   - increase fluid intake;   - may take Tylenol/ibuprofen for fever, pain    2. URTI (acute upper respiratory infection)  This is probably viral upper respiratory tract infection, all symptoms improved.  If symptoms still persist in 1 week, she will do CBC and chest x-ray, earlier if worsening symptoms    - DX-CHEST-2 VIEWS; Future    Followup: as needed    All questions are answered.    Please note that this dictation was created using voice recognition software, and that there might be errors of ashok and possibly content.

## 2020-02-11 ENCOUNTER — HOSPITAL ENCOUNTER (OUTPATIENT)
Dept: RADIOLOGY | Facility: MEDICAL CENTER | Age: 70
End: 2020-02-11
Attending: INTERNAL MEDICINE
Payer: COMMERCIAL

## 2020-02-11 DIAGNOSIS — Z78.0 MENOPAUSE: ICD-10-CM

## 2020-02-11 PROCEDURE — 77080 DXA BONE DENSITY AXIAL: CPT

## 2020-05-19 NOTE — ASSESSMENT & PLAN NOTE
Has chronic anxiety. She will have anxiety prior to her job if there is testing. She is a  at [employer]. Denies any chronic anxiety. No depression. The past she was given Ativan by her son and it was a very small pill that helped with her condition. Doesn't have symptoms weekly.  
This is a 68-year-old female who is here today to discuss her chronic history of hypertension. Although chronic she was only recently in July placed on medication. Given Cozaar 100 mg daily. She has a blood pressure cuff at home but the readings have been erratic. Here today to have her blood pressure evaluated. Denies any chest pain or shortness of breath.  
Abdominal Pain, N/V/D

## 2020-06-06 ENCOUNTER — HOSPITAL ENCOUNTER (OUTPATIENT)
Dept: LAB | Facility: MEDICAL CENTER | Age: 70
End: 2020-06-06
Attending: NURSE PRACTITIONER
Payer: COMMERCIAL

## 2020-06-06 ENCOUNTER — HOSPITAL ENCOUNTER (OUTPATIENT)
Dept: LAB | Facility: MEDICAL CENTER | Age: 70
End: 2020-06-06
Attending: INTERNAL MEDICINE
Payer: COMMERCIAL

## 2020-06-06 DIAGNOSIS — E87.1 HYPONATREMIA: ICD-10-CM

## 2020-06-06 DIAGNOSIS — I10 ESSENTIAL HYPERTENSION: ICD-10-CM

## 2020-06-06 DIAGNOSIS — D70.9 NEUTROPENIA, UNSPECIFIED TYPE (HCC): ICD-10-CM

## 2020-06-06 DIAGNOSIS — E78.00 HYPERCHOLESTEROLEMIA: ICD-10-CM

## 2020-06-06 LAB
25(OH)D3 SERPL-MCNC: 29 NG/ML (ref 30–100)
ALBUMIN SERPL BCP-MCNC: 4.5 G/DL (ref 3.2–4.9)
ALBUMIN SERPL BCP-MCNC: 4.5 G/DL (ref 3.2–4.9)
ALBUMIN/GLOB SERPL: 1.5 G/DL
ALBUMIN/GLOB SERPL: 1.5 G/DL
ALP SERPL-CCNC: 81 U/L (ref 30–99)
ALP SERPL-CCNC: 83 U/L (ref 30–99)
ALT SERPL-CCNC: 18 U/L (ref 2–50)
ALT SERPL-CCNC: 19 U/L (ref 2–50)
ANION GAP SERPL CALC-SCNC: 11 MMOL/L (ref 7–16)
ANION GAP SERPL CALC-SCNC: 15 MMOL/L (ref 7–16)
AST SERPL-CCNC: 26 U/L (ref 12–45)
AST SERPL-CCNC: 26 U/L (ref 12–45)
BASOPHILS # BLD AUTO: 2.1 % (ref 0–1.8)
BASOPHILS # BLD: 0.06 K/UL (ref 0–0.12)
BILIRUB SERPL-MCNC: 0.8 MG/DL (ref 0.1–1.5)
BILIRUB SERPL-MCNC: 0.9 MG/DL (ref 0.1–1.5)
BUN SERPL-MCNC: 8 MG/DL (ref 8–22)
BUN SERPL-MCNC: 9 MG/DL (ref 8–22)
CALCIUM SERPL-MCNC: 8.9 MG/DL (ref 8.5–10.5)
CALCIUM SERPL-MCNC: 9 MG/DL (ref 8.5–10.5)
CHLORIDE SERPL-SCNC: 90 MMOL/L (ref 96–112)
CHLORIDE SERPL-SCNC: 93 MMOL/L (ref 96–112)
CHOLEST SERPL-MCNC: 214 MG/DL (ref 100–199)
CO2 SERPL-SCNC: 25 MMOL/L (ref 20–33)
CO2 SERPL-SCNC: 25 MMOL/L (ref 20–33)
CREAT SERPL-MCNC: 0.48 MG/DL (ref 0.5–1.4)
CREAT SERPL-MCNC: 0.49 MG/DL (ref 0.5–1.4)
EOSINOPHIL # BLD AUTO: 0.03 K/UL (ref 0–0.51)
EOSINOPHIL NFR BLD: 1.1 % (ref 0–6.9)
ERYTHROCYTE [DISTWIDTH] IN BLOOD BY AUTOMATED COUNT: 44.1 FL (ref 35.9–50)
FASTING STATUS PATIENT QL REPORTED: NORMAL
FASTING STATUS PATIENT QL REPORTED: NORMAL
GLOBULIN SER CALC-MCNC: 3 G/DL (ref 1.9–3.5)
GLOBULIN SER CALC-MCNC: 3.1 G/DL (ref 1.9–3.5)
GLUCOSE SERPL-MCNC: 103 MG/DL (ref 65–99)
GLUCOSE SERPL-MCNC: 104 MG/DL (ref 65–99)
HCT VFR BLD AUTO: 42.4 % (ref 37–47)
HDLC SERPL-MCNC: 87 MG/DL
HGB BLD-MCNC: 14 G/DL (ref 12–16)
IMM GRANULOCYTES # BLD AUTO: 0 K/UL (ref 0–0.11)
IMM GRANULOCYTES NFR BLD AUTO: 0 % (ref 0–0.9)
LDLC SERPL CALC-MCNC: 113 MG/DL
LYMPHOCYTES # BLD AUTO: 0.85 K/UL (ref 1–4.8)
LYMPHOCYTES NFR BLD: 29.9 % (ref 22–41)
MAGNESIUM SERPL-MCNC: 2.1 MG/DL (ref 1.5–2.5)
MCH RBC QN AUTO: 30.8 PG (ref 27–33)
MCHC RBC AUTO-ENTMCNC: 33 G/DL (ref 33.6–35)
MCV RBC AUTO: 93.2 FL (ref 81.4–97.8)
MONOCYTES # BLD AUTO: 0.27 K/UL (ref 0–0.85)
MONOCYTES NFR BLD AUTO: 9.5 % (ref 0–13.4)
NEUTROPHILS # BLD AUTO: 1.63 K/UL (ref 2–7.15)
NEUTROPHILS NFR BLD: 57.4 % (ref 44–72)
NRBC # BLD AUTO: 0 K/UL
NRBC BLD-RTO: 0 /100 WBC
PHOSPHATE SERPL-MCNC: 3.1 MG/DL (ref 2.5–4.5)
PLATELET # BLD AUTO: 347 K/UL (ref 164–446)
PMV BLD AUTO: 9.3 FL (ref 9–12.9)
POTASSIUM SERPL-SCNC: 4.3 MMOL/L (ref 3.6–5.5)
POTASSIUM SERPL-SCNC: 4.3 MMOL/L (ref 3.6–5.5)
PROT SERPL-MCNC: 7.5 G/DL (ref 6–8.2)
PROT SERPL-MCNC: 7.6 G/DL (ref 6–8.2)
PTH-INTACT SERPL-MCNC: 89.6 PG/ML (ref 14–72)
RBC # BLD AUTO: 4.55 M/UL (ref 4.2–5.4)
SODIUM SERPL-SCNC: 129 MMOL/L (ref 135–145)
SODIUM SERPL-SCNC: 130 MMOL/L (ref 135–145)
TRIGL SERPL-MCNC: 70 MG/DL (ref 0–149)
TSH SERPL DL<=0.005 MIU/L-ACNC: 3.42 UIU/ML (ref 0.38–5.33)
WBC # BLD AUTO: 2.8 K/UL (ref 4.8–10.8)

## 2020-06-06 PROCEDURE — 83970 ASSAY OF PARATHORMONE: CPT

## 2020-06-06 PROCEDURE — 80053 COMPREHEN METABOLIC PANEL: CPT | Mod: 91

## 2020-06-06 PROCEDURE — 84443 ASSAY THYROID STIM HORMONE: CPT

## 2020-06-06 PROCEDURE — 83735 ASSAY OF MAGNESIUM: CPT

## 2020-06-06 PROCEDURE — 85025 COMPLETE CBC W/AUTO DIFF WBC: CPT

## 2020-06-06 PROCEDURE — 80061 LIPID PANEL: CPT

## 2020-06-06 PROCEDURE — 36415 COLL VENOUS BLD VENIPUNCTURE: CPT

## 2020-06-06 PROCEDURE — 80053 COMPREHEN METABOLIC PANEL: CPT

## 2020-06-06 PROCEDURE — 84100 ASSAY OF PHOSPHORUS: CPT

## 2020-06-06 PROCEDURE — 82306 VITAMIN D 25 HYDROXY: CPT

## 2020-06-18 ENCOUNTER — OFFICE VISIT (OUTPATIENT)
Dept: MEDICAL GROUP | Age: 70
End: 2020-06-18
Payer: COMMERCIAL

## 2020-06-18 VITALS
HEART RATE: 68 BPM | HEIGHT: 58 IN | OXYGEN SATURATION: 97 % | SYSTOLIC BLOOD PRESSURE: 138 MMHG | TEMPERATURE: 97.1 F | BODY MASS INDEX: 24.52 KG/M2 | DIASTOLIC BLOOD PRESSURE: 82 MMHG | WEIGHT: 116.8 LBS

## 2020-06-18 DIAGNOSIS — E87.1 HYPONATREMIA: ICD-10-CM

## 2020-06-18 DIAGNOSIS — D70.9 NEUTROPENIA, UNSPECIFIED TYPE (HCC): ICD-10-CM

## 2020-06-18 DIAGNOSIS — E55.9 HYPOVITAMINOSIS D: ICD-10-CM

## 2020-06-18 DIAGNOSIS — R73.01 IFG (IMPAIRED FASTING GLUCOSE): ICD-10-CM

## 2020-06-18 DIAGNOSIS — I10 ESSENTIAL HYPERTENSION: ICD-10-CM

## 2020-06-18 DIAGNOSIS — E78.00 HYPERCHOLESTEROLEMIA: ICD-10-CM

## 2020-06-18 PROCEDURE — 99214 OFFICE O/P EST MOD 30 MIN: CPT | Performed by: INTERNAL MEDICINE

## 2020-06-18 RX ORDER — AMLODIPINE BESYLATE 5 MG/1
5 TABLET ORAL DAILY
Qty: 30 TAB | Refills: 0 | Status: SHIPPED | OUTPATIENT
Start: 2020-06-18 | End: 2020-07-15

## 2020-06-18 ASSESSMENT — FIBROSIS 4 INDEX: FIB4 SCORE: 1.15

## 2020-06-18 NOTE — PROGRESS NOTES
CHIEF COMPLAINT  Chief Complaint   Patient presents with   • Lab Results   HTN, labs    HPI  Alysa Saarbia is a 69 y.o. female who presents today for the following     Hypertension, hyponatremia  BP was high at home and in the office    Meds: Losartan 100 mg QD, taking as prescribed.    Measuring BP at home: no  No headaches, vision problems, tinnitus.  No chest pain/pressure, palpitations, irregular heart beats, exertional dyspnea, peripheral edema.  Diet: healthy Low salt diet: no  Exercise: yes  BMI: 20     Hypercholesterolemia  The patient had borderline high cholesterol, with high HDL; no medications.  Diet / Exercise/BMI:  As above  FH: neg    IFG  The patient had elevated FBG.  No polydipsia, polyphagia, polyuria.  No abdominal pain, weight loss, fatigue.  Diet / Exercise/BMI:  As above  FH of DM: neg    Hypovitaminosis D  The patient had low vitamin D level.  Vitamin D supplement: 2000 U per day OTC    Neutropenia  The patient was found to have slight leukopenia/neutropenia, with a decreased neutrophil count.    She has not had frequent infections.   Stable.     Reviewed PMH, PSH, FH, SH, ALL, HCM/IMM, no changes  Reviewed MEDS, no changes    Patient Active Problem List    Diagnosis Date Noted   • Hydronephrosis of right kidney 05/13/2016     Priority: Medium   • Neutropenia (HCC) 11/04/2019   • Hyponatremia 11/04/2019   • Pulmonary nodules 07/16/2019   • Health care maintenance 04/25/2019   • Onychomycosis 04/25/2019   • Hypercholesterolemia 04/25/2019   • Benign liver cyst 07/24/2017   • Uterine leiomyoma 01/03/2017   • Essential hypertension 09/03/2015     CURRENT MEDICATIONS  Current Outpatient Medications   Medication Sig Dispense Refill   • benzonatate (TESSALON) 100 MG Cap Take 1 Cap by mouth 3 times a day as needed for Cough. 60 Cap 0   • losartan (COZAAR) 100 MG Tab Take 1 Tab by mouth every day. 90 Tab 3   • CALCIUM PO Take  by mouth every day.     • Ibandronate Sodium (BONIVA PO) Take   by mouth Q30 DAYS.     • LUTEIN PO Take  by mouth 2 Times a Day.     • multivitamin (THERAGRAN) Tab Take 1 Tab by mouth every day.       No current facility-administered medications for this visit.      ALLERGIES  Allergies: Diltiazem  PAST MEDICAL HISTORY  Past Medical History:   Diagnosis Date   • Hydronephrosis    • Hypertension    • Hyponatremia    • Leukopenia    • Liver cyst     benign, no f/u   • Small bowel obstruction (HCC)     R   • Varicose vein of leg      SURGICAL HISTORY  She  has a past surgical history that includes breast biopsy (2007); colon resection laparoscopic (3/27/2014); exploratory laparotomy (5/3/2016); cystoscopy stent placement (Right, 5/13/2016); retrogrades (5/13/2016); exploratory laparotomy (12/14/2016); and lumpectomy (Left, 2005).  SOCIAL HISTORY  Social History     Tobacco Use   • Smoking status: Never Smoker   • Smokeless tobacco: Never Used   Substance Use Topics   • Alcohol use: Yes     Alcohol/week: 7.2 oz     Types: 7 Standard drinks or equivalent, 5 Glasses of wine per week     Comment: 4 per week   • Drug use: No     Social History     Social History Narrative    Lives with .     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  HENT: Negative for congestion, sore throat.  Eyes: Negative for vision problems.   Respiratory: Negative for cough, shortness of breath.  Cardiovascular: Negative for chest pain, palpitations.   Gastrointestinal: Negative for  "heartburn, nausea, abdominal pain.   Genitourinary: Negative for dysuria.  Musculoskeletal: Negative for significant myalgia, back and joint pain.   Skin: Negative for rash.   Neuro: Negative for dizziness, weakness and headaches.   Endo/Heme/Allergies: Does not bruise/bleed easily.   Psychiatric/Behavioral: Negative for depression.    PHYSICAL EXAM   Blood Pressure 138/82 (BP Location: Right arm, Patient Position: Sitting, BP Cuff Size: Adult)   Pulse 68   Temperature 36.2 °C (97.1 °F) (Temporal)   Height 1.473 m (4' 10\")   Weight 53 kg (116 lb 12.8 oz)   Oxygen Saturation 97%  Body mass index is 24.41 kg/m².  General:  NAD, well appearing  HEENT:   NC/AT, PERRLA, EOMI.  Cardiovascular: RRR.   No m/r/g.       Lungs:   CTAB, no w/r/r, no respiratory distress.  Abdomen: Soft, NT/ND; no hepatosplenomegaly.  Extremities:  2+ DP and radial pulses bilaterally.  No c/c/e.   Skin:  Warm, dry.  No erythema. No rash.   Neurologic: Alert & oriented x 3. CN II-XII grossly intact. No focal deficits.  Psychiatric:  Affect normal, mood normal, judgment normal.    Labs     Labs are reviewed and discussed with a patient  Lab Results   Component Value Date/Time    CHOLSTRLTOT 214 (H) 06/06/2020 07:57 AM     (H) 06/06/2020 07:57 AM    HDL 87 06/06/2020 07:57 AM    TRIGLYCERIDE 70 06/06/2020 07:57 AM       Lab Results   Component Value Date/Time    SODIUM 130 (L) 06/06/2020 07:57 AM    SODIUM 129 (L) 06/06/2020 07:57 AM    POTASSIUM 4.3 06/06/2020 07:57 AM    POTASSIUM 4.3 06/06/2020 07:57 AM    CHLORIDE 90 (L) 06/06/2020 07:57 AM    CHLORIDE 93 (L) 06/06/2020 07:57 AM    CO2 25 06/06/2020 07:57 AM    CO2 25 06/06/2020 07:57 AM    GLUCOSE 104 (H) 06/06/2020 07:57 AM    GLUCOSE 103 (H) 06/06/2020 07:57 AM    BUN 9 06/06/2020 07:57 AM    BUN 8 06/06/2020 07:57 AM    CREATININE 0.48 (L) 06/06/2020 07:57 AM    CREATININE 0.49 (L) 06/06/2020 07:57 AM    CREATININE 0.6 05/02/2007 09:18 AM     Lab Results   Component Value " Date/Time    ALKPHOSPHAT 83 06/06/2020 07:57 AM    ALKPHOSPHAT 81 06/06/2020 07:57 AM    ASTSGOT 26 06/06/2020 07:57 AM    ASTSGOT 26 06/06/2020 07:57 AM    ALTSGPT 18 06/06/2020 07:57 AM    ALTSGPT 19 06/06/2020 07:57 AM    TBILIRUBIN 0.8 06/06/2020 07:57 AM    TBILIRUBIN 0.9 06/06/2020 07:57 AM      No results found for: HBA1C  No results found for: TSH  No results found for: FREET4    Lab Results   Component Value Date/Time    WBC 2.8 (L) 06/06/2020 07:57 AM    RBC 4.55 06/06/2020 07:57 AM    HEMOGLOBIN 14.0 06/06/2020 07:57 AM    HEMATOCRIT 42.4 06/06/2020 07:57 AM    MCV 93.2 06/06/2020 07:57 AM    MCH 30.8 06/06/2020 07:57 AM    MCHC 33.0 (L) 06/06/2020 07:57 AM    MPV 9.3 06/06/2020 07:57 AM    NEUTSPOLYS 57.40 06/06/2020 07:57 AM    LYMPHOCYTES 29.90 06/06/2020 07:57 AM    MONOCYTES 9.50 06/06/2020 07:57 AM    EOSINOPHILS 1.10 06/06/2020 07:57 AM    BASOPHILS 2.10 (H) 06/06/2020 07:57 AM    ANISOCYTOSIS 1+ 05/05/2016 04:10 AM      Imaging     None    Assessment and Plan     Alysa Sarabia is a 69 y.o. female    1. Essential hypertension  Uncontrolled, add amlodipine, continue losartan  - amLODIPine (NORVASC) 5 MG Tab; Take 1 Tab by mouth every day.  Dispense: 30 Tab; Refill: 0  - Comp Metabolic Panel; Future    2. Hyponatremia  Advised to add salt with food  - Comp Metabolic Panel; Future    3. Hypercholesterolemia  Borderline, discussed treatment options, patient prefers low-calorie diet, daily exercise, weight control  - Comp Metabolic Panel; Future  - Lipid Profile; Future    4. IFG (impaired fasting glucose)  Discussed about risk to develop DM.   Advised low carb diet, exercise, watch for WT.   - Comp Metabolic Panel; Future  - HEMOGLOBIN A1C; Future    5. Hypovitaminosis D  Advised 5000 units of vitamin D daily OTC  - VITAMIN D,25 HYDROXY; Future    6. Neutropenia, unspecified type (HCC)  Stable, follow-up labs  - CBC WITH DIFFERENTIAL; Future    Counseling:   - Smoking:  Nonsmoker    Followup:  In 4 months, and as needed    All questions are answered.    Please note that this dictation was created using voice recognition software, and that there might be errors of ashok and possibly content.

## 2020-07-15 ENCOUNTER — TELEPHONE (OUTPATIENT)
Dept: MEDICAL GROUP | Age: 70
End: 2020-07-15

## 2020-07-15 DIAGNOSIS — I10 ESSENTIAL HYPERTENSION: ICD-10-CM

## 2020-07-15 RX ORDER — HYDROCHLOROTHIAZIDE 25 MG/1
25 TABLET ORAL DAILY
Qty: 30 TAB | Refills: 2 | Status: SHIPPED | OUTPATIENT
Start: 2020-07-15 | End: 2020-07-20

## 2020-07-15 NOTE — TELEPHONE ENCOUNTER
Phone Number Called: [phone] (cell)       Call outcome: Did not leave a detailed message. Requested patient to call back.    Message: LVM for patient. Patient has called a few times today. When I finally had a minute to call her back, LVM on her cell. I read to her Dr Milagro De Leon Message. Patient has concern for swollen ankles and high blood pressure. I asked patient to check Haley and respond back to Dr Humphrey.

## 2020-07-20 RX ORDER — METOPROLOL SUCCINATE 25 MG/1
25 TABLET, EXTENDED RELEASE ORAL DAILY
Qty: 30 TAB | Refills: 1 | Status: SHIPPED | OUTPATIENT
Start: 2020-07-20 | End: 2020-11-03

## 2020-07-27 DIAGNOSIS — I10 ESSENTIAL HYPERTENSION: ICD-10-CM

## 2020-07-27 RX ORDER — HYDROCHLOROTHIAZIDE 25 MG/1
25 TABLET ORAL DAILY
Qty: 90 TAB | Refills: 0 | Status: SHIPPED | OUTPATIENT
Start: 2020-07-27 | End: 2020-11-03 | Stop reason: SDUPTHER

## 2020-10-24 ENCOUNTER — HOSPITAL ENCOUNTER (OUTPATIENT)
Dept: LAB | Facility: MEDICAL CENTER | Age: 70
End: 2020-10-24
Attending: INTERNAL MEDICINE
Payer: MEDICARE

## 2020-10-24 ENCOUNTER — HOSPITAL ENCOUNTER (OUTPATIENT)
Dept: LAB | Facility: MEDICAL CENTER | Age: 70
End: 2020-10-24
Attending: NURSE PRACTITIONER
Payer: MEDICARE

## 2020-10-24 DIAGNOSIS — I10 ESSENTIAL HYPERTENSION: ICD-10-CM

## 2020-10-24 DIAGNOSIS — R73.01 IFG (IMPAIRED FASTING GLUCOSE): ICD-10-CM

## 2020-10-24 DIAGNOSIS — D70.9 NEUTROPENIA, UNSPECIFIED TYPE (HCC): ICD-10-CM

## 2020-10-24 DIAGNOSIS — E87.1 HYPONATREMIA: ICD-10-CM

## 2020-10-24 DIAGNOSIS — E55.9 HYPOVITAMINOSIS D: ICD-10-CM

## 2020-10-24 DIAGNOSIS — E78.00 HYPERCHOLESTEROLEMIA: ICD-10-CM

## 2020-10-24 LAB
25(OH)D3 SERPL-MCNC: 54 NG/ML (ref 30–100)
25(OH)D3 SERPL-MCNC: 54 NG/ML (ref 30–100)
ALBUMIN SERPL BCP-MCNC: 4.3 G/DL (ref 3.2–4.9)
ALBUMIN/GLOB SERPL: 1.3 G/DL
ALP SERPL-CCNC: 93 U/L (ref 30–99)
ALT SERPL-CCNC: 19 U/L (ref 2–50)
ANION GAP SERPL CALC-SCNC: 10 MMOL/L (ref 7–16)
AST SERPL-CCNC: 19 U/L (ref 12–45)
BASOPHILS # BLD AUTO: 1.3 % (ref 0–1.8)
BASOPHILS # BLD: 0.05 K/UL (ref 0–0.12)
BILIRUB SERPL-MCNC: 0.7 MG/DL (ref 0.1–1.5)
BUN SERPL-MCNC: 11 MG/DL (ref 8–22)
CA-I SERPL-SCNC: 1.2 MMOL/L (ref 1.1–1.3)
CALCIUM SERPL-MCNC: 9.6 MG/DL (ref 8.5–10.5)
CALCIUM SERPL-MCNC: 9.6 MG/DL (ref 8.5–10.5)
CHLORIDE SERPL-SCNC: 90 MMOL/L (ref 96–112)
CHOLEST SERPL-MCNC: 208 MG/DL (ref 100–199)
CO2 SERPL-SCNC: 28 MMOL/L (ref 20–33)
CREAT SERPL-MCNC: 0.46 MG/DL (ref 0.5–1.4)
EOSINOPHIL # BLD AUTO: 0.03 K/UL (ref 0–0.51)
EOSINOPHIL NFR BLD: 0.8 % (ref 0–6.9)
ERYTHROCYTE [DISTWIDTH] IN BLOOD BY AUTOMATED COUNT: 45.1 FL (ref 35.9–50)
EST. AVERAGE GLUCOSE BLD GHB EST-MCNC: 103 MG/DL
GLOBULIN SER CALC-MCNC: 3.2 G/DL (ref 1.9–3.5)
GLUCOSE SERPL-MCNC: 90 MG/DL (ref 65–99)
HBA1C MFR BLD: 5.2 % (ref 0–5.6)
HCT VFR BLD AUTO: 41 % (ref 37–47)
HDLC SERPL-MCNC: 79 MG/DL
HGB BLD-MCNC: 13.8 G/DL (ref 12–16)
IMM GRANULOCYTES # BLD AUTO: 0 K/UL (ref 0–0.11)
IMM GRANULOCYTES NFR BLD AUTO: 0 % (ref 0–0.9)
LDLC SERPL CALC-MCNC: 115 MG/DL
LYMPHOCYTES # BLD AUTO: 0.93 K/UL (ref 1–4.8)
LYMPHOCYTES NFR BLD: 24.3 % (ref 22–41)
MCH RBC QN AUTO: 30.5 PG (ref 27–33)
MCHC RBC AUTO-ENTMCNC: 33.7 G/DL (ref 33.6–35)
MCV RBC AUTO: 90.5 FL (ref 81.4–97.8)
MONOCYTES # BLD AUTO: 0.29 K/UL (ref 0–0.85)
MONOCYTES NFR BLD AUTO: 7.6 % (ref 0–13.4)
NEUTROPHILS # BLD AUTO: 2.53 K/UL (ref 2–7.15)
NEUTROPHILS NFR BLD: 66 % (ref 44–72)
NRBC # BLD AUTO: 0 K/UL
NRBC BLD-RTO: 0 /100 WBC
PLATELET # BLD AUTO: 405 K/UL (ref 164–446)
PMV BLD AUTO: 9 FL (ref 9–12.9)
POTASSIUM SERPL-SCNC: 3.6 MMOL/L (ref 3.6–5.5)
PROT SERPL-MCNC: 7.5 G/DL (ref 6–8.2)
PTH-INTACT SERPL-MCNC: 60.9 PG/ML (ref 14–72)
RBC # BLD AUTO: 4.53 M/UL (ref 4.2–5.4)
SODIUM SERPL-SCNC: 128 MMOL/L (ref 135–145)
TRIGL SERPL-MCNC: 68 MG/DL (ref 0–149)
WBC # BLD AUTO: 3.8 K/UL (ref 4.8–10.8)

## 2020-10-24 PROCEDURE — 85025 COMPLETE CBC W/AUTO DIFF WBC: CPT

## 2020-10-24 PROCEDURE — 82330 ASSAY OF CALCIUM: CPT

## 2020-10-24 PROCEDURE — 80053 COMPREHEN METABOLIC PANEL: CPT

## 2020-10-24 PROCEDURE — 36415 COLL VENOUS BLD VENIPUNCTURE: CPT

## 2020-10-24 PROCEDURE — 82306 VITAMIN D 25 HYDROXY: CPT | Mod: 91

## 2020-10-24 PROCEDURE — 83970 ASSAY OF PARATHORMONE: CPT

## 2020-10-24 PROCEDURE — 80061 LIPID PANEL: CPT

## 2020-10-24 PROCEDURE — 82310 ASSAY OF CALCIUM: CPT | Mod: XU

## 2020-10-24 PROCEDURE — 82306 VITAMIN D 25 HYDROXY: CPT

## 2020-10-24 PROCEDURE — 83036 HEMOGLOBIN GLYCOSYLATED A1C: CPT | Mod: GA

## 2020-10-26 DIAGNOSIS — M81.0 AGE-RELATED OSTEOPOROSIS WITHOUT CURRENT PATHOLOGICAL FRACTURE: ICD-10-CM

## 2020-11-02 RX ORDER — THIAMINE HCL 100 MG
1 TABLET ORAL DAILY
COMMUNITY
End: 2023-02-08

## 2020-11-02 RX ORDER — ASCORBIC ACID 500 MG
500 TABLET ORAL DAILY
COMMUNITY
End: 2023-02-08

## 2020-11-02 RX ORDER — TERIPARATIDE 250 UG/ML
INJECTION, SOLUTION SUBCUTANEOUS
COMMUNITY
Start: 2020-10-23 | End: 2021-06-11

## 2020-11-02 SDOH — HEALTH STABILITY: MENTAL HEALTH: HOW OFTEN DO YOU HAVE A DRINK CONTAINING ALCOHOL?: 4 OR MORE TIMES A WEEK

## 2020-11-02 SDOH — HEALTH STABILITY: MENTAL HEALTH: HOW OFTEN DO YOU HAVE 6 OR MORE DRINKS ON ONE OCCASION?: NEVER

## 2020-11-02 SDOH — HEALTH STABILITY: MENTAL HEALTH: HOW MANY STANDARD DRINKS CONTAINING ALCOHOL DO YOU HAVE ON A TYPICAL DAY?: 1 OR 2

## 2020-11-02 NOTE — PROGRESS NOTES
ANNUAL WELLNESS VISIT PRE-VISIT PLANNING WITHOUT OUTREACH    1.  Reviewed note from last office visit with PCP: YES    2.  If any orders were placed at last visit, do we have Results/Consult Notes?        •  Labs - Labs ordered, completed on 10/24/2020 and results are in chart.  Note: If patient appointment is for lab review and patient did not complete labs, check with provider if OK to reschedule patient until labs completed.       •  Imaging - Imaging ordered, NOT completed. Patient advised to complete prior to next appointment.       •  Referrals - No referrals were ordered at last office visit.    3.  Immunizations were updated in Epic using WebIZ?: Epic matches WebIZ       •  WebIZ Recommendations: FLU        •  Is patient due for Tdap? NO       •  Is patient due for Shingrix? NO     4.  Patient is due for the following Health Maintenance Topics:   Health Maintenance Due   Topic Date Due   • IMM INFLUENZA (1) 09/01/2020   • MAMMOGRAM  10/03/2020     5.  Reviewed/Updated the following with patient:       •   Preferred Pharmacy? YES       •   Preferred Lab? YES       •   Preferred Communication? YES       •   Allergies? YES       •   Medications? YES. Was Abstract Encounter opened and chart updated? YES       •   Social History? YES. Was Abstract Encounter opened and chart updated? YES       •   Family History (document living status of immediate family members and if + hx of  cancer, diabetes, hypertension, hyperlipidemia, heart attack, stroke) YES. Was Abstract Encounter opened and chart updated? YES    6.  Care Team Updated:       •   DME Company (gait device, O2, CPAP, etc.): NO       •   Other Specialists (eye doctor, derm, GYN, cardiology, endo, etc): YES    7. Orders for overdue Health Maintenance topics pended in Pre-Charting? YES    8.  Patient has the following Care Path diagnoses on Problem List:  NONE    9.  Patient was advised: “This is a free wellness visit. The provider will screen for medical  conditions to help you stay healthy. If you have other concerns to address you may be asked to discuss these at a separate visit or there may be an additional fee.”     10.  Patient was informed to arrive 15 min prior to their scheduled appointment and bring in their medication bottles.

## 2020-11-03 ENCOUNTER — OFFICE VISIT (OUTPATIENT)
Dept: MEDICAL GROUP | Age: 70
End: 2020-11-03
Payer: MEDICARE

## 2020-11-03 VITALS
BODY MASS INDEX: 21.16 KG/M2 | WEIGHT: 115 LBS | OXYGEN SATURATION: 99 % | DIASTOLIC BLOOD PRESSURE: 70 MMHG | RESPIRATION RATE: 18 BRPM | HEART RATE: 66 BPM | SYSTOLIC BLOOD PRESSURE: 120 MMHG | TEMPERATURE: 97.8 F | HEIGHT: 62 IN

## 2020-11-03 DIAGNOSIS — M81.0 AGE-RELATED OSTEOPOROSIS WITHOUT CURRENT PATHOLOGICAL FRACTURE: ICD-10-CM

## 2020-11-03 DIAGNOSIS — E78.00 HYPERCHOLESTEROLEMIA: ICD-10-CM

## 2020-11-03 DIAGNOSIS — Z00.00 HEALTH CARE MAINTENANCE: ICD-10-CM

## 2020-11-03 DIAGNOSIS — D70.9 NEUTROPENIA, UNSPECIFIED TYPE (HCC): ICD-10-CM

## 2020-11-03 DIAGNOSIS — I10 ESSENTIAL HYPERTENSION: ICD-10-CM

## 2020-11-03 DIAGNOSIS — Z12.31 ENCOUNTER FOR SCREENING MAMMOGRAM FOR MALIGNANT NEOPLASM OF BREAST: ICD-10-CM

## 2020-11-03 DIAGNOSIS — E55.9 HYPOVITAMINOSIS D: ICD-10-CM

## 2020-11-03 DIAGNOSIS — K76.89 BENIGN LIVER CYST: ICD-10-CM

## 2020-11-03 DIAGNOSIS — Z23 NEED FOR VACCINATION: ICD-10-CM

## 2020-11-03 DIAGNOSIS — Z00.00 MEDICARE ANNUAL WELLNESS VISIT, INITIAL: ICD-10-CM

## 2020-11-03 DIAGNOSIS — E87.1 HYPONATREMIA: ICD-10-CM

## 2020-11-03 PROBLEM — D25.9 UTERINE LEIOMYOMA: Status: RESOLVED | Noted: 2017-01-03 | Resolved: 2020-11-03

## 2020-11-03 PROBLEM — B35.1 ONYCHOMYCOSIS: Status: RESOLVED | Noted: 2019-04-25 | Resolved: 2020-11-03

## 2020-11-03 PROBLEM — R91.8 PULMONARY NODULES: Status: RESOLVED | Noted: 2019-07-16 | Resolved: 2020-11-03

## 2020-11-03 PROCEDURE — 90662 IIV NO PRSV INCREASED AG IM: CPT | Performed by: INTERNAL MEDICINE

## 2020-11-03 PROCEDURE — G0008 ADMIN INFLUENZA VIRUS VAC: HCPCS | Performed by: INTERNAL MEDICINE

## 2020-11-03 PROCEDURE — G0402 INITIAL PREVENTIVE EXAM: HCPCS | Mod: 25 | Performed by: INTERNAL MEDICINE

## 2020-11-03 RX ORDER — LOSARTAN POTASSIUM 100 MG/1
100 TABLET ORAL DAILY
Qty: 90 TAB | Refills: 3 | Status: SHIPPED | OUTPATIENT
Start: 2020-11-03 | End: 2021-10-21

## 2020-11-03 RX ORDER — HYDROCHLOROTHIAZIDE 25 MG/1
25 TABLET ORAL DAILY
Qty: 90 TAB | Refills: 3 | Status: ON HOLD | OUTPATIENT
Start: 2020-11-03 | End: 2021-06-12

## 2020-11-03 ASSESSMENT — FIBROSIS 4 INDEX: FIB4 SCORE: 0.73

## 2020-11-03 ASSESSMENT — PATIENT HEALTH QUESTIONNAIRE - PHQ9: CLINICAL INTERPRETATION OF PHQ2 SCORE: 0

## 2020-11-03 ASSESSMENT — ENCOUNTER SYMPTOMS: GENERAL WELL-BEING: GOOD

## 2020-11-03 ASSESSMENT — ACTIVITIES OF DAILY LIVING (ADL): BATHING_REQUIRES_ASSISTANCE: 1

## 2020-11-03 NOTE — PROGRESS NOTES
Chief Complaint   Patient presents with   • Annual Exam   • Results, HTN     labs   • Immunizations     requesting flu shot     HPI:  Alysa Sarabia is a 70 y.o. here for Medicare Annual Wellness Visit    Hypertension, chronic hyponatremia   Meds: Losartan 100 mg QD, taking as prescribed.    Measuring BP at home: no  No headaches, vision problems, tinnitus.  No chest pain/pressure, palpitations, irregular heart beats, exertional dyspnea, peripheral edema.  Diet: healthy Low salt diet: no  Exercise: yes  BMI: 21     Hypercholesterolemia  The patient had borderline high cholesterol, with high HDL; no medications.  Diet / Exercise/BMI:  As above  FH: neg     IFG  The patient had elevated FBG.  No polydipsia, polyphagia, polyuria.  No abdominal pain, weight loss, fatigue.  Diet / Exercise/BMI:  As above  FH of DM: neg     Hypovitaminosis D  The patient had low vitamin D level.  Vitamin D supplement: 2000 U per day OTC     Neutropenia  The patient was found to have slight leukopenia/neutropenia, with a decreased neutrophil count.    She has not had frequent infections.   Stable.    Osteoporosis  Dexa scan 2/11/2020  The mean bone mineral density for the lumbar spine is 0.724 g/cm2, which corresponds to a T score of -3.7 and a Z score of -1.5.  The proximal left femur has a mean bone mineral density of 0.674 g/cm2, with a T score of -2.7 and a Z score of -1.0.  Vit D/Ca supplement: advised  Exercise: advised QD  Nonsmoker.   BMI: Body mass index is 21.03 kg/m².  FH f osteoporosis: unknown  Tx: was on boniva and forteo, f/u by specialist in [facility]OV    Patient Active Problem List    Diagnosis Date Noted   • Age-related osteoporosis without current pathological fracture 10/26/2020   • Hypovitaminosis D 06/18/2020   • Neutropenia (HCC) 11/04/2019   • Hyponatremia 11/04/2019   • Health care maintenance 04/25/2019   • Hypercholesterolemia 04/25/2019   • Benign liver cyst 07/24/2017   • Essential hypertension 09/03/2015      Current Outpatient Medications   Medication Sig Dispense Refill   • hydroCHLOROthiazide (HYDRODIURIL) 25 MG Tab Take 1 Tab by mouth every day. 90 Tab 3   • losartan (COZAAR) 100 MG Tab Take 1 Tab by mouth every day. 90 Tab 3   • Multiple Vitamins-Minerals (PRESERVISION AREDS PO) Take  by mouth.     • Cholecalciferol (VITAMIN D3) 125 MCG (5000 UT) Tab Take  by mouth.     • Cyanocobalamin (VITAMIN B-12) 2500 MCG SL Tab Place  under tongue.     • ascorbic acid (ASCORBIC ACID) 500 MG Tab Take 500 mg by mouth every day.     • B Complex Vitamins (VITAMIN-B COMPLEX PO) Take  by mouth.     • CALCIUM PO Take  by mouth every day.     • LUTEIN PO Take  by mouth 2 Times a Day.     • multivitamin (THERAGRAN) Tab Take 1 Tab by mouth every day.     • FORTEO 600 MCG/2.4ML Solution Pen-injector        No current facility-administered medications for this visit.         Patient is taking medications as noted in medication list.  Current supplements as per medication list.     Allergies: Diltiazem    Current social contact/activities: Weights, stretching, including walking five miles daily. Reading, watch television, house hold chores, visits with family and friends during global pandemic COVID-19.    Is patient current with immunizations? No, due for FLU. Patient is interested in receiving FLU today.    She  reports that she has never smoked. She has never used smokeless tobacco. She reports previous alcohol use of about 7.2 oz of alcohol per week. She reports that she does not use drugs.  Counseling given: Not Answered    DPA/Advanced directive: Patient does not have an Advanced Directive.  A packet and workshop information was given on Advanced Directives.    ROS:    Gait: Uses no assistive device   Ostomy: No   Other tubes: No   Amputations: No   Chronic oxygen use No   Last eye exam: Year 2020  Wears hearing aids: No   : Denies any urinary leakage during the last 6 months    Screening:    Depression Screening  Little  interest or pleasure in doing things?  0 - not at all  Feeling down, depressed, or hopeless? 0 - not at all  Patient Health Questionnaire Score: 0    Screening for Cognitive Impairment  Three Minute Recall (river, nation, finger)  3/3 River, Nation, Finger  Miki clock face with all 12 numbers and set the hands to show 10 past 11.  Yes 5/5  If cognitive concerns identified, deferred for follow up unless specifically addressed in assessment and plan.    Fall Risk Assessment  Has the patient had two or more falls in the last year or any fall with injury in the last year?  No  If fall risk identified, deferred for follow up unless specifically addressed in assessment and plan.    Safety Assessment  Throw rugs on floor.  Yes  Handrails on all stairs.  Yes  Good lighting in all hallways.  Yes  Difficulty hearing.  No  Patient counseled about all safety risks that were identified.    Functional Assessment ADLs  Are there any barriers preventing you from cooking for yourself or meeting nutritional needs?  Yes.    Are there any barriers preventing you from driving safely or obtaining transportation?  Yes.    Are there any barriers preventing you from using a telephone or calling for help?  Yes.    Are there any barriers preventing you from shopping?  Yes.    Are there any barriers preventing you from taking care of your own finances?  Yes.    Are there any barriers preventing you from managing your medications?  Yes.    Are there any barriers preventing you from showering, bathing or dressing yourself?  Yes.    Are you currently engaging in any exercise or physical activity?  Yes.  Weights &stretching, including walking five miles daily  What is your perception of your health?  Good.    Health Maintenance Summary                IMM INFLUENZA Overdue 9/1/2020      Done 10/5/2019 Imm Admin: Influenza Vaccine Adult HD     Patient has more history with this topic...    MAMMOGRAM Overdue 10/3/2020      Done 10/3/2019 MA-SCREENING  MAMMO BILAT W/TOMOSYNTHESIS W/CAD     Patient has more history with this topic...    IMM PNEUMOCOCCAL VACCINE: 65+ Years Next Due 12/15/2021      Done 9/29/2017 Imm Admin: Pneumococcal Conjugate Vaccine (Prevnar/PCV-13)     Patient has more history with this topic...    COLONOSCOPY Next Due 12/15/2023      Done 12/15/2013 AMB REFERRAL TO GI FOR COLONOSCOPY    IMM DTaP/Tdap/Td Vaccine Next Due 7/31/2024      Done 7/31/2014 Imm Admin: Tdap Vaccine     Patient has more history with this topic...    BONE DENSITY Next Due 2/11/2025      Done 2/11/2020 DS-BONE DENSITY STUDY (DEXA)        Patient Care Team:  Darvin Brennan M.D. as PCP - General (Family Medicine)  Dr. Regan Rico, OD as Attending Team Physician (Ophthalmology)    Social History     Tobacco Use   • Smoking status: Never Smoker   • Smokeless tobacco: Never Used   Substance Use Topics   • Alcohol use: Not Currently     Alcohol/week: 7.2 oz     Types: 5 Glasses of wine, 7 Standard drinks or equivalent per week     Frequency: 4 or more times a week     Drinks per session: 1 or 2     Binge frequency: Never     Comment: 4 per week   • Drug use: No     Family History   Problem Relation Age of Onset   • Hyperlipidemia Mother    • Hypertension Mother    • Heart Disease Father    • Cancer Brother         neck   • Cancer Maternal Grandmother    • No Known Problems Maternal Grandfather    • No Known Problems Paternal Grandmother    • No Known Problems Paternal Grandfather    • Alcohol/Drug Neg Hx    • Allergies Neg Hx    • Diabetes Neg Hx    • Psychiatric Illness Neg Hx    • Stroke Neg Hx    • Thyroid Neg Hx      She  has a past medical history of Hydronephrosis, Hypertension, Hyponatremia, Leukopenia, Liver cyst, Small bowel obstruction (HCC), and Varicose vein of leg.   Past Surgical History:   Procedure Laterality Date   • EXPLORATORY LAPAROTOMY  12/14/2016    Procedure: EXPLORATORY LAPAROTOMY, EXTENSIVE ADHESIOLYSIS, SMALL BOWEL ANASTOMOSIS;  Surgeon:  "Mac Reddy M.D.;  Location: SURGERY [facility];  Service:    • CYSTOSCOPY STENT PLACEMENT Right 5/13/2016    Procedure: CYSTOSCOPY STENT PLACEMENT;  Surgeon: Fabrizio Sun M.D.;  Location: SURGERY [facility];  Service:    • RETROGRADES  5/13/2016    Procedure: RETROGRADES;  Surgeon: Fabrizio Sun M.D.;  Location: SURGERY [facility];  Service:    • EXPLORATORY LAPAROTOMY  5/3/2016    Procedure: EXPLORATORY LAPAROTOMY- Bowel obstruction;  Surgeon: Mac Reddy M.D.;  Location: SURGERY [facility];  Service:    • COLON RESECTION LAPAROSCOPIC  3/27/2014    Performed by Mac Reddy M.D. at [facility]   • BREAST BIOPSY  2007   • LUMPECTOMY Left 2005    benign     Exam:   /70 (BP Location: Right arm, Patient Position: Sitting, BP Cuff Size: Adult)   Pulse 66   Temp 36.6 °C (97.8 °F) (Temporal)   Resp 18   Ht 1.575 m (5' 2\")   Wt 52.2 kg (115 lb)   SpO2 99%  Body mass index is 21.03 kg/m².  Hearing good.    Dentition fair  Alert, oriented in no acute distress.  Eye contact is good, speech goal directed, affect calm    Labs  Reviewed and discussed  Lab Results   Component Value Date/Time    CHOLSTRLTOT 208 (H) 10/24/2020 09:37 AM     (H) 10/24/2020 09:37 AM    HDL 79 10/24/2020 09:37 AM    TRIGLYCERIDE 68 10/24/2020 09:37 AM       Lab Results   Component Value Date/Time    SODIUM 128 (L) 10/24/2020 09:37 AM    POTASSIUM 3.6 10/24/2020 09:37 AM    CHLORIDE 90 (L) 10/24/2020 09:37 AM    CO2 28 10/24/2020 09:37 AM    GLUCOSE 90 10/24/2020 09:37 AM    BUN 11 10/24/2020 09:37 AM    CREATININE 0.46 (L) 10/24/2020 09:37 AM    CREATININE 0.6 05/02/2007 09:18 AM     Lab Results   Component Value Date/Time    ALKPHOSPHAT 93 10/24/2020 09:37 AM    ASTSGOT 19 10/24/2020 09:37 AM    ALTSGPT 19 10/24/2020 09:37 AM    TBILIRUBIN 0.7 10/24/2020 09:37 AM      Lab Results   Component Value Date/Time    HBA1C 5.2 10/24/2020 09:37 AM     No results found for: TSH  No results found " for: FREET4  Lab Results   Component Value Date/Time    WBC 3.8 (L) 10/24/2020 09:37 AM    RBC 4.53 10/24/2020 09:37 AM    HEMOGLOBIN 13.8 10/24/2020 09:37 AM    HEMATOCRIT 41.0 10/24/2020 09:37 AM    MCV 90.5 10/24/2020 09:37 AM    MCH 30.5 10/24/2020 09:37 AM    MCHC 33.7 10/24/2020 09:37 AM    MPV 9.0 10/24/2020 09:37 AM    NEUTSPOLYS 66.00 10/24/2020 09:37 AM    LYMPHOCYTES 24.30 10/24/2020 09:37 AM    MONOCYTES 7.60 10/24/2020 09:37 AM    EOSINOPHILS 0.80 10/24/2020 09:37 AM    BASOPHILS 1.30 10/24/2020 09:37 AM    ANISOCYTOSIS 1+ 05/05/2016 04:10 AM      Imaging  Reviewed and discussed per HPI    Assessment and Plan.     1. Medicare annual wellness visit, initial  Reviewed PMH, PSH, FH, SH, ALL, MEDS, HCM/IMM.   - Initial Annual Wellness Visit - Includes PPPS ()    2. Health care maintenance  Per HPI  - Initial Annual Wellness Visit - Includes PPPS ()    3. Need for vaccination  Information was provided to the patient regarding the vaccine, including side effects. Vaccine was given by my medical assistant under my supervision.  - Influenza Vaccine, High Dose (65+ Only)  - Initial Annual Wellness Visit - Includes PPPS ()    4. Encounter for screening mammogram for malignant neoplasm of breast  - MA-SCREENING MAMMO BILAT W/CAD; Future  - Initial Annual Wellness Visit - Includes PPPS ()    5. Essential hypertension  Controlled, continue with current treatment.  - Initial Annual Wellness Visit - Includes PPPS ()  - Comp Metabolic Panel; Future  - hydroCHLOROthiazide (HYDRODIURIL) 25 MG Tab; Take 1 Tab by mouth every day.  Dispense: 90 Tab; Refill: 3  - losartan (COZAAR) 100 MG Tab; Take 1 Tab by mouth every day.  Dispense: 90 Tab; Refill: 3    6. Hyponatremia  Stable, advised to increase salt intake.  - Initial Annual Wellness Visit - Includes PPPS ()  - Comp Metabolic Panel; Future    7. Hypercholesterolemia  Controlled, continue with current treatment.  - Initial Annual Wellness  Visit - Includes PPPS ()    8. Hypovitaminosis D  Controlled, continue with current treatment.  - Initial Annual Wellness Visit - Includes PPPS ()    9. Neutropenia, unspecified type (HCC)  Borderline, f/u labs  - Initial Annual Wellness Visit - Includes PPPS ()  - CBC WITH DIFFERENTIAL; Future    10. Age-related osteoporosis without current pathological fracture  She is considering to continue forteo in 2021 after she changes insurance  - Initial Annual Wellness Visit - Includes PPPS ()    11. Benign liver cyst  Reviewed imaging  - Initial Annual Wellness Visit - Includes PPPS ()    Services suggested: No services needed at this time  Health Care Screening recommendations as per orders if indicated.  Referrals offered: PT/OT/Nutrition counseling/Behavioral Health/Smoking cessation as per orders if indicated.    Discussion today about general wellness and lifestyle habits:    · Prevent falls and reduce trip hazards; Cautioned about securing or removing rugs.  · Have a working fire alarm and carbon monoxide detector;   · Engage in regular physical activity and social activities     Follow-up: in 6 months, labs    I attest that I saw this patient on this date and due to technical issues am not showing as the author of the note.

## 2020-11-30 ENCOUNTER — HOSPITAL ENCOUNTER (OUTPATIENT)
Dept: RADIOLOGY | Facility: MEDICAL CENTER | Age: 70
End: 2020-11-30
Attending: OBSTETRICS & GYNECOLOGY
Payer: MEDICARE

## 2020-11-30 DIAGNOSIS — Z12.31 VISIT FOR SCREENING MAMMOGRAM: ICD-10-CM

## 2020-11-30 PROCEDURE — 77067 SCR MAMMO BI INCL CAD: CPT

## 2021-03-03 DIAGNOSIS — Z23 NEED FOR VACCINATION: ICD-10-CM

## 2021-04-22 ENCOUNTER — PATIENT MESSAGE (OUTPATIENT)
Dept: HEALTH INFORMATION MANAGEMENT | Facility: OTHER | Age: 71
End: 2021-04-22

## 2021-05-01 ENCOUNTER — HOSPITAL ENCOUNTER (OUTPATIENT)
Dept: LAB | Facility: MEDICAL CENTER | Age: 71
End: 2021-05-01
Attending: INTERNAL MEDICINE
Payer: MEDICARE

## 2021-05-01 DIAGNOSIS — D70.9 NEUTROPENIA, UNSPECIFIED TYPE (HCC): ICD-10-CM

## 2021-05-01 DIAGNOSIS — E87.1 HYPONATREMIA: ICD-10-CM

## 2021-05-01 DIAGNOSIS — I10 ESSENTIAL HYPERTENSION: ICD-10-CM

## 2021-05-01 LAB
ALBUMIN SERPL BCP-MCNC: 4.2 G/DL (ref 3.2–4.9)
ALBUMIN/GLOB SERPL: 1.2 G/DL
ALP SERPL-CCNC: 108 U/L (ref 30–99)
ALT SERPL-CCNC: 18 U/L (ref 2–50)
ANION GAP SERPL CALC-SCNC: 10 MMOL/L (ref 7–16)
AST SERPL-CCNC: 10 U/L (ref 12–45)
BASOPHILS # BLD AUTO: 1.1 % (ref 0–1.8)
BASOPHILS # BLD: 0.04 K/UL (ref 0–0.12)
BILIRUB SERPL-MCNC: 0.9 MG/DL (ref 0.1–1.5)
BUN SERPL-MCNC: 10 MG/DL (ref 8–22)
CALCIUM SERPL-MCNC: 9.2 MG/DL (ref 8.5–10.5)
CHLORIDE SERPL-SCNC: 92 MMOL/L (ref 96–112)
CO2 SERPL-SCNC: 27 MMOL/L (ref 20–33)
CREAT SERPL-MCNC: 0.44 MG/DL (ref 0.5–1.4)
EOSINOPHIL # BLD AUTO: 0.06 K/UL (ref 0–0.51)
EOSINOPHIL NFR BLD: 1.6 % (ref 0–6.9)
ERYTHROCYTE [DISTWIDTH] IN BLOOD BY AUTOMATED COUNT: 42.1 FL (ref 35.9–50)
GLOBULIN SER CALC-MCNC: 3.4 G/DL (ref 1.9–3.5)
GLUCOSE SERPL-MCNC: 87 MG/DL (ref 65–99)
HCT VFR BLD AUTO: 44 % (ref 37–47)
HGB BLD-MCNC: 15.1 G/DL (ref 12–16)
IMM GRANULOCYTES # BLD AUTO: 0.01 K/UL (ref 0–0.11)
IMM GRANULOCYTES NFR BLD AUTO: 0.3 % (ref 0–0.9)
LYMPHOCYTES # BLD AUTO: 1.16 K/UL (ref 1–4.8)
LYMPHOCYTES NFR BLD: 31.8 % (ref 22–41)
MCH RBC QN AUTO: 31.3 PG (ref 27–33)
MCHC RBC AUTO-ENTMCNC: 34.3 G/DL (ref 33.6–35)
MCV RBC AUTO: 91.1 FL (ref 81.4–97.8)
MONOCYTES # BLD AUTO: 0.29 K/UL (ref 0–0.85)
MONOCYTES NFR BLD AUTO: 7.9 % (ref 0–13.4)
NEUTROPHILS # BLD AUTO: 2.09 K/UL (ref 2–7.15)
NEUTROPHILS NFR BLD: 57.3 % (ref 44–72)
NRBC # BLD AUTO: 0 K/UL
NRBC BLD-RTO: 0 /100 WBC
PLATELET # BLD AUTO: 364 K/UL (ref 164–446)
PMV BLD AUTO: 9.4 FL (ref 9–12.9)
POTASSIUM SERPL-SCNC: 3.9 MMOL/L (ref 3.6–5.5)
PROT SERPL-MCNC: 7.6 G/DL (ref 6–8.2)
RBC # BLD AUTO: 4.83 M/UL (ref 4.2–5.4)
SODIUM SERPL-SCNC: 129 MMOL/L (ref 135–145)
WBC # BLD AUTO: 3.7 K/UL (ref 4.8–10.8)

## 2021-05-01 PROCEDURE — 36415 COLL VENOUS BLD VENIPUNCTURE: CPT

## 2021-05-01 PROCEDURE — 80053 COMPREHEN METABOLIC PANEL: CPT

## 2021-05-01 PROCEDURE — 85025 COMPLETE CBC W/AUTO DIFF WBC: CPT

## 2021-05-03 ENCOUNTER — PATIENT OUTREACH (OUTPATIENT)
Dept: HEALTH INFORMATION MANAGEMENT | Facility: OTHER | Age: 71
End: 2021-05-03

## 2021-05-03 ENCOUNTER — PATIENT MESSAGE (OUTPATIENT)
Dept: HEALTH INFORMATION MANAGEMENT | Facility: OTHER | Age: 71
End: 2021-05-03

## 2021-05-03 ENCOUNTER — TELEPHONE (OUTPATIENT)
Dept: MEDICAL GROUP | Age: 71
End: 2021-05-03

## 2021-05-03 NOTE — TELEPHONE ENCOUNTER
ESTABLISHED PATIENT PRE-VISIT PLANNING   Monday, 05/03/21@2:04PM:    Patient was NOT contacted to complete PVP.     Note: Patient will not be contacted if there is no indication to call.     1.  Reviewed notes from the last few office visits within the medical group: Yes    2.  If any orders were placed at last visit or intended to be done for this visit (i.e. 6 mos follow-up), do we have Results/Consult Notes?         •  Labs - Labs ordered, completed on 05/01/2021 and results are in chart.  Note: If patient appointment is for lab review and patient did not complete labs, check with provider if OK to reschedule patient until labs completed.       •  Imaging - Imaging ordered, completed and results are in chart.       •  Referrals - No referrals were ordered at last office visit.    3. Is this appointment scheduled as a Hospital Follow-Up? No    4.  Immunizations were updated in Epic using Reconcile Outside Information activity? Yes    5.  Patient is due for the following Health Maintenance Topics:   There are no preventive care reminders to display for this patient.    6.  AHA (Pulse8) form printed for Provider? N/A

## 2021-05-03 NOTE — PROGRESS NOTES
Scheduled Comprehensive Health Assessment. Scheduled with Ashley at [address] at 3:00pm. Verified HIPPA, patient had no questions or concerns. Patient received Noomt notification. Covid vaccine completed. Reminder sent.    Attempt 2

## 2021-05-04 ENCOUNTER — TELEMEDICINE (OUTPATIENT)
Dept: MEDICAL GROUP | Age: 71
End: 2021-05-04
Payer: MEDICARE

## 2021-05-04 VITALS
HEIGHT: 62 IN | HEART RATE: 73 BPM | WEIGHT: 110 LBS | TEMPERATURE: 97 F | BODY MASS INDEX: 20.24 KG/M2 | OXYGEN SATURATION: 95 %

## 2021-05-04 DIAGNOSIS — E55.9 HYPOVITAMINOSIS D: ICD-10-CM

## 2021-05-04 DIAGNOSIS — E78.00 HYPERCHOLESTEROLEMIA: ICD-10-CM

## 2021-05-04 DIAGNOSIS — R06.02 SOB (SHORTNESS OF BREATH): ICD-10-CM

## 2021-05-04 DIAGNOSIS — I10 ESSENTIAL HYPERTENSION: ICD-10-CM

## 2021-05-04 DIAGNOSIS — D70.9 NEUTROPENIA, UNSPECIFIED TYPE (HCC): ICD-10-CM

## 2021-05-04 DIAGNOSIS — E87.1 HYPONATREMIA: ICD-10-CM

## 2021-05-04 DIAGNOSIS — R73.01 IFG (IMPAIRED FASTING GLUCOSE): ICD-10-CM

## 2021-05-04 PROCEDURE — 99214 OFFICE O/P EST MOD 30 MIN: CPT | Mod: 95 | Performed by: INTERNAL MEDICINE

## 2021-05-04 RX ORDER — SODIUM CHLORIDE 1 G/1
1 TABLET ORAL 3 TIMES DAILY
Qty: 270 TABLET | Refills: 3 | Status: SHIPPED | OUTPATIENT
Start: 2021-05-04 | End: 2022-01-18

## 2021-05-04 ASSESSMENT — FIBROSIS 4 INDEX: FIB4 SCORE: 0.44

## 2021-05-04 ASSESSMENT — PATIENT HEALTH QUESTIONNAIRE - PHQ9: CLINICAL INTERPRETATION OF PHQ2 SCORE: 0

## 2021-05-04 NOTE — PROGRESS NOTES
Telemedicine Visit: Established Patient     This Remote Face to Face encounter was conducted via Zoom. Given the importance of social distancing and other strategies recommended to reduce the risk of COVID-19 transmission, I am providing medical care to this patient via audio/video visit in place of an in person visit at the request of the patient. Verbal consent to telehealth, risks, benefits, and consequences were discussed. Patient retains the right to withdraw at any time. All existing confidentiality protections apply. The patient has access to all transmitted medical information. No dissemination of any patient images or information to other entities without further written consent.    CHIEF COMPLAINT     Chief Complaint   Patient presents with   • Lab Results   Hypertension    HPI  Alysa Sarabia is a 70 y.o. female who presents today for the following     Hypertension/SOB, chronic hyponatremia  C/o intermittent SOB.    Meds: Losartan 100 mg QD, taking as prescribed.    Measuring BP at home: no  No headaches, vision problems, tinnitus.  No chest pain/pressure, palpitations, irregular heart beats, exertional dyspnea, peripheral edema.  Diet: healthy Low salt diet: no  Exercise: yes  BMI: 21     CXR 5/3/2016  Negative  Hypercholesterolemia  The patient had borderline high cholesterol, with high HDL; no medications.  Diet / Exercise/BMI:  As above  FH: neg     IFG  The patient had elevated FBG.  No polydipsia, polyphagia, polyuria.  No abdominal pain, weight loss, fatigue.  Diet / Exercise/BMI:  As above  FH of DM: neg     Hypovitaminosis D  The patient had low vitamin D level, normalized  Vitamin D supplement: 2000 U per day OTC     Neutropenia  The patient was found to have slight leukopenia/neutropenia, with a decreased neutrophil count.    She has not had frequent infections.   Stable.    Reviewed PMH, PSH, FH, SH, ALL, HCM/IMM, no changes  Reviewed MEDS, no changes    Patient Active Problem List     Diagnosis Date Noted   • Age-related osteoporosis without current pathological fracture 10/26/2020   • Hypovitaminosis D 06/18/2020   • Neutropenia (HCC) 11/04/2019   • Hyponatremia 11/04/2019   • Health care maintenance 04/25/2019   • Hypercholesterolemia 04/25/2019   • Benign liver cyst 07/24/2017   • Essential hypertension 09/03/2015     CURRENT MEDICATIONS  Current Outpatient Medications   Medication Sig Dispense Refill   • hydroCHLOROthiazide (HYDRODIURIL) 25 MG Tab Take 1 Tab by mouth every day. 90 Tab 3   • losartan (COZAAR) 100 MG Tab Take 1 Tab by mouth every day. 90 Tab 3   • FORTEO 600 MCG/2.4ML Solution Pen-injector      • Multiple Vitamins-Minerals (PRESERVISION AREDS PO) Take  by mouth.     • Cholecalciferol (VITAMIN D3) 125 MCG (5000 UT) Tab Take  by mouth.     • Cyanocobalamin (VITAMIN B-12) 2500 MCG SL Tab Place  under tongue.     • ascorbic acid (ASCORBIC ACID) 500 MG Tab Take 500 mg by mouth every day.     • B Complex Vitamins (VITAMIN-B COMPLEX PO) Take  by mouth.     • CALCIUM PO Take  by mouth every day.     • LUTEIN PO Take  by mouth 2 Times a Day.     • multivitamin (THERAGRAN) Tab Take 1 Tab by mouth every day.       No current facility-administered medications for this visit.     ALLERGIES  Allergies: Diltiazem  PAST MEDICAL HISTORY  Past Medical History:   Diagnosis Date   • Hydronephrosis    • Hypertension    • Hyponatremia    • Leukopenia    • Liver cyst     benign, no f/u   • Small bowel obstruction (HCC)     R   • Varicose vein of leg      SURGICAL HISTORY  She  has a past surgical history that includes breast biopsy (2007); colon resection laparoscopic (3/27/2014); exploratory laparotomy (5/3/2016); cystoscopy stent placement (Right, 5/13/2016); retrogrades (5/13/2016); exploratory laparotomy (12/14/2016); and lumpectomy (Left, 2005).  SOCIAL HISTORY  Social History     Tobacco Use   • Smoking status: Never Smoker   • Smokeless tobacco: Never Used   Substance Use Topics   • Alcohol  "use: Not Currently     Alcohol/week: 7.2 oz     Types: 5 Glasses of wine, 7 Standard drinks or equivalent per week     Comment: 4 per week   • Drug use: No     Social History     Social History Narrative    Lives with .     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     FAMILY HISTORY  Family History   Problem Relation Age of Onset   • Hyperlipidemia Mother    • Hypertension Mother    • Heart Disease Father    • Cancer Brother         neck   • Cancer Maternal Grandmother    • No Known Problems Maternal Grandfather    • No Known Problems Paternal Grandmother    • No Known Problems Paternal Grandfather    • Alcohol/Drug Neg Hx    • Allergies Neg Hx    • Diabetes Neg Hx    • Psychiatric Illness Neg Hx    • Stroke Neg Hx    • Thyroid Neg Hx      Family Status   Relation Name Status   • Mo  Alive   • Fa  Alive   • Bro  (Not Specified)   • MGMo     • MGFa     • PGMo     • PGFa     • Neg Hx  (Not Specified)     ROS   Constitutional: Negative for fever, chills, fatigue.  HENT: Negative for congestion, sore throat.  Eyes: Negative for vision problems.   Respiratory: Negative for cough, shortness of breath.  Cardiovascular: Negative for chest pain, palpitations.   Gastrointestinal: Negative for heartburn, nausea, abdominal pain.   Genitourinary: Negative for dysuria.  Musculoskeletal: Negative for significant myalgia, back and joint pain.   Skin: Negative for rash.   Neuro: Negative for dizziness, weakness and headaches.   Endo/Heme/Allergies: Does not bruise/bleed easily.   Psychiatric/Behavioral: Negative for depression.    Objective   Vitals obtained by patient:  Pulse 73   Temperature 36.1 °C (97 °F) (Temporal)   Height 1.575 m (5' 2\")   Weight 49.9 kg (110 lb)   Oxygen Saturation 95%   Body Mass Index 20.12 kg/m²   Physical Exam:  Constitutional: Alert, no distress, well-groomed.  Skin: No rash in visible areas.  Eye: Round. Conjunctiva clear, lids " normal.  ENMT: Lips pink without lesions, good dentition. Phonation normal.  Neck: No visible masses or thyromegaly. Moves freely without pain.  CV: no peripheral cyanosis, tachycardia.  Respiratory: Unlabored respiratory effort, no cough or audible wheezing.  Psych: Alert and oriented x3, normal affect and mood.     Labs     Labs are reviewed and discussed with a patient  Lab Results   Component Value Date/Time    CHOLSTRLTOT 208 (H) 10/24/2020 09:37 AM     (H) 10/24/2020 09:37 AM    HDL 79 10/24/2020 09:37 AM    TRIGLYCERIDE 68 10/24/2020 09:37 AM       Lab Results   Component Value Date/Time    SODIUM 129 (L) 05/01/2021 08:05 AM    POTASSIUM 3.9 05/01/2021 08:05 AM    CHLORIDE 92 (L) 05/01/2021 08:05 AM    CO2 27 05/01/2021 08:05 AM    GLUCOSE 87 05/01/2021 08:05 AM    BUN 10 05/01/2021 08:05 AM    CREATININE 0.44 (L) 05/01/2021 08:05 AM    CREATININE 0.6 05/02/2007 09:18 AM     Lab Results   Component Value Date/Time    ALKPHOSPHAT 108 (H) 05/01/2021 08:05 AM    ASTSGOT 10 (L) 05/01/2021 08:05 AM    ALTSGPT 18 05/01/2021 08:05 AM    TBILIRUBIN 0.9 05/01/2021 08:05 AM      Lab Results   Component Value Date/Time    HBA1C 5.2 10/24/2020 09:37 AM     No results found for: TSH  No results found for: FREET4    Lab Results   Component Value Date/Time    WBC 3.7 (L) 05/01/2021 08:05 AM    RBC 4.83 05/01/2021 08:05 AM    HEMOGLOBIN 15.1 05/01/2021 08:05 AM    HEMATOCRIT 44.0 05/01/2021 08:05 AM    MCV 91.1 05/01/2021 08:05 AM    MCH 31.3 05/01/2021 08:05 AM    MCHC 34.3 05/01/2021 08:05 AM    MPV 9.4 05/01/2021 08:05 AM    NEUTSPOLYS 57.30 05/01/2021 08:05 AM    LYMPHOCYTES 31.80 05/01/2021 08:05 AM    MONOCYTES 7.90 05/01/2021 08:05 AM    EOSINOPHILS 1.60 05/01/2021 08:05 AM    BASOPHILS 1.10 05/01/2021 08:05 AM    ANISOCYTOSIS 1+ 05/05/2016 04:10 AM      Imaging      Reviewed and discussed per HPI    Assessment and Plan     Alysa Sarabia is a 70 y.o. female    1. Essential hypertension  - Controlled,  continue with current management.  - Comp Metabolic Panel; Standing  - DX-CHEST-2 VIEWS; Future    2. SOB (shortness of breath)  Pending  - DX-CHEST-2 VIEWS; Future    3. Hyponatremia, chronic  F/u labs  - Comp Metabolic Panel; Standing  - OSMOLALITY URINE; Future  - OSMOLALITY SERUM; Future  - URINE SODIUM RANDOM; Future  - DX-CHEST-2 VIEWS; Future    4. Hypercholesterolemia  - Controlled, continue with current management.  - Comp Metabolic Panel; Standing  - Lipid Profile; Standing    5. IFG (impaired fasting glucose)  Resolved    6. Hypovitaminosis D  Resolved    7. Neutropenia, unspecified type (HCC)  - Stable, f/u labs  - CBC WITH DIFFERENTIAL; Standing    Follow-up: in 6, annual and labs

## 2021-05-14 NOTE — PROGRESS NOTES
Outcome: called and canceled appointment for may 28 at 3 pm she will call back to reschedule     Please transfer to Patient Outreach Team at [phone] when patient returns call.        Attempt #1

## 2021-06-10 VITALS — DIASTOLIC BLOOD PRESSURE: 83 MMHG | SYSTOLIC BLOOD PRESSURE: 126 MMHG

## 2021-06-11 ENCOUNTER — APPOINTMENT (OUTPATIENT)
Dept: RADIOLOGY | Facility: MEDICAL CENTER | Age: 71
DRG: 149 | End: 2021-06-11
Attending: EMERGENCY MEDICINE
Payer: MEDICARE

## 2021-06-11 ENCOUNTER — HOSPITAL ENCOUNTER (INPATIENT)
Facility: MEDICAL CENTER | Age: 71
LOS: 1 days | DRG: 149 | End: 2021-06-12
Attending: EMERGENCY MEDICINE | Admitting: STUDENT IN AN ORGANIZED HEALTH CARE EDUCATION/TRAINING PROGRAM
Payer: MEDICARE

## 2021-06-11 DIAGNOSIS — R27.0 ATAXIA: ICD-10-CM

## 2021-06-11 DIAGNOSIS — E87.1 HYPONATREMIA: ICD-10-CM

## 2021-06-11 DIAGNOSIS — R42 VERTIGO: ICD-10-CM

## 2021-06-11 LAB
ALBUMIN SERPL BCP-MCNC: 4.3 G/DL (ref 3.2–4.9)
ALBUMIN/GLOB SERPL: 1.3 G/DL
ALP SERPL-CCNC: 92 U/L (ref 30–99)
ALT SERPL-CCNC: 22 U/L (ref 2–50)
ANION GAP SERPL CALC-SCNC: 12 MMOL/L (ref 7–16)
APPEARANCE UR: CLEAR
APTT PPP: 33.8 SEC (ref 24.7–36)
AST SERPL-CCNC: 31 U/L (ref 12–45)
BASOPHILS # BLD AUTO: 0.5 % (ref 0–1.8)
BASOPHILS # BLD: 0.03 K/UL (ref 0–0.12)
BILIRUB SERPL-MCNC: 0.8 MG/DL (ref 0.1–1.5)
BILIRUB UR QL STRIP.AUTO: NEGATIVE
BUN SERPL-MCNC: 10 MG/DL (ref 8–22)
CALCIUM SERPL-MCNC: 8.9 MG/DL (ref 8.5–10.5)
CHLORIDE SERPL-SCNC: 88 MMOL/L (ref 96–112)
CO2 SERPL-SCNC: 24 MMOL/L (ref 20–33)
COLOR UR: YELLOW
CREAT SERPL-MCNC: 0.38 MG/DL (ref 0.5–1.4)
EKG IMPRESSION: NORMAL
EOSINOPHIL # BLD AUTO: 0.01 K/UL (ref 0–0.51)
EOSINOPHIL NFR BLD: 0.2 % (ref 0–6.9)
ERYTHROCYTE [DISTWIDTH] IN BLOOD BY AUTOMATED COUNT: 42.9 FL (ref 35.9–50)
EST. AVERAGE GLUCOSE BLD GHB EST-MCNC: 100 MG/DL
GLOBULIN SER CALC-MCNC: 3.3 G/DL (ref 1.9–3.5)
GLUCOSE SERPL-MCNC: 113 MG/DL (ref 65–99)
GLUCOSE UR STRIP.AUTO-MCNC: NEGATIVE MG/DL
HBA1C MFR BLD: 5.1 % (ref 4–5.6)
HCT VFR BLD AUTO: 40.3 % (ref 37–47)
HGB BLD-MCNC: 14 G/DL (ref 12–16)
IMM GRANULOCYTES # BLD AUTO: 0.01 K/UL (ref 0–0.11)
IMM GRANULOCYTES NFR BLD AUTO: 0.2 % (ref 0–0.9)
INR PPP: 1.05 (ref 0.87–1.13)
KETONES UR STRIP.AUTO-MCNC: NEGATIVE MG/DL
LEUKOCYTE ESTERASE UR QL STRIP.AUTO: NEGATIVE
LYMPHOCYTES # BLD AUTO: 1.13 K/UL (ref 1–4.8)
LYMPHOCYTES NFR BLD: 20.1 % (ref 22–41)
MCH RBC QN AUTO: 31.2 PG (ref 27–33)
MCHC RBC AUTO-ENTMCNC: 34.7 G/DL (ref 33.6–35)
MCV RBC AUTO: 89.8 FL (ref 81.4–97.8)
MICRO URNS: NORMAL
MONOCYTES # BLD AUTO: 0.42 K/UL (ref 0–0.85)
MONOCYTES NFR BLD AUTO: 7.5 % (ref 0–13.4)
NEUTROPHILS # BLD AUTO: 4.01 K/UL (ref 2–7.15)
NEUTROPHILS NFR BLD: 71.5 % (ref 44–72)
NITRITE UR QL STRIP.AUTO: NEGATIVE
NRBC # BLD AUTO: 0 K/UL
NRBC BLD-RTO: 0 /100 WBC
PH UR STRIP.AUTO: 8 [PH] (ref 5–8)
PLATELET # BLD AUTO: 343 K/UL (ref 164–446)
PMV BLD AUTO: 8.9 FL (ref 9–12.9)
POTASSIUM SERPL-SCNC: 3.3 MMOL/L (ref 3.6–5.5)
PROT SERPL-MCNC: 7.6 G/DL (ref 6–8.2)
PROT UR QL STRIP: NEGATIVE MG/DL
PROTHROMBIN TIME: 13.4 SEC (ref 12–14.6)
RBC # BLD AUTO: 4.49 M/UL (ref 4.2–5.4)
RBC UR QL AUTO: NEGATIVE
SODIUM SERPL-SCNC: 124 MMOL/L (ref 135–145)
SP GR UR STRIP.AUTO: 1
TROPONIN T SERPL-MCNC: <6 NG/L (ref 6–19)
UROBILINOGEN UR STRIP.AUTO-MCNC: 0.2 MG/DL
WBC # BLD AUTO: 5.6 K/UL (ref 4.8–10.8)

## 2021-06-11 PROCEDURE — 99285 EMERGENCY DEPT VISIT HI MDM: CPT

## 2021-06-11 PROCEDURE — 700102 HCHG RX REV CODE 250 W/ 637 OVERRIDE(OP): Performed by: EMERGENCY MEDICINE

## 2021-06-11 PROCEDURE — 70450 CT HEAD/BRAIN W/O DYE: CPT | Mod: ME

## 2021-06-11 PROCEDURE — 770020 HCHG ROOM/CARE - TELE (206)

## 2021-06-11 PROCEDURE — 80053 COMPREHEN METABOLIC PANEL: CPT

## 2021-06-11 PROCEDURE — 83036 HEMOGLOBIN GLYCOSYLATED A1C: CPT

## 2021-06-11 PROCEDURE — 81003 URINALYSIS AUTO W/O SCOPE: CPT

## 2021-06-11 PROCEDURE — 700102 HCHG RX REV CODE 250 W/ 637 OVERRIDE(OP): Performed by: STUDENT IN AN ORGANIZED HEALTH CARE EDUCATION/TRAINING PROGRAM

## 2021-06-11 PROCEDURE — A9270 NON-COVERED ITEM OR SERVICE: HCPCS | Performed by: STUDENT IN AN ORGANIZED HEALTH CARE EDUCATION/TRAINING PROGRAM

## 2021-06-11 PROCEDURE — A9270 NON-COVERED ITEM OR SERVICE: HCPCS | Performed by: EMERGENCY MEDICINE

## 2021-06-11 PROCEDURE — 99223 1ST HOSP IP/OBS HIGH 75: CPT | Mod: AI | Performed by: STUDENT IN AN ORGANIZED HEALTH CARE EDUCATION/TRAINING PROGRAM

## 2021-06-11 PROCEDURE — 85610 PROTHROMBIN TIME: CPT

## 2021-06-11 PROCEDURE — U0005 INFEC AGEN DETEC AMPLI PROBE: HCPCS

## 2021-06-11 PROCEDURE — 93005 ELECTROCARDIOGRAM TRACING: CPT | Performed by: EMERGENCY MEDICINE

## 2021-06-11 PROCEDURE — 93005 ELECTROCARDIOGRAM TRACING: CPT

## 2021-06-11 PROCEDURE — 85730 THROMBOPLASTIN TIME PARTIAL: CPT

## 2021-06-11 PROCEDURE — 85025 COMPLETE CBC W/AUTO DIFF WBC: CPT

## 2021-06-11 PROCEDURE — 71045 X-RAY EXAM CHEST 1 VIEW: CPT

## 2021-06-11 PROCEDURE — 84484 ASSAY OF TROPONIN QUANT: CPT

## 2021-06-11 PROCEDURE — U0003 INFECTIOUS AGENT DETECTION BY NUCLEIC ACID (DNA OR RNA); SEVERE ACUTE RESPIRATORY SYNDROME CORONAVIRUS 2 (SARS-COV-2) (CORONAVIRUS DISEASE [COVID-19]), AMPLIFIED PROBE TECHNIQUE, MAKING USE OF HIGH THROUGHPUT TECHNOLOGIES AS DESCRIBED BY CMS-2020-01-R: HCPCS

## 2021-06-11 RX ORDER — AMOXICILLIN 250 MG
2 CAPSULE ORAL 2 TIMES DAILY
Status: DISCONTINUED | OUTPATIENT
Start: 2021-06-12 | End: 2021-06-12 | Stop reason: HOSPADM

## 2021-06-11 RX ORDER — ASPIRIN 300 MG/1
300 SUPPOSITORY RECTAL DAILY
Status: DISCONTINUED | OUTPATIENT
Start: 2021-06-12 | End: 2021-06-12 | Stop reason: HOSPADM

## 2021-06-11 RX ORDER — SODIUM CHLORIDE 1 G/1
1 TABLET ORAL 3 TIMES DAILY
Status: DISCONTINUED | OUTPATIENT
Start: 2021-06-11 | End: 2021-06-12 | Stop reason: HOSPADM

## 2021-06-11 RX ORDER — ASPIRIN 325 MG
325 TABLET ORAL DAILY
Status: DISCONTINUED | OUTPATIENT
Start: 2021-06-12 | End: 2021-06-12 | Stop reason: HOSPADM

## 2021-06-11 RX ORDER — POTASSIUM CHLORIDE 20 MEQ/1
40 TABLET, EXTENDED RELEASE ORAL ONCE
Status: COMPLETED | OUTPATIENT
Start: 2021-06-11 | End: 2021-06-11

## 2021-06-11 RX ORDER — HYDRALAZINE HYDROCHLORIDE 20 MG/ML
10 INJECTION INTRAMUSCULAR; INTRAVENOUS EVERY 6 HOURS PRN
Status: DISCONTINUED | OUTPATIENT
Start: 2021-06-11 | End: 2021-06-12 | Stop reason: HOSPADM

## 2021-06-11 RX ORDER — POLYETHYLENE GLYCOL 3350 17 G/17G
1 POWDER, FOR SOLUTION ORAL
Status: DISCONTINUED | OUTPATIENT
Start: 2021-06-11 | End: 2021-06-12 | Stop reason: HOSPADM

## 2021-06-11 RX ORDER — VITAMIN B COMPLEX
5000 TABLET ORAL DAILY
Status: DISCONTINUED | OUTPATIENT
Start: 2021-06-12 | End: 2021-06-12 | Stop reason: HOSPADM

## 2021-06-11 RX ORDER — ASPIRIN 81 MG/1
324 TABLET, CHEWABLE ORAL DAILY
Status: DISCONTINUED | OUTPATIENT
Start: 2021-06-12 | End: 2021-06-12 | Stop reason: HOSPADM

## 2021-06-11 RX ORDER — BISACODYL 10 MG
10 SUPPOSITORY, RECTAL RECTAL
Status: DISCONTINUED | OUTPATIENT
Start: 2021-06-11 | End: 2021-06-12 | Stop reason: HOSPADM

## 2021-06-11 RX ORDER — MECLIZINE HYDROCHLORIDE 25 MG/1
25 TABLET ORAL ONCE
Status: COMPLETED | OUTPATIENT
Start: 2021-06-11 | End: 2021-06-11

## 2021-06-11 RX ORDER — CHOLECALCIFEROL (VITAMIN D3) 125 MCG
1000 CAPSULE ORAL DAILY
Status: DISCONTINUED | OUTPATIENT
Start: 2021-06-12 | End: 2021-06-12 | Stop reason: HOSPADM

## 2021-06-11 RX ORDER — ACETAMINOPHEN 325 MG/1
650 TABLET ORAL EVERY 6 HOURS PRN
Status: DISCONTINUED | OUTPATIENT
Start: 2021-06-11 | End: 2021-06-12 | Stop reason: HOSPADM

## 2021-06-11 RX ORDER — ALENDRONATE SODIUM 70 MG/1
70 TABLET ORAL
COMMUNITY
End: 2022-08-10

## 2021-06-11 RX ADMIN — POTASSIUM CHLORIDE 40 MEQ: 1500 TABLET, EXTENDED RELEASE ORAL at 23:44

## 2021-06-11 RX ADMIN — Medication 1 G: at 23:44

## 2021-06-11 RX ADMIN — MECLIZINE HYDROCHLORIDE 25 MG: 25 TABLET ORAL at 16:09

## 2021-06-11 ASSESSMENT — LIFESTYLE VARIABLES
ON A TYPICAL DAY WHEN YOU DRINK ALCOHOL HOW MANY DRINKS DO YOU HAVE: 1
AVERAGE NUMBER OF DAYS PER WEEK YOU HAVE A DRINK CONTAINING ALCOHOL: 4
TOTAL SCORE: 0
EVER FELT BAD OR GUILTY ABOUT YOUR DRINKING: NO
ALCOHOL_USE: YES
CONSUMPTION TOTAL: NEGATIVE
HAVE YOU EVER FELT YOU SHOULD CUT DOWN ON YOUR DRINKING: NO
HAVE PEOPLE ANNOYED YOU BY CRITICIZING YOUR DRINKING: NO
HOW MANY TIMES IN THE PAST YEAR HAVE YOU HAD 5 OR MORE DRINKS IN A DAY: 0
EVER HAD A DRINK FIRST THING IN THE MORNING TO STEADY YOUR NERVES TO GET RID OF A HANGOVER: NO
TOTAL SCORE: 0
TOTAL SCORE: 0

## 2021-06-11 ASSESSMENT — COGNITIVE AND FUNCTIONAL STATUS - GENERAL
SUGGESTED CMS G CODE MODIFIER DAILY ACTIVITY: CH
SUGGESTED CMS G CODE MODIFIER MOBILITY: CH
MOBILITY SCORE: 24
DAILY ACTIVITIY SCORE: 24

## 2021-06-11 ASSESSMENT — PAIN DESCRIPTION - PAIN TYPE: TYPE: ACUTE PAIN

## 2021-06-11 ASSESSMENT — PATIENT HEALTH QUESTIONNAIRE - PHQ9
SUM OF ALL RESPONSES TO PHQ9 QUESTIONS 1 AND 2: 0
2. FEELING DOWN, DEPRESSED, IRRITABLE, OR HOPELESS: NOT AT ALL
1. LITTLE INTEREST OR PLEASURE IN DOING THINGS: NOT AT ALL

## 2021-06-11 ASSESSMENT — ENCOUNTER SYMPTOMS: DIZZINESS: 1

## 2021-06-11 ASSESSMENT — FIBROSIS 4 INDEX
FIB4 SCORE: 0.44
FIB4 SCORE: 1.31

## 2021-06-11 NOTE — ED NOTES
Pt ambulated to the bathroom; Pt provided urine cup for sample collection, pt stated she forgot to provide sample upon exiting bathroom.

## 2021-06-11 NOTE — ED PROVIDER NOTES
ED Provider Note    Scribed for Saleem Tee M.D. by Meghana Johnson. 6/11/2021  3:34 PM    Primary care provider: Darvin Brennan M.D.  Means of arrival: Walk in  History obtained from: Patient  History limited by: None    CHIEF COMPLAINT  Chief Complaint   Patient presents with   • Dizziness   • Near Syncopal     last night       HPI  Alysa Sarabia is a 70 y.o. female with a history of hypertension who presents to the Emergency Department for evaluation of an episode of acute vertigo occurring last night at 9 PM. At the time of the event she was walking in her kitchen. She states the episode lasted a few seconds, and she felt a room spinning sensation. She lost her balance, but did not lose consciousness. She said she went to bed but the back of her head felt heavy. Today, she consistently felt light-headed and felt unsteady while walking but denies any room spinning sensation. She admits to associated symptoms of sweating, but denies vomiting, nausea, numbness, tingling, weakness, chest pains, or shortness of breath. She adds that her blood pressure was recorded to be 127/81 at 8:50 PM last night. No alleviating factors were reported. She denies any past medical history of diabetes, myocardial infarction, or stroke.     REVIEW OF SYSTEMS  Pertinent positives include vertigo, light-headedness, high blood pressure, sweating. Pertinent negatives include no vomiting, nausea, numbness, tingling, weakness, chest pains, or shortness of breath.  All other systems reviewed and negative.    PAST MEDICAL HISTORY   has a past medical history of Hydronephrosis, Hypertension, Hyponatremia, Leukopenia, Liver cyst, Small bowel obstruction (HCC), and Varicose vein of leg.    SURGICAL HISTORY   has a past surgical history that includes breast biopsy (2007); colon resection laparoscopic (3/27/2014); exploratory laparotomy (5/3/2016); cystoscopy stent placement (Right, 5/13/2016); retrogrades (5/13/2016);  "exploratory laparotomy (12/14/2016); and lumpectomy (Left, 2005).    SOCIAL HISTORY  Social History     Tobacco Use   • Smoking status: Never Smoker   • Smokeless tobacco: Never Used   Vaping Use   • Vaping Use: Never used   Substance Use Topics   • Alcohol use: Not Currently     Alcohol/week: 7.2 oz     Types: 5 Glasses of wine, 7 Standard drinks or equivalent per week     Comment: 4 per week   • Drug use: No      Social History     Substance and Sexual Activity   Drug Use No       FAMILY HISTORY  Family History   Problem Relation Age of Onset   • Hyperlipidemia Mother    • Hypertension Mother    • Heart Disease Father    • Cancer Brother         neck   • Cancer Maternal Grandmother    • No Known Problems Maternal Grandfather    • No Known Problems Paternal Grandmother    • No Known Problems Paternal Grandfather    • Alcohol/Drug Neg Hx    • Allergies Neg Hx    • Diabetes Neg Hx    • Psychiatric Illness Neg Hx    • Stroke Neg Hx    • Thyroid Neg Hx        CURRENT MEDICATIONS  None noted when reviewed     ALLERGIES  No Known Allergies    PHYSICAL EXAM  VITAL SIGNS: BP (!) 177/84   Pulse 74   Temp 36.8 °C (98.3 °F) (Temporal)   Resp 19   Ht 1.575 m (5' 2\")   Wt 49 kg (108 lb)   SpO2 98%   BMI 19.75 kg/m²     Constitutional: Well developed, Well nourished, Mild distress, Non-toxic appearance.   HENT: Normocephalic, Atraumatic, Bilateral external ears normal, Oropharynx moist, No oral exudates.   Eyes: PERRLA, EOMI, Conjunctiva normal, No discharge.   Neck: No tenderness, Supple, No stridor.   Lymphatic: No lymphadenopathy noted.   Cardiovascular: Normal heart rate, Normal rhythm.   Thorax & Lungs: Clear to auscultation bilaterally, No respiratory distress, No wheezing, No crackles.   Abdomen: Soft, No tenderness, No masses, No pulsatile masses.   Skin: Warm, Dry, No erythema, No rash.   Extremities:, No edema No cyanosis.   Musculoskeletal: No tenderness to palpation or major deformities noted.  Intact distal " pulses  Neurologic: Slight left lateral nystagmus, finger to nose and heel to shin test seems appropriate, Awake, alert. Cranial nerves 2-11 intact, muscle strength 5/5 in bilateral upper and lower extremities  Psychiatric: Affect normal, Judgment normal, Mood normal.     LABS  Results for orders placed or performed during the hospital encounter of 06/11/21   CBC WITH DIFFERENTIAL   Result Value Ref Range    WBC 5.6 4.8 - 10.8 K/uL    RBC 4.49 4.20 - 5.40 M/uL    Hemoglobin 14.0 12.0 - 16.0 g/dL    Hematocrit 40.3 37.0 - 47.0 %    MCV 89.8 81.4 - 97.8 fL    MCH 31.2 27.0 - 33.0 pg    MCHC 34.7 33.6 - 35.0 g/dL    RDW 42.9 35.9 - 50.0 fL    Platelet Count 343 164 - 446 K/uL    MPV 8.9 (L) 9.0 - 12.9 fL    Neutrophils-Polys 71.50 44.00 - 72.00 %    Lymphocytes 20.10 (L) 22.00 - 41.00 %    Monocytes 7.50 0.00 - 13.40 %    Eosinophils 0.20 0.00 - 6.90 %    Basophils 0.50 0.00 - 1.80 %    Immature Granulocytes 0.20 0.00 - 0.90 %    Nucleated RBC 0.00 /100 WBC    Neutrophils (Absolute) 4.01 2.00 - 7.15 K/uL    Lymphs (Absolute) 1.13 1.00 - 4.80 K/uL    Monos (Absolute) 0.42 0.00 - 0.85 K/uL    Eos (Absolute) 0.01 0.00 - 0.51 K/uL    Baso (Absolute) 0.03 0.00 - 0.12 K/uL    Immature Granulocytes (abs) 0.01 0.00 - 0.11 K/uL    NRBC (Absolute) 0.00 K/uL   COMP METABOLIC PANEL   Result Value Ref Range    Sodium 124 (L) 135 - 145 mmol/L    Potassium 3.3 (L) 3.6 - 5.5 mmol/L    Chloride 88 (L) 96 - 112 mmol/L    Co2 24 20 - 33 mmol/L    Anion Gap 12.0 7.0 - 16.0    Glucose 113 (H) 65 - 99 mg/dL    Bun 10 8 - 22 mg/dL    Creatinine 0.38 (L) 0.50 - 1.40 mg/dL    Calcium 8.9 8.5 - 10.5 mg/dL    AST(SGOT) 31 12 - 45 U/L    ALT(SGPT) 22 2 - 50 U/L    Alkaline Phosphatase 92 30 - 99 U/L    Total Bilirubin 0.8 0.1 - 1.5 mg/dL    Albumin 4.3 3.2 - 4.9 g/dL    Total Protein 7.6 6.0 - 8.2 g/dL    Globulin 3.3 1.9 - 3.5 g/dL    A-G Ratio 1.3 g/dL   TROPONIN   Result Value Ref Range    Troponin T <6 6 - 19 ng/L   PRTOTHROMBIN TIME (INR)    Result Value Ref Range    PT 13.4 12.0 - 14.6 sec    INR 1.05 0.87 - 1.13   APTT   Result Value Ref Range    APTT 33.8 24.7 - 36.0 sec   URINALYSIS    Specimen: Urine   Result Value Ref Range    Color Yellow     Character Clear     Specific Gravity 1.003 <1.035    Ph 8.0 5.0 - 8.0    Glucose Negative Negative mg/dL    Ketones Negative Negative mg/dL    Protein Negative Negative mg/dL    Bilirubin Negative Negative    Urobilinogen, Urine 0.2 Negative    Nitrite Negative Negative    Leukocyte Esterase Negative Negative    Occult Blood Negative Negative    Micro Urine Req see below    ESTIMATED GFR   Result Value Ref Range    GFR If African American >60 >60 mL/min/1.73 m 2    GFR If Non African American >60 >60 mL/min/1.73 m 2   EKG (NOW)   Result Value Ref Range    Report       [facility] Emergency Dept.    Test Date:  2021  Pt Name:    AQUILINO SALAZAR            Department: ER  MRN:        [id number]                      Room:  Gender:     Female                       Technician: [id number]  :        1950                   Requested By:ER TRIAGE PROTOCOL  Order #:    [id number]                    Reading MD: ALEJANDRINA ALLEN MD    Measurements  Intervals                                Axis  Rate:       76                           P:          64  GA:         168                          QRS:        6  QRSD:       104                          T:          56  QT:         412  QTc:        464    Interpretive Statements  SINUS RHYTHM  LOW VOLTAGE IN FRONTAL LEADS  Compared to ECG 2016 20:33:41  Right-axis deviation no longer present  T-wave abnormality no longer present  Electronically Signed On 2021 17:22:43 PDT by ALEJANDRINA ALLEN MD          RADIOLOGY  CT-HEAD W/O   Final Result      No acute intracranial hemorrhage is identified.      Mild white matter hypodensity is present.  This is a nonspecific finding which usually is found to represent chronic microvascular disease  in patient's of this demographic.  Demyelination, age indeterminant ischemia and gliosis are also common    possibilities.      Mildly advanced atrophy for age      DX-CHEST-PORTABLE (1 VIEW)   Final Result      No acute cardiopulmonary process is seen.        The radiologist's interpretation of all radiological studies have been reviewed by me.      COURSE & MEDICAL DECISION MAKING  Pertinent Labs & Imaging studies reviewed. (See chart for details)    I reviewed the patient's medical records which showed that the patient has a past history of hypertension and hypernitremia.     3:34 PM - Patient seen and examined at bedside. Patient will be treated with Antivert 25 mg. Ordered CT-head, DX-Chest, UA, CBC w/ diff, CMP, troponin, Prothrombin time, APTT, and EKG to evaluate her symptoms. The differential diagnoses include but are not limited to: central vs peripheral vertigo    Decision Making:  Patient is coming in with vertigo history of chronic hyponatremia.  Uncertain if this is contributing to the patient's symptoms.  Discussed the case with the hospitalist for hospitalization for further stroke work-up.      DISPOSITION:    Patient will be hospitalized        FINAL IMPRESSION  1. Vertigo    2. Ataxia    3. Hyponatremia          Meghana GONSALES (Luis Antonio), am scribing for, and in the presence of, Saleem Tee M.D..    Electronically signed by: Meghana Johnson (Luis Antonio), 6/11/2021    Saleem GONSALES M.D. personally performed the services described in this documentation, as scribed by Meghana Johnson in my presence, and it is both accurate and complete.    The note accurately reflects work and decisions made by me.  Saleem Tee M.D.  6/11/2021  5:59 PM

## 2021-06-11 NOTE — ED TRIAGE NOTES
"Pt ambulatory to triage, pt c/o dizziness, started lastnight at 8: 30 pm. Pt reports a near syncopal episode w/ her  lastnight assisting her. Did not fall or completely pass out. Pt c/o head pressure as well. States \" she just doesn't feel right\" . EKG done in triage   "

## 2021-06-12 VITALS
OXYGEN SATURATION: 95 % | BODY MASS INDEX: 21.58 KG/M2 | WEIGHT: 117.28 LBS | HEART RATE: 79 BPM | RESPIRATION RATE: 16 BRPM | SYSTOLIC BLOOD PRESSURE: 130 MMHG | TEMPERATURE: 98.2 F | HEIGHT: 62 IN | DIASTOLIC BLOOD PRESSURE: 86 MMHG

## 2021-06-12 LAB
ANION GAP SERPL CALC-SCNC: 8 MMOL/L (ref 7–16)
BASOPHILS # BLD AUTO: 0.7 % (ref 0–1.8)
BASOPHILS # BLD: 0.03 K/UL (ref 0–0.12)
BUN SERPL-MCNC: 11 MG/DL (ref 8–22)
CALCIUM SERPL-MCNC: 9 MG/DL (ref 8.5–10.5)
CHLORIDE SERPL-SCNC: 94 MMOL/L (ref 96–112)
CHOLEST SERPL-MCNC: 180 MG/DL (ref 100–199)
CO2 SERPL-SCNC: 26 MMOL/L (ref 20–33)
CREAT SERPL-MCNC: 0.44 MG/DL (ref 0.5–1.4)
EOSINOPHIL # BLD AUTO: 0.08 K/UL (ref 0–0.51)
EOSINOPHIL NFR BLD: 1.9 % (ref 0–6.9)
ERYTHROCYTE [DISTWIDTH] IN BLOOD BY AUTOMATED COUNT: 41.7 FL (ref 35.9–50)
GLUCOSE SERPL-MCNC: 95 MG/DL (ref 65–99)
HCT VFR BLD AUTO: 36.4 % (ref 37–47)
HDLC SERPL-MCNC: 71 MG/DL
HGB BLD-MCNC: 12.7 G/DL (ref 12–16)
IMM GRANULOCYTES # BLD AUTO: 0.01 K/UL (ref 0–0.11)
IMM GRANULOCYTES NFR BLD AUTO: 0.2 % (ref 0–0.9)
LDLC SERPL CALC-MCNC: 96 MG/DL
LYMPHOCYTES # BLD AUTO: 1.2 K/UL (ref 1–4.8)
LYMPHOCYTES NFR BLD: 28.1 % (ref 22–41)
MCH RBC QN AUTO: 30.8 PG (ref 27–33)
MCHC RBC AUTO-ENTMCNC: 34.9 G/DL (ref 33.6–35)
MCV RBC AUTO: 88.3 FL (ref 81.4–97.8)
MONOCYTES # BLD AUTO: 0.44 K/UL (ref 0–0.85)
MONOCYTES NFR BLD AUTO: 10.3 % (ref 0–13.4)
NEUTROPHILS # BLD AUTO: 2.51 K/UL (ref 2–7.15)
NEUTROPHILS NFR BLD: 58.8 % (ref 44–72)
NRBC # BLD AUTO: 0 K/UL
NRBC BLD-RTO: 0 /100 WBC
PLATELET # BLD AUTO: 323 K/UL (ref 164–446)
PMV BLD AUTO: 8.8 FL (ref 9–12.9)
POTASSIUM SERPL-SCNC: 3.6 MMOL/L (ref 3.6–5.5)
RBC # BLD AUTO: 4.12 M/UL (ref 4.2–5.4)
SARS-COV-2 RNA RESP QL NAA+PROBE: NOTDETECTED
SODIUM SERPL-SCNC: 128 MMOL/L (ref 135–145)
SPECIMEN SOURCE: NORMAL
TRIGL SERPL-MCNC: 65 MG/DL (ref 0–149)
WBC # BLD AUTO: 4.3 K/UL (ref 4.8–10.8)

## 2021-06-12 PROCEDURE — 700102 HCHG RX REV CODE 250 W/ 637 OVERRIDE(OP): Performed by: STUDENT IN AN ORGANIZED HEALTH CARE EDUCATION/TRAINING PROGRAM

## 2021-06-12 PROCEDURE — 92610 EVALUATE SWALLOWING FUNCTION: CPT

## 2021-06-12 PROCEDURE — 80048 BASIC METABOLIC PNL TOTAL CA: CPT

## 2021-06-12 PROCEDURE — 80061 LIPID PANEL: CPT

## 2021-06-12 PROCEDURE — 99239 HOSP IP/OBS DSCHRG MGMT >30: CPT | Performed by: INTERNAL MEDICINE

## 2021-06-12 PROCEDURE — 85025 COMPLETE CBC W/AUTO DIFF WBC: CPT

## 2021-06-12 PROCEDURE — 97165 OT EVAL LOW COMPLEX 30 MIN: CPT

## 2021-06-12 PROCEDURE — A9270 NON-COVERED ITEM OR SERVICE: HCPCS | Performed by: STUDENT IN AN ORGANIZED HEALTH CARE EDUCATION/TRAINING PROGRAM

## 2021-06-12 PROCEDURE — 36415 COLL VENOUS BLD VENIPUNCTURE: CPT

## 2021-06-12 RX ORDER — MECLIZINE HCL 12.5 MG/1
12.5 TABLET ORAL 3 TIMES DAILY PRN
Qty: 15 TABLET | Refills: 0 | Status: SHIPPED | OUTPATIENT
Start: 2021-06-12 | End: 2021-06-17

## 2021-06-12 RX ADMIN — Medication 5000 UNITS: at 06:07

## 2021-06-12 RX ADMIN — THERA TABS 1 TABLET: TAB at 06:07

## 2021-06-12 RX ADMIN — Medication 1 G: at 06:07

## 2021-06-12 RX ADMIN — CYANOCOBALAMIN TAB 500 MCG 1000 MCG: 500 TAB at 06:07

## 2021-06-12 RX ADMIN — Medication 1 G: at 12:54

## 2021-06-12 RX ADMIN — ASPIRIN 325 MG ORAL TABLET 325 MG: 325 PILL ORAL at 06:07

## 2021-06-12 ASSESSMENT — COGNITIVE AND FUNCTIONAL STATUS - GENERAL
DAILY ACTIVITIY SCORE: 24
SUGGESTED CMS G CODE MODIFIER DAILY ACTIVITY: CH

## 2021-06-12 ASSESSMENT — PAIN DESCRIPTION - PAIN TYPE
TYPE: ACUTE PAIN
TYPE: ACUTE PAIN

## 2021-06-12 NOTE — THERAPY
Speech Language Pathology   Clinical Swallow Evaluation     Patient Name: Alysa Sarabia  AGE:  70 y.o., SEX:  female  Medical Record #: [id number]  Today's Date: 6/12/2021          Assessment  70 y.o. female with past medical history of HTN, chronic hyponatremia on salt tab, who presented 6/11/2021 with new onset of dizziness. Patient reports she started to feel room spinning last night around 9:30pm, which last about a few seconds. She lost her balance, but did not lose consciousness. She said she went to bed but the back of her head felt heavy. Today, she consistently felt light-headed and felt unsteady while walking but denies any room spinning sensation. Endorse sweating, but denies vomiting, nausea, numbness, tingling, weakness, chest pains, or shortness of breath, focal weakness, slurred speech and facial droop.  Patient lives with her .  Here Bp was noted 190/101 on arrival. Lab remarkable for Na 124, which she has chronic hyponatremia, K 3.3. CT head No acute intracranial hemorrhage is identified. Mild white matter hypodensity is present.  Patent is admitted to further evaluation and managements       Pt seen at bedside alert and oriented x4. Pt reports no prior hx of dysphagia. S/L skills screened and appear WNL at this time.  OM exam WNL.  Pt completed trials of ice chips, thin (singular and consecutive boluses), easy to chew and regular consistencies.   No overt s/sx of pen/ asp observed.   Pt with adequate labial seal, timely A-P transit and deglutition. No pocketing/stasis observed. No r/o globus.    SLP reviewed safe swallowing strategies prior to leaving the room.  All questions answered.      Plan    Continue on regular/thin diet.  No tx indicated at this time/ EVAL ONLY.     Discharge Recommendations: (P) Anticipate that the patient will have no further speech therapy needs after discharge from the hospital     Objective       06/12/21 0853   Oral Motor Eval    Is Patient Able to  Complete Oral Motor Eval Yes, Within Normal Limits   Laryngeal Function   Voice Quality Within Functional Limits   Volutional Cough Within Functional Limits   Excursion Upon Swallow Complete   Oral Food Presentation   Ice Chips Within Functional Limits   Single Swallow Moderately Thick (3) - (Honey Thick)  Not Tested   Serial Swallow Moderately Thick (3) - (Honey Thick)  Not Tested   Single Swallow Mildly Thick (2) - (Nectar Thick)  Not Tested   Serial Swallow Mildly Thick (2) - (Nectar Thick) Not Tested   Single Swallow Slightly Thick (1)  Not Tested   Serial Swallow Slightly Thick (1) Not Tested   Single Swallow Thin (0) Within Functional Limits   Serial Swallow Thin (0) Within Functional Limits   Liquidised (3) Not Tested   Pureed (4) Not Tested   Minced & Moist (5) - (Dysphagia II) Not Tested   Soft & Bite-Sized (6) - (Dysphagia III) Not Tested   Regular (7) Within Functional Limits   Regular-Easy to Chew (7) Within Functional Limits   Self Feeding Independent   Tracheostomy   Tracheostomy  No   Dysphagia Strategies / Recommendations   Strategies / Interventions Recommended (Yes / No) Yes   Diet / Liquid Recommendation Regular (7);Thin (0)   Medication Administration  Whole with Liquid Wash   Therapy Interventions No Swallowing Intervention Required   Dysphagia Rating   Nutritional Liquid Intake Rating Scale Non thickened beverages   Nutritional Food Intake Rating Scale Total oral diet with no restrictions

## 2021-06-12 NOTE — ED NOTES
Med rec complete per interview with pt at bedside and rx bottles at bedside. Allergies reviewed. Pt denies antibiotic use in the past 14 days.

## 2021-06-12 NOTE — ASSESSMENT & PLAN NOTE
New onset vertigo, associated with light headness and unsteady gait  ddx peripheral vertigo vs. Central vertigo  CT head personally reviewed: No acute intracranial hemorrhage.  Mild white matter hypodensities present.  MRI brain ordered to r/o CVA  Permissive hypertension now until CVA is ruled out by MRI  On ASA  Check lipid profile, A1c  PT/OT/speech  Orthostatic vital  Check echo with bubble study  Tele  Neuro check per protocol

## 2021-06-12 NOTE — PROGRESS NOTES
Pt transferred to [facility] with  at bedside, VSS, pt in bed comfortably. Pt oriented to room, call light and belongings in reach.     4 Eyes Skin Assessment Completed by Desire UMANZOR RN and Tete UMANZOR RN.    Head WDL  Ears WDL  Nose WDL  Mouth WDL  Neck WDL  Breast/Chest WDL  Shoulder Blades WDL  Spine WDL  (R) Arm/Elbow/Hand WDL  (L) Arm/Elbow/Hand WDL  Abdomen WDL  Groin WDL  Scrotum/Coccyx/Buttocks WDL  (R) Leg WDL  (L) Leg WDL  (R) Heel/Foot/Toe WDL  (L) Heel/Foot/Toe WDL          Devices In Places Tele Box      Interventions In Place N/A    Possible Skin Injury No    Pictures Uploaded Into Epic N/A  Wound Consult Placed N/A  RN Wound Prevention Protocol Ordered No

## 2021-06-12 NOTE — CARE PLAN
The patient is Stable - Low risk of patient condition declining or worsening    Shift Goals  Clinical Goals: MRI, increase sodium levels  Patient Goals: Sleep, find answers    Progress made toward(s) clinical / shift goals:  Ambulation

## 2021-06-12 NOTE — DISCHARGE SUMMARY
Discharge Summary    CHIEF COMPLAINT ON ADMISSION  Chief Complaint   Patient presents with   • Dizziness   • Near Syncopal     last night       Reason for Admission  Dizziness     Admission Date  6/11/2021    CODE STATUS  Full Code    HPI & HOSPITAL COURSE  This is a 70 y.o. female here with dizziness.    70-year-old female with past medical history of hypertension and chronic hyponatremia who presented 6/11/2021 for new onset dizziness.  Patient felt the room spinning which lasted a few minutes with associated difficulty with balance.  Patient denied loss of consciousness.  In the ER patient was found to be hypertensive with blood pressure 190/101.  CT of the head showed no acute intracranial hemorrhage.  On admission they had concern for central vertigo, however patient has a completely benign neurologic exam and she reports that all of her symptoms have resolved.  I believe that her symptoms were due to vertigo and I am giving her meclizine to use as needed.  Therapy evaluated the patient and anticipated that she would have no further therapy needs after discharge.  Patient was kept off of her blood pressure medication and hypertension resolved without treatment.  Patient does have chronic hyponatremia and is on salt tabs, I will discontinue her hydrochlorothiazide as this is likely making her hyponatremia worse.  I have discussed with the patient and her  at bedside that they will need to follow-up with primary care for further blood pressure management and if her vertigo symptoms come back.  Patient's vital signs are stable and she is ready for discharge home.  She is to return to the ER if her condition worsens.    Therefore, she is discharged in good and stable condition to home with close outpatient follow-up.      Discharge Date  6/12/2021    FOLLOW UP ITEMS POST DISCHARGE  Follow-up with primary care physician.    DISCHARGE DIAGNOSES  Principal Problem (Resolved):    Vertigo POA: Yes  Active  Problems:    Essential hypertension POA: Yes    Hyponatremia POA: Yes  Resolved Problems:    Hypokalemia POA: Unknown      Overview: 12/16 - stable      Replete and trend      FOLLOW UP  Future Appointments   Date Time Provider Department Center   11/8/2021  9:00 AM Darvin Brennan M.D. [address] Kelleykaveh Brennan M.D.  [address] [facility] [address] [place] [address]  [phone]    Schedule an appointment as soon as possible for a visit  Follow up with primary care for further blood pressure management in 1-2 weeks      MEDICATIONS ON DISCHARGE     Medication List      START taking these medications      Instructions   meclizine 12.5 MG Tabs  Commonly known as: ANTIVERT   Take 1 tablet by mouth 3 times a day as needed for Dizziness for up to 5 days.  Dose: 12.5 mg        CONTINUE taking these medications      Instructions   alendronate 70 MG Tabs  Commonly known as: FOSAMAX   Take 70 mg by mouth every 7 days.  Dose: 70 mg     ascorbic acid 500 MG Tabs  Commonly known as: ascorbic acid   Take 500 mg by mouth every day.  Dose: 500 mg     CALCIUM PO   Take 1 tablet by mouth every day.  Dose: 1 tablet     losartan 100 MG Tabs  Commonly known as: COZAAR   Take 1 Tab by mouth every day.  Dose: 100 mg     multivitamin Tabs   Take 1 Tab by mouth every day.  Dose: 1 tablet     PRESERVISION AREDS PO   Take 1 tablet by mouth 2 times a day.  Dose: 1 tablet     sodium chloride 1 GM Tabs  Commonly known as: SALT   Take 1 tablet by mouth 3 times a day.  Dose: 1 g     Vitamin B-12 2500 MCG Subl   Place 1 tablet under the tongue every day.  Dose: 1 tablet     Vitamin D3 125 MCG (5000 UT) Tabs   Take 1 capsule by mouth every day.  Dose: 1 capsule        STOP taking these medications    hydroCHLOROthiazide 25 MG Tabs  Commonly known as: HYDRODIURIL            Allergies  No Known Allergies    DIET  Orders Placed This Encounter   Procedures   • Diet Order Diet: Regular     Standing Status:   Standing     Number of Occurrences:   1      Order Specific Question:   Diet:     Answer:   Regular [1]       ACTIVITY  As tolerated.  Weight bearing as tolerated    CONSULTATIONS  N/A    PROCEDURES  N/A    LABORATORY  Lab Results   Component Value Date    SODIUM 128 (L) 06/12/2021    POTASSIUM 3.6 06/12/2021    CHLORIDE 94 (L) 06/12/2021    CO2 26 06/12/2021    GLUCOSE 95 06/12/2021    BUN 11 06/12/2021    CREATININE 0.44 (L) 06/12/2021    CREATININE 0.6 05/02/2007        Lab Results   Component Value Date    WBC 4.3 (L) 06/12/2021    HEMOGLOBIN 12.7 06/12/2021    HEMATOCRIT 36.4 (L) 06/12/2021    PLATELETCT 323 06/12/2021        Total time of the discharge process exceeds 35 minutes.

## 2021-06-12 NOTE — DISCHARGE INSTRUCTIONS
Discharge Instructions    Discharged to home by car with relative. Discharged via wheelchair, hospital escort: Yes.  Special equipment needed: Not Applicable    Be sure to schedule a follow-up appointment with your primary care doctor or any specialists as instructed.     Discharge Plan:   Diet Plan: Discussed  Activity Level: Discussed  Confirmed Follow up Appointment: Patient to Call and Schedule Appointment  Confirmed Symptoms Management: Discussed  Medication Reconciliation Updated: Yes    I understand that a diet low in cholesterol, fat, and sodium is recommended for good health. Unless I have been given specific instructions below for another diet, I accept this instruction as my diet prescription.   Other diet: Regular    Special Instructions: None    · Is patient discharged on Warfarin / Coumadin?   No     Depression / Suicide Risk    As you are discharged from this [facility] facility, it is important to learn how to keep safe from harming yourself.    Recognize the warning signs:  · Abrupt changes in personality, positive or negative- including increase in energy   · Giving away possessions  · Change in eating patterns- significant weight changes-  positive or negative  · Change in sleeping patterns- unable to sleep or sleeping all the time   · Unwillingness or inability to communicate  · Depression  · Unusual sadness, discouragement and loneliness  · Talk of wanting to die  · Neglect of personal appearance   · Rebelliousness- reckless behavior  · Withdrawal from people/activities they love  · Confusion- inability to concentrate     If you or a loved one observes any of these behaviors or has concerns about self-harm, here's what you can do:  · Talk about it- your feelings and reasons for harming yourself  · Remove any means that you might use to hurt yourself (examples: pills, rope, extension cords, firearm)  · Get professional help from the community (Mental Health, Substance Abuse, psychological  counseling)  · Do not be alone:Call your Safe Contact- someone whom you trust who will be there for you.  · Call your local CRISIS HOTLINE 871-2989 or 237-402-3358  · Call your local Children's Mobile Crisis Response Team [place] (253) 620-9057 or www.[employer]  · Call the toll free National Suicide Prevention Hotlines   · National Suicide Prevention Lifeline 800-364-LCOW (2437)  · National Hope Line Network 800-SUICIDE (031-3668)

## 2021-06-12 NOTE — THERAPY
"Occupational Therapy   Initial Evaluation     Patient Name: Alysa Sarabia  Age:  70 y.o., Sex:  female  Medical Record #: [id number]  Today's Date: 6/12/2021          Assessment  Patient is 70 y.o. female with a diagnosis of dizziness with near syncopal episode.  Additional factors influencing patient status / progress: N/A. Patient doing well today, reports very mild dizziness at rest, does not change with positional changes, cervical rotation, ambulation, etc. She is very functional with mobility, maintains good balance with ambulation and standing ADLs. Patient is presently waiting for MRI and is hoping to DC today. No concerns from OT persepctice.       Plan    Recommend Occupational Therapy for Evaluation only.       Discharge Recommendations: Anticipate that the patient will have no further occupational therapy needs after discharge from the hospital     Subjective    \"I walk 5 miles a day, I am a walker!\"     Objective       06/12/21 0923   Prior Living Situation   Prior Services None   Housing / Facility 1 Story House   Steps Into Home 0   Bathroom Set up Bathtub / Shower Combination   Equipment Owned None   Lives with - Patient's Self Care Capacity Spouse   Prior Level of ADL Function   Comments Ind prior   Prior Level of IADL Function   Comments Ind prior, patient reports that she walks 5 miles/day   Cognition    Cognition / Consciousness WDL   Level of Consciousness Alert   Comments Pleasant and cooperative   Balance Assessment   Sitting Balance (Static) Good   Sitting Balance (Dynamic) Good   Standing Balance (Static) Fair +   Standing Balance (Dynamic) Fair +   Weight Shift Sitting Good   Weight Shift Standing Good   Comments No AD   Bed Mobility    Supine to Sit Modified Independent   Sit to Supine Modified Independent   ADL Assessment   Eating Independent   Lower Body Dressing Independent   Functional Mobility   Sit to Stand Modified Independent   Bed, Chair, Wheelchair Transfer Modified " Independent   Transfer Method Stand Pivot   Mobility sup><sit, STS   Activity Tolerance   Sitting Edge of Bed 4 mins   Standing 10 mins

## 2021-06-12 NOTE — H&P
Hospital Medicine History & Physical Note    Date of Service  6/11/2021    Primary Care Physician  Darvin Brennan M.D.    Consultants  none    Code Status  Full Code    Chief Complaint  Chief Complaint   Patient presents with   • Dizziness   • Near Syncopal     last night       History of Presenting Illness  70 y.o. female with past medical history of HTN, chronic hyponatremia on salt tab, who presented 6/11/2021 with new onset of dizziness. Patient reports she started to feel room spinning last night around 9:30pm, which last about a few seconds. She lost her balance, but did not lose consciousness. She said she went to bed but the back of her head felt heavy. Today, she consistently felt light-headed and felt unsteady while walking but denies any room spinning sensation. Endorse sweating, but denies vomiting, nausea, numbness, tingling, weakness, chest pains, or shortness of breath, focal weakness, slurred speech and facial droop.  Patient lives with her .  Here Bp was noted 190/101 on arrival. Lab remarkable for Na 124, which she has chronic hyponatremia, K 3.3. CT head No acute intracranial hemorrhage is identified. Mild white matter hypodensity is present.  Patent is admitted to further evaluation and managements      Review of Systems  Review of Systems   Neurological: Positive for dizziness.        Vertigo  Unsteady gait   All other systems reviewed and are negative.      Past Medical History   has a past medical history of Hydronephrosis, Hypertension, Hyponatremia, Leukopenia, Liver cyst, Small bowel obstruction (HCC), and Varicose vein of leg.    Surgical History   has a past surgical history that includes breast biopsy (2007); colon resection laparoscopic (3/27/2014); exploratory laparotomy (5/3/2016); cystoscopy stent placement (Right, 5/13/2016); retrogrades (5/13/2016); exploratory laparotomy (12/14/2016); and lumpectomy (Left, 2005).     Family History  family history includes Cancer in  her brother and maternal grandmother; Heart Disease in her father; Hyperlipidemia in her mother; Hypertension in her mother; No Known Problems in her maternal grandfather, paternal grandfather, and paternal grandmother.     Social History   reports that she has never smoked. She has never used smokeless tobacco. She reports previous alcohol use of about 7.2 oz of alcohol per week. She reports that she does not use drugs.    Allergies  No Known Allergies    Medications  Prior to Admission Medications   Prescriptions Last Dose Informant Patient Reported? Taking?   CALCIUM PO 6/11/2021 at AM Patient Yes No   Sig: Take 1 tablet by mouth every day.   Cholecalciferol (VITAMIN D3) 125 MCG (5000 UT) Tab 6/11/2021 at AM Patient Yes No   Sig: Take 1 capsule by mouth every day.   Cyanocobalamin (VITAMIN B-12) 2500 MCG SL Tab >7 days ago at unknown Patient Yes No   Sig: Place 1 tablet under the tongue every day.   Multiple Vitamins-Minerals (PRESERVISION AREDS PO) 6/11/2021 at AM Patient Yes No   Sig: Take 1 tablet by mouth 2 times a day.   alendronate (FOSAMAX) 70 MG Tab 6/7/2021 at unknown Patient Yes Yes   Sig: Take 70 mg by mouth every 7 days.   ascorbic acid (ASCORBIC ACID) 500 MG Tab 6/11/2021 at AM Patient Yes No   Sig: Take 500 mg by mouth every day.   hydroCHLOROthiazide (HYDRODIURIL) 25 MG Tab 6/11/2021 at AM Patient No No   Sig: Take 1 Tab by mouth every day.   Patient taking differently: Take 12.5 mg by mouth every day.   losartan (COZAAR) 100 MG Tab 6/11/2021 at AM Patient No No   Sig: Take 1 Tab by mouth every day.   multivitamin (THERAGRAN) Tab 6/11/2021 at AM Patient Yes No   Sig: Take 1 Tab by mouth every day.   sodium chloride (SALT) 1 GM Tab 6/11/2021 at AM Patient No No   Sig: Take 1 tablet by mouth 3 times a day.      Facility-Administered Medications: None       Physical Exam  Temp:  [36.8 °C (98.3 °F)] 36.8 °C (98.3 °F)  Pulse:  [70-88] 77  Resp:  [14-19] 14  BP: (140-190)/() 155/82  SpO2:  [96 %-99  %] 96 %    Physical Exam  Vitals and nursing note reviewed.   Constitutional:       General: She is not in acute distress.     Appearance: Normal appearance.   HENT:      Head: Normocephalic and atraumatic.      Nose: Nose normal.      Mouth/Throat:      Mouth: Mucous membranes are dry.      Pharynx: Oropharynx is clear.   Eyes:      Extraocular Movements: Extraocular movements intact.      Conjunctiva/sclera: Conjunctivae normal.      Pupils: Pupils are equal, round, and reactive to light.   Cardiovascular:      Rate and Rhythm: Normal rate and regular rhythm.      Pulses: Normal pulses.      Heart sounds: Normal heart sounds.   Pulmonary:      Effort: Pulmonary effort is normal. No respiratory distress.      Breath sounds: Normal breath sounds.   Abdominal:      General: Abdomen is flat. Bowel sounds are normal.      Palpations: Abdomen is soft.      Tenderness: There is no abdominal tenderness. There is no guarding.   Musculoskeletal:         General: No swelling or deformity. Normal range of motion.      Cervical back: Normal range of motion and neck supple. No rigidity or tenderness.   Skin:     General: Skin is warm and dry.   Neurological:      General: No focal deficit present.      Mental Status: She is alert and oriented to person, place, and time. Mental status is at baseline.      Cranial Nerves: No cranial nerve deficit.      Motor: No weakness.   Psychiatric:         Mood and Affect: Mood normal.         Behavior: Behavior normal.         Laboratory:  Recent Labs     06/11/21  1458   WBC 5.6   RBC 4.49   HEMOGLOBIN 14.0   HEMATOCRIT 40.3   MCV 89.8   MCH 31.2   MCHC 34.7   RDW 42.9   PLATELETCT 343   MPV 8.9*     Recent Labs     06/11/21  1458   SODIUM 124*   POTASSIUM 3.3*   CHLORIDE 88*   CO2 24   GLUCOSE 113*   BUN 10   CREATININE 0.38*   CALCIUM 8.9     Recent Labs     06/11/21  1458   ALTSGPT 22   ASTSGOT 31   ALKPHOSPHAT 92   TBILIRUBIN 0.8   GLUCOSE 113*     Recent Labs     06/11/21  1538   APTT  33.8   INR 1.05     No results for input(s): NTPROBNP in the last 72 hours.      Recent Labs     06/11/21  1458   TROPONINT <6       Imaging:  CT-HEAD W/O   Final Result      No acute intracranial hemorrhage is identified.      Mild white matter hypodensity is present.  This is a nonspecific finding which usually is found to represent chronic microvascular disease in patient's of this demographic.  Demyelination, age indeterminant ischemia and gliosis are also common    possibilities.      Mildly advanced atrophy for age      DX-CHEST-PORTABLE (1 VIEW)   Final Result      No acute cardiopulmonary process is seen.            Assessment/Plan:  I anticipate this patient will require at least two midnights for appropriate medical management, necessitating inpatient admission.    * Vertigo  Assessment & Plan  New onset vertigo, associated with light headness and unsteady gait  ddx peripheral vertigo vs. Central vertigo  CT head personally reviewed: No acute intracranial hemorrhage.  Mild white matter hypodensities present.  MRI brain ordered to r/o CVA  Permissive hypertension now until CVA is ruled out by MRI  On ASA  Check lipid profile, A1c  PT/OT/speech  Orthostatic vital  Check echo with bubble study  Tele  Neuro check per protocol    Hyponatremia- (present on admission)  Assessment & Plan  Chronic hyponatremia on salt tabs  Will cont salt tab 1gm tid  Cont to monitor    Hypokalemia  Assessment & Plan  Replace as indicated    Essential hypertension- (present on admission)  Assessment & Plan  Hx of HTN, on HCTZ and cozaar. Hold on HTN meds until CVA ruled out by MRI  Cont to monitor

## 2021-06-12 NOTE — CARE PLAN
The patient is Stable - Low risk of patient condition declining or worsening    Shift Goals  Clinical Goals: MRI, increase sodium levels  Patient Goals: Sleep, find answers    Progress made toward(s) clinical / shift goals:  MRI scheduled for the morning, pt updated on POC. Pt able to rest comfortably throughout shift.     Patient is not progressing towards the following goals:

## 2021-06-14 ENCOUNTER — PATIENT OUTREACH (OUTPATIENT)
Dept: MEDICAL GROUP | Age: 71
End: 2021-06-14

## 2021-06-14 NOTE — PROGRESS NOTES
Returned call for TCM.  Reviewed medications and discharge instructions.  Appointment with PCP on 6/16/21 at 4pm. Denies and issues with obtaining medications, transportation,or food insecurity.  Declines GSC.

## 2021-06-15 ENCOUNTER — TELEPHONE (OUTPATIENT)
Dept: MEDICAL GROUP | Age: 71
End: 2021-06-15

## 2021-06-15 NOTE — TELEPHONE ENCOUNTER
"ESTABLISHED PATIENT PRE-VISIT PLANNING   Tuesday, 06/15/2021@11:33AM    Patient was NOT contacted to complete PVP.     Note: Patient will not be contacted if there is no indication to call.     1.  Reviewed notes from the last few office visits within the medical group: Yes    2.  If any orders were placed at last visit or intended to be done for this visit (i.e. 6 mos follow-up), do we have Results/Consult Notes?         •  Labs - Labs ordered, but not to be completed until Per 's Telemedicine Progress notes on 05/04/21: \"Return in about 6 months (around 11/4/2021) for ANNUAL, LABS.\".  Note: If patient appointment is for lab review and patient did not complete labs, check with provider if OK to reschedule patient until labs completed.       •  Imaging - Imaging ordered, completed and results are in chart.       •  Referrals - No referrals were ordered at last office visit.    3. Is this appointment scheduled as a Hospital Follow-Up? Yes, visit was at [facility].     4.  Immunizations were updated in Epic using Reconcile Outside Information activity? Yes    5.  Patient is due for the following Health Maintenance Topics:   There are no preventive care reminders to display for this patient.    6.  AHA (Pulse8) form printed for Provider? No, patient does not have any open alerts      "

## 2021-06-16 ENCOUNTER — OFFICE VISIT (OUTPATIENT)
Dept: MEDICAL GROUP | Age: 71
End: 2021-06-16
Payer: MEDICARE

## 2021-06-16 VITALS
SYSTOLIC BLOOD PRESSURE: 122 MMHG | HEART RATE: 80 BPM | DIASTOLIC BLOOD PRESSURE: 70 MMHG | BODY MASS INDEX: 20.98 KG/M2 | WEIGHT: 114 LBS | HEIGHT: 62 IN | TEMPERATURE: 99.4 F | OXYGEN SATURATION: 99 %

## 2021-06-16 DIAGNOSIS — Z09 HOSPITAL DISCHARGE FOLLOW-UP: ICD-10-CM

## 2021-06-16 DIAGNOSIS — E87.6 HYPOKALEMIA: ICD-10-CM

## 2021-06-16 DIAGNOSIS — R42 VERTIGO: ICD-10-CM

## 2021-06-16 DIAGNOSIS — E87.1 HYPONATREMIA: ICD-10-CM

## 2021-06-16 DIAGNOSIS — I10 ESSENTIAL HYPERTENSION: ICD-10-CM

## 2021-06-16 PROCEDURE — 99214 OFFICE O/P EST MOD 30 MIN: CPT | Performed by: INTERNAL MEDICINE

## 2021-06-16 ASSESSMENT — FIBROSIS 4 INDEX: FIB4 SCORE: 1.39

## 2021-06-16 NOTE — PROGRESS NOTES
CHIEF COMPLAINT  Chief Complaint   Patient presents with   • Hospital Follow-up     for dizziness     HPI  Alysa Sarabia is a 70 y.o. female who presents today for the following     Hospital discharge follow-up, vertigo, hypertension, hyponatremia, hypokalemia  The patient was hospitalized from 6/11/2021 to 6/12/2021, discharge summary was reviewed with the following discharge diagnoses and hospitalization course:   Vertigo POA: Yes  Essential hypertension POA: Yes  Hyponatremia POA: Yes  Hypokalemia POA: Unknown    Hospitalization course  70-year-old female with past medical history of hypertension and chronic hyponatremia who presented 6/11/2021 for new onset dizziness.    Patient felt the room spinning which lasted a few minutes with associated difficulty with balance.  Patient denied loss of consciousness.  In the ER patient was found to be hypertensive with blood pressure 190/101.    CT of the head showed no acute intracranial hemorrhage.    On admission there was concern for central vertigo, however patient has a completely benign neurologic exam and she reports that all of her symptoms have resolved. Probably, symptoms were due to vertigo and given meclizine to use as needed.     Patient was kept off of her blood pressure medication and hypertension resolved without treatment.    HCTZ was d/kailee it could make hyponatremia worse.       She was discharged in good and stable condition to home with close outpatient follow-up.      I reviewed labs and imaging from hospitalization    Posthospitalization course   The patient was stable, with normal blood pressure and compliant with medications.    Reviewed PMH, PSH, FH, SH, ALL, HCM/IMM, no changes  Reviewed MEDS, no changes    Patient Active Problem List    Diagnosis Date Noted   • Age-related osteoporosis without current pathological fracture 10/26/2020   • Hypovitaminosis D 06/18/2020   • Neutropenia (HCC) 11/04/2019   • Hyponatremia 11/04/2019   •  Health care maintenance 04/25/2019   • Hypercholesterolemia 04/25/2019   • Benign liver cyst 07/24/2017   • Essential hypertension 09/03/2015     CURRENT MEDICATIONS  Current Outpatient Medications   Medication Sig Dispense Refill   • meclizine (ANTIVERT) 12.5 MG Tab Take 1 tablet by mouth 3 times a day as needed for Dizziness for up to 5 days. 15 tablet 0   • alendronate (FOSAMAX) 70 MG Tab Take 70 mg by mouth every 7 days.     • sodium chloride (SALT) 1 GM Tab Take 1 tablet by mouth 3 times a day. 270 tablet 3   • losartan (COZAAR) 100 MG Tab Take 1 Tab by mouth every day. 90 Tab 3   • Multiple Vitamins-Minerals (PRESERVISION AREDS PO) Take 1 tablet by mouth 2 times a day.     • Cholecalciferol (VITAMIN D3) 125 MCG (5000 UT) Tab Take 1 capsule by mouth every day.     • Cyanocobalamin (VITAMIN B-12) 2500 MCG SL Tab Place 1 tablet under the tongue every day.     • ascorbic acid (ASCORBIC ACID) 500 MG Tab Take 500 mg by mouth every day.     • CALCIUM PO Take 1 tablet by mouth every day.     • multivitamin (THERAGRAN) Tab Take 1 Tab by mouth every day.       No current facility-administered medications for this visit.     ALLERGIES  Allergies: Patient has no known allergies.  PAST MEDICAL HISTORY  Past Medical History:   Diagnosis Date   • Hydronephrosis    • Hypertension    • Hyponatremia    • Leukopenia    • Liver cyst     benign, no f/u   • Small bowel obstruction (HCC)     R   • Varicose vein of leg      SURGICAL HISTORY  She  has a past surgical history that includes breast biopsy (2007); colon resection laparoscopic (3/27/2014); exploratory laparotomy (5/3/2016); cystoscopy stent placement (Right, 5/13/2016); retrogrades (5/13/2016); exploratory laparotomy (12/14/2016); and lumpectomy (Left, 2005).  SOCIAL HISTORY  Social History     Tobacco Use   • Smoking status: Never Smoker   • Smokeless tobacco: Never Used   Vaping Use   • Vaping Use: Never used   Substance Use Topics   • Alcohol use: Not Currently      "Alcohol/week: 7.2 oz     Types: 5 Glasses of wine, 7 Standard drinks or equivalent per week     Comment: 4 per week   • Drug use: No     Social History     Social History Narrative    Lives with .     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     FAMILY HISTORY  Family History   Problem Relation Age of Onset   • Hyperlipidemia Mother    • Hypertension Mother    • Heart Disease Father    • Cancer Brother         neck   • Cancer Maternal Grandmother    • No Known Problems Maternal Grandfather    • No Known Problems Paternal Grandmother    • No Known Problems Paternal Grandfather    • Alcohol/Drug Neg Hx    • Allergies Neg Hx    • Diabetes Neg Hx    • Psychiatric Illness Neg Hx    • Stroke Neg Hx    • Thyroid Neg Hx      Family Status   Relation Name Status   • Mo  Alive   • Fa  Alive   • Bro  (Not Specified)   • MGMo     • MGFa     • PGMo     • PGFa     • Neg Hx  (Not Specified)     ROS   Constitutional: Negative for fever, chills.  Eyes: Negative for vision problems.   Respiratory: Negative for cough, shortness of breath.  Cardiovascular: Negative for chest pain, palpitations.   Gastrointestinal: Negative for heartburn, nausea, abdominal pain.   Musculoskeletal: Negative for significant myalgia, back and joint pain.   Skin: Negative for rash.   Neuro: Per HPI.   Endo/Heme/Allergies: Does not bruise/bleed easily.   Psychiatric/Behavioral: Negative for depression.    PHYSICAL EXAM   Blood Pressure 122/70 (BP Location: Left arm, Patient Position: Sitting, BP Cuff Size: Adult)   Pulse 80   Temperature 37.4 °C (99.4 °F) (Temporal)   Height 1.575 m (5' 2\")   Weight 51.7 kg (114 lb)   Oxygen Saturation 99%  Body mass index is 20.85 kg/m².  General:  NAD, well appearing  HEENT:   NC/AT, PERRLA, EOMI.  Cardiovascular: unlabored breathing, no peripheral cyanosis or swelling.  Lungs:   no respiratory distress.  Abdomen: non- distended.  Extremities:  No LE " swelling.  Skin:  Warm, dry.  No erythema. No rash.   Neurologic: Alert & oriented x 3. CN II-XII grossly intact. No focal deficits.  Psychiatric:  Affect normal, mood normal, judgment normal.    Labs     Labs are reviewed and discussed with a patient  Lab Results   Component Value Date/Time    CHOLSTRLTOT 180 06/12/2021 02:10 AM    LDL 96 06/12/2021 02:10 AM    HDL 71 06/12/2021 02:10 AM    TRIGLYCERIDE 65 06/12/2021 02:10 AM       Lab Results   Component Value Date/Time    SODIUM 128 (L) 06/12/2021 02:10 AM    POTASSIUM 3.6 06/12/2021 02:10 AM    CHLORIDE 94 (L) 06/12/2021 02:10 AM    CO2 26 06/12/2021 02:10 AM    GLUCOSE 95 06/12/2021 02:10 AM    BUN 11 06/12/2021 02:10 AM    CREATININE 0.44 (L) 06/12/2021 02:10 AM    CREATININE 0.6 05/02/2007 09:18 AM     Lab Results   Component Value Date/Time    ALKPHOSPHAT 92 06/11/2021 02:58 PM    ASTSGOT 31 06/11/2021 02:58 PM    ALTSGPT 22 06/11/2021 02:58 PM    TBILIRUBIN 0.8 06/11/2021 02:58 PM      Lab Results   Component Value Date/Time    HBA1C 5.1 06/11/2021 02:58 PM    HBA1C 5.2 10/24/2020 09:37 AM     No results found for: TSH  No results found for: FREET4    Lab Results   Component Value Date/Time    WBC 4.3 (L) 06/12/2021 02:10 AM    RBC 4.12 (L) 06/12/2021 02:10 AM    HEMOGLOBIN 12.7 06/12/2021 02:10 AM    HEMATOCRIT 36.4 (L) 06/12/2021 02:10 AM    MCV 88.3 06/12/2021 02:10 AM    MCH 30.8 06/12/2021 02:10 AM    MCHC 34.9 06/12/2021 02:10 AM    MPV 8.8 (L) 06/12/2021 02:10 AM    NEUTSPOLYS 58.80 06/12/2021 02:10 AM    LYMPHOCYTES 28.10 06/12/2021 02:10 AM    MONOCYTES 10.30 06/12/2021 02:10 AM    EOSINOPHILS 1.90 06/12/2021 02:10 AM    BASOPHILS 0.70 06/12/2021 02:10 AM    ANISOCYTOSIS 1+ 05/05/2016 04:10 AM      Imaging     Reviewed and discussed per HPI    Assessment and Plan     Alysadiana Sarabia is a 70 y.o. female    1. Hospital discharge follow-up  2. Vertigo resolved  3. Essential hypertension controlled  4. Hyponatremia on supplement  5. Hypokalemia  resolved  The patient has been stable after discharge, controlled blood pressure, compliant with medications    Counseling:   - Smoking:  Nonsmoker    Followup: Return if symptoms worsen or fail to improve.    All questions are answered.    Please note that this dictation was created using voice recognition software, and that there might be errors of ashok and possibly content.

## 2021-08-09 ENCOUNTER — TELEPHONE (OUTPATIENT)
Dept: MEDICAL GROUP | Age: 71
End: 2021-08-09

## 2021-08-09 NOTE — TELEPHONE ENCOUNTER
ANNUAL WELLNESS VISIT PRE-VISIT PLANNING      2nd attempt:Tuesday, 08/11/2021@9:50AM  Phone Number Called: [phone] (home)   Call outcome: Spoke to patient regarding message below.  Message: Advised appointment scheduled with  Tuesday, 8/17/2021@4:00PM, [address]. Request patient arrive 20-minutes early for Check-in. Patient declined Virtual visit.      1st attempt: Monday, 08/09/2021@2:16PM  Left message for patient to call back regarding pre-visit planning. Please transfer call to [phone].    Phone Number Called: [phone] (home)   Call outcome: No answer. Left Voice Mail.  Message: Scheduled with  Tuesday, 8/17/2021@4:00PM, [address]. Advised patient to arrive 20-minutes early for Check-in. Gave Virtual visit option.    1.  Reviewed notes from the last office visit: Yes    2.  If any orders were ordered or intended to be done prior to visit (i.e. 6 mos follow-up), do we have results/consult notes or has patient scheduled?        •  Labs - Labs ordered, completed on 05/01/2021 and results are in chart.  Note: If patient appointment is for lab review and patient did not complete labs, check with provider if OK to reschedule patient until labs completed.       •  Imaging - Imaging ordered, completed and results are in chart.       •  Referrals - No referrals were ordered at last office visit.    3.  Immunizations were updated in Epic using Reconcile Outside Information activity? Yes       •  Is patient due for Tdap? NO       •  Is patient due for Shingrix? NO    4.  Patient is due for the following Health Maintenance Topics:   There are no preventive care reminders to display for this patient.    5.  Reviewed/Updated the following with patient:       •   Preferred Pharmacy? Yes       •   Preferred Lab? Yes       •   Preferred Communication? Yes       •   Allergies? Yes       •   Medications? Yes       •   Social History? Yes       •   Family History (document living status of  immediate family members and if + hx of  cancer, diabetes, hypertension, hyperlipidemia, heart attack, stroke) Yes    6.  Care Team Updated:       •   DME Company (gait device, O2, CPAP, etc.): None, per patient       •   Other Specialists (eye doctor, derm, GYN, cardiology, endo, etc): Yes, updated Care Teams with Provider SAURAV Knutson and requested medical records via fax.    7.  Patient was advised: “This is a free wellness visit. The provider will screen for medical conditions to help you stay healthy. If you have other concerns to address you may be asked to discuss these at a separate visit or there may be an additional fee.”       8.  AHA (Puls8) form printed for Provider? No, patient does not have any open alerts

## 2021-08-09 NOTE — LETTER
[facility]  Darvin Brennan M.D.  [address]   [place] [address]  Fax: [phone]   Authorization for Release/Disclosure of   Protected Health Information   Name: ALYSA SALAZAR : 1950 SSN: [id number]   Address: [address] [place] [address]  [place] [address] Phone:    [phone] (home)    I authorize the entity listed below to release/disclose the PHI below to:   [facility]/Darvin Brennan M.D. and Darvin Brennan M.D.   Provider or Entity Name:  CAROL Knutson   Address   [facility], [facility], [facility]   Phone:      Fax:  [phone]   Reason for request: continuity of care   Information to be released:    [  ] LAST COLONOSCOPY,  including any PATH REPORT and follow-up  [  ] LAST FIT/COLOGUARD RESULT [  ] LAST DEXA  [  ] LAST MAMMOGRAM  [  ] LAST PAP  [  ] LAST LABS [  ] RETINA EXAM REPORT  [  ] IMMUNIZATION RECORDS  [XXX] Release all info      [  ] Check here and initial the line next to each item to release ALL health information INCLUDING  _____ Care and treatment for drug and / or alcohol abuse  _____ HIV testing, infection status, or AIDS  _____ Genetic Testing    DATES OF SERVICE OR TIME PERIOD TO BE DISCLOSED: _____________  I understand and acknowledge that:  * This Authorization may be revoked at any time by you in writing, except if your health information has already been used or disclosed.  * Your health information that will be used or disclosed as a result of you signing this authorization could be re-disclosed by the recipient. If this occurs, your re-disclosed health information may no longer be protected by State or Federal laws.  * You may refuse to sign this Authorization. Your refusal will not affect your ability to obtain treatment.  * This Authorization becomes effective upon signing and will  on (date) __________.      If no date is indicated, this Authorization will  one (1) year from the signature date.    Name: Alysa Mays  [place]    Signature: Continuity of Care   Date:     8/11/2021       PLEASE FAX REQUESTED RECORDS BACK TO: [phone]

## 2021-08-11 NOTE — PROGRESS NOTES
ANNUAL WELLNESS VISIT PRE-VISIT PLANNING        2nd attempt:Tuesday, 08/11/2021@9:50AM  Phone Number Called: [phone] (home)   Call outcome: Spoke to patient regarding message below.  Message: Advised appointment scheduled with  Tuesday, 8/17/2021@4:00PM, [address]. Request patient arrive 20-minutes early for Check-in. Patient declined Virtual visit.        1st attempt: Monday, 08/09/2021@2:16PM  Left message for patient to call back regarding pre-visit planning. Please transfer call to [phone].     Phone Number Called: [phone] (home)   Call outcome: No answer. Left Voice Mail.  Message: Scheduled with  Tuesday, 8/17/2021@4:00PM, [address]. Advised patient to arrive 20-minutes early for Check-in. Gave Virtual visit option.     1.  Reviewed notes from the last office visit: Yes     2.  If any orders were ordered or intended to be done prior to visit (i.e. 6 mos follow-up), do we have results/consult notes or has patient scheduled?        •  Labs - Labs ordered, completed on 05/01/2021 and results are in chart.  Note: If patient appointment is for lab review and patient did not complete labs, check with provider if OK to reschedule patient until labs completed.       •  Imaging - Imaging ordered, completed and results are in chart.       •  Referrals - No referrals were ordered at last office visit.     3.  Immunizations were updated in Epic using Reconcile Outside Information activity? Yes       •  Is patient due for Tdap? NO       •  Is patient due for Shingrix? NO     4.  Patient is due for the following Health Maintenance Topics:   There are no preventive care reminders to display for this patient.     5.  Reviewed/Updated the following with patient:       •   Preferred Pharmacy? Yes       •   Preferred Lab? Yes       •   Preferred Communication? Yes       •   Allergies? Yes       •   Medications? Yes       •   Social History? Yes       •   Family History (document living  status of immediate family members and if + hx of        cancer, diabetes, hypertension, hyperlipidemia, heart attack, stroke) Yes     6.  Care Team Updated:       •   DME Company (gait device, O2, CPAP, etc.): None, per patient       •   Other Specialists (eye doctor, derm, GYN, cardiology, endo, etc): Yes, updated Care Teams with Provider SAURAV Knutson and requested medical records via fax.     7.  Patient was advised: “This is a free wellness visit. The provider will screen for medical conditions to help you stay healthy. If you have other concerns to address you may be asked to discuss these at a separate visit or there may be an additional fee.”         8.  AHA (Puls8) form printed for Provider? No, patient does not have any open alerts

## 2021-08-17 ENCOUNTER — TELEMEDICINE (OUTPATIENT)
Dept: MEDICAL GROUP | Age: 71
End: 2021-08-17
Payer: MEDICARE

## 2021-08-17 VITALS
BODY MASS INDEX: 20.24 KG/M2 | OXYGEN SATURATION: 96 % | SYSTOLIC BLOOD PRESSURE: 136 MMHG | HEART RATE: 69 BPM | WEIGHT: 110 LBS | DIASTOLIC BLOOD PRESSURE: 88 MMHG | HEIGHT: 62 IN | TEMPERATURE: 97.9 F

## 2021-08-17 DIAGNOSIS — E55.9 HYPOVITAMINOSIS D: ICD-10-CM

## 2021-08-17 DIAGNOSIS — E87.1 HYPONATREMIA: ICD-10-CM

## 2021-08-17 DIAGNOSIS — I10 ESSENTIAL HYPERTENSION: ICD-10-CM

## 2021-08-17 DIAGNOSIS — D70.9 NEUTROPENIA, UNSPECIFIED TYPE (HCC): ICD-10-CM

## 2021-08-17 PROCEDURE — 99214 OFFICE O/P EST MOD 30 MIN: CPT | Mod: 95 | Performed by: INTERNAL MEDICINE

## 2021-08-17 RX ORDER — MECLIZINE HCL 12.5 MG/1
12.5 TABLET ORAL 3 TIMES DAILY PRN
COMMUNITY
End: 2021-10-21

## 2021-08-17 RX ORDER — AMLODIPINE BESYLATE 5 MG/1
5 TABLET ORAL DAILY
Qty: 90 TABLET | Refills: 0 | Status: SHIPPED | OUTPATIENT
Start: 2021-08-17 | End: 2021-10-21

## 2021-08-17 ASSESSMENT — FIBROSIS 4 INDEX: FIB4 SCORE: 1.41

## 2021-08-17 NOTE — PROGRESS NOTES
Telemedicine Visit: Established Patient     This Remote Face to Face encounter was conducted via Zoom. Given the importance of social distancing and other strategies recommended to reduce the risk of COVID-19 transmission, I am providing medical care to this patient via audio/video visit in place of an in person visit at the request of the patient. Verbal consent to telehealth, risks, benefits, and consequences were discussed. Patient retains the right to withdraw at any time. All existing confidentiality protections apply. The patient has access to all transmitted medical information. No dissemination of any patient images or information to other entities without further written consent.    CHIEF COMPLAINT     Uncontrolled hypertension    HPI  Alysa Sarabia is a 71 y.o. female who presents today for the following     Hypertension uncontrolled  Chronic hyponatremia  Interval course:  - Hospitalized in 6/2021, 2 months ago   - d/c HCTZ and Losartan   -Losartan was subsequently restarted with lower dose and ended up with high dose of 100 mg/day   -Continue to have elevated blood pressure 130-140/80-95     Meds: Losartan 100 mg QD, taking as prescribed.    Measuring BP at home: no  No headaches, vision problems, tinnitus.  No chest pain/pressure, palpitations, irregular heart beats, exertional dyspnea, peripheral edema.  Diet: healthy   Low salt diet: she has additional salt  Exercise: yes  BMI: 20     CXR 6/11/2021  Negative    Hypovitaminosis D  The patient had low vitamin D level.  Vitamin D supplement: normalized.    Neutropenia  The patient has have borderline low WBC count.  Denies frequent infections, lymphadenopathy, anorexia, weight loss.    Reviewed PMH, PSH, FH, SH, ALL, HCM/IMM, no changes  Reviewed MEDS, no changes    Patient Active Problem List    Diagnosis Date Noted   • Age-related osteoporosis without current pathological fracture 10/26/2020   • Hypovitaminosis D 06/18/2020   • Neutropenia  (HCC) 11/04/2019   • Hyponatremia 11/04/2019   • Health care maintenance 04/25/2019   • Hypercholesterolemia 04/25/2019   • Benign liver cyst 07/24/2017   • Essential hypertension 09/03/2015     CURRENT MEDICATIONS  Current Outpatient Medications   Medication Sig Dispense Refill   • LUTEIN PO Take  by mouth.     • alendronate (FOSAMAX) 70 MG Tab Take 70 mg by mouth every 7 days.     • sodium chloride (SALT) 1 GM Tab Take 1 tablet by mouth 3 times a day. 270 tablet 3   • losartan (COZAAR) 100 MG Tab Take 1 Tab by mouth every day. 90 Tab 3   • Multiple Vitamins-Minerals (PRESERVISION AREDS PO) Take 1 tablet by mouth 2 times a day.     • Cholecalciferol (VITAMIN D3) 125 MCG (5000 UT) Tab Take 1 capsule by mouth every day.     • Cyanocobalamin (VITAMIN B-12) 2500 MCG SL Tab Place 1 tablet under the tongue every day.     • ascorbic acid (ASCORBIC ACID) 500 MG Tab Take 500 mg by mouth every day.     • CALCIUM PO Take 1 tablet by mouth every day.       No current facility-administered medications for this visit.     ALLERGIES  Allergies: Patient has no known allergies.  PAST MEDICAL HISTORY  Past Medical History:   Diagnosis Date   • Hydronephrosis    • Hypertension    • Hyponatremia    • Leukopenia    • Liver cyst     benign, no f/u   • Small bowel obstruction (HCC)     R   • Varicose vein of leg      SURGICAL HISTORY  She  has a past surgical history that includes breast biopsy (2007); colon resection laparoscopic (3/27/2014); exploratory laparotomy (5/3/2016); cystoscopy stent placement (Right, 5/13/2016); retrogrades (5/13/2016); exploratory laparotomy (12/14/2016); and lumpectomy (Left, 2005).  SOCIAL HISTORY  Social History     Tobacco Use   • Smoking status: Never Smoker   • Smokeless tobacco: Never Used   Vaping Use   • Vaping Use: Never used   Substance Use Topics   • Alcohol use: Yes     Alcohol/week: 6.0 - 6.6 oz     Types: 3 - 4 Glasses of wine, 7 Standard drinks or equivalent per week     Comment: 4 per week  "  • Drug use: No     Social History     Social History Narrative    Lives with .     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     FAMILY HISTORY  Family History   Problem Relation Age of Onset   • Hypertension Mother    • Heart Disease Father    • Cancer Brother         neck   • Cancer Maternal Grandmother    • No Known Problems Maternal Grandfather    • No Known Problems Paternal Grandmother    • No Known Problems Paternal Grandfather    • Alcohol/Drug Neg Hx    • Allergies Neg Hx    • Diabetes Neg Hx    • Psychiatric Illness Neg Hx    • Stroke Neg Hx    • Thyroid Neg Hx      Family Status   Relation Name Status   • Mo  Alive   • Fa  Alive   • Bro  Alive   • MGMo     • MGFa     • PGMo     • PGFa     • Neg Hx  (Not Specified)       ROS   Constitutional: Negative for  fatigue.  Eyes: Negative for vision problems.   Respiratory: Negative for cough, shortness of breath.  Cardiovascular: Negative for chest pain, palpitations.   Gastrointestinal: Negative for heartburn, nausea, abdominal pain.   Musculoskeletal: Negative for significant myalgia, back and joint pain.   Skin: Negative for rash.   Neuro: Negative for dizziness, weakness and headaches.   Endo/Heme/Allergies: Does not bruise/bleed easily.   Psychiatric/Behavioral: Negative for depression.    Objective   Vitals obtained by patient:  Blood Pressure 136/88 (BP Location: Left arm, Patient Position: Sitting)   Pulse 69   Temperature 36.6 °C (97.9 °F) (Temporal)   Height 1.575 m (5' 2\")   Weight 49.9 kg (110 lb)   Oxygen Saturation 96%   Body Mass Index 20.12 kg/m²   Physical Exam:  Constitutional: Alert, no distress, well-groomed.  Skin: No rash in visible areas.  Eye: Round. Conjunctiva clear, lids normal.  ENMT: Lips pink without lesions, good dentition. Phonation normal.  Neck: No visible masses or thyromegaly. Moves freely without pain.  CV: no peripheral cyanosis, " tachycardia.  Respiratory: Unlabored respiratory effort, no cough or audible wheezing.  Psych: Alert and oriented x3, normal affect and mood.     Labs     Labs are reviewed and discussed with a patient  Lab Results   Component Value Date/Time    CHOLSTRLTOT 180 06/12/2021 02:10 AM    LDL 96 06/12/2021 02:10 AM    HDL 71 06/12/2021 02:10 AM    TRIGLYCERIDE 65 06/12/2021 02:10 AM       Lab Results   Component Value Date/Time    SODIUM 128 (L) 06/12/2021 02:10 AM    POTASSIUM 3.6 06/12/2021 02:10 AM    CHLORIDE 94 (L) 06/12/2021 02:10 AM    CO2 26 06/12/2021 02:10 AM    GLUCOSE 95 06/12/2021 02:10 AM    BUN 11 06/12/2021 02:10 AM    CREATININE 0.44 (L) 06/12/2021 02:10 AM    CREATININE 0.6 05/02/2007 09:18 AM     Lab Results   Component Value Date/Time    ALKPHOSPHAT 92 06/11/2021 02:58 PM    ASTSGOT 31 06/11/2021 02:58 PM    ALTSGPT 22 06/11/2021 02:58 PM    TBILIRUBIN 0.8 06/11/2021 02:58 PM      Lab Results   Component Value Date/Time    HBA1C 5.1 06/11/2021 02:58 PM    HBA1C 5.2 10/24/2020 09:37 AM     No results found for: TSH  No results found for: FREET4    Lab Results   Component Value Date/Time    WBC 4.3 (L) 06/12/2021 02:10 AM    RBC 4.12 (L) 06/12/2021 02:10 AM    HEMOGLOBIN 12.7 06/12/2021 02:10 AM    HEMATOCRIT 36.4 (L) 06/12/2021 02:10 AM    MCV 88.3 06/12/2021 02:10 AM    MCH 30.8 06/12/2021 02:10 AM    MCHC 34.9 06/12/2021 02:10 AM    MPV 8.8 (L) 06/12/2021 02:10 AM    NEUTSPOLYS 58.80 06/12/2021 02:10 AM    LYMPHOCYTES 28.10 06/12/2021 02:10 AM    MONOCYTES 10.30 06/12/2021 02:10 AM    EOSINOPHILS 1.90 06/12/2021 02:10 AM    BASOPHILS 0.70 06/12/2021 02:10 AM    ANISOCYTOSIS 1+ 05/05/2016 04:10 AM      Imaging      Reviewed and discussed per HPI    Assessment and Plan     Alysa Tabatha Sarabia is a 71 y.o. female    1. Essential hypertension  The patient she has have labile/uncontrolled blood pressure, was hospitalized and she continued to be concerned  She is added amlodipine low-dose in the  morning and a follow-up following referrals    - REFERRAL TO PHARMACOTHERAPY SERVICE  - REFERRAL TO VASCULAR MEDICINE  - amLODIPine (NORVASC) 5 MG Tab; Take 1 Tablet by mouth every day.  Dispense: 90 Tablet; Refill: 0    2. Hyponatremia  Stable, continue current supplement, follow-up labs    3. Hypovitaminosis D  - Controlled, continue with current management.    4. Neutropenia, unspecified type (HCC)  Borderline, follow-up labs    Follow-up: According to labs (scheduled for the end of September, in 6 weeks)

## 2021-08-23 DIAGNOSIS — Z20.822 EXPOSURE TO COVID-19 VIRUS: ICD-10-CM

## 2021-08-25 ENCOUNTER — DOCUMENTATION (OUTPATIENT)
Dept: VASCULAR LAB | Facility: MEDICAL CENTER | Age: 71
End: 2021-08-25

## 2021-08-25 NOTE — PROGRESS NOTES
[facility] and Pharmacotherapy [facility]    Received HTN referral from Dr. Brennan on 8/17/21    Pt scheduled for initial visit on 9/27 @ 4pm.    Insurance: [facility]  PCP: [facility]  Locations to be seen: Any    [facility] at [phone], fax [phone]    Caroline DolanD

## 2021-09-21 NOTE — TELEPHONE ENCOUNTER
1. Caller Name: Alysa Sarabia       Call Back Number: [phone] (home)         Patient approves a detailed voicemail message: yes    Dr. Humphrey, Patient called and states that the medication Valsartan has been recalled due to causing cancer. Patient states she saw it on the news and then called the pharmacy and they confirmed it is recalled. Patient is upset and concerns as why we prescribed her something that causes cancer. Informed patient that I would speak with you and see if there is an alternative. Please advise     Thank you    Ayesha Navarrete  Medical Assistant        Patient resting in bed. Respirations easy and unlabored. No distress noted. Call light within reach.        Mariam Carcamo RN  09/20/21 [id number]

## 2021-09-27 ENCOUNTER — NON-PROVIDER VISIT (OUTPATIENT)
Dept: MEDICAL GROUP | Facility: MEDICAL CENTER | Age: 71
End: 2021-09-27
Payer: MEDICARE

## 2021-09-27 VITALS — DIASTOLIC BLOOD PRESSURE: 80 MMHG | SYSTOLIC BLOOD PRESSURE: 120 MMHG | HEART RATE: 68 BPM

## 2021-09-27 PROCEDURE — 99211 OFF/OP EST MAY X REQ PHY/QHP: CPT | Performed by: INTERNAL MEDICINE

## 2021-09-27 NOTE — PROGRESS NOTES
Pharmacotherapy Hypertension Visit  09/27/21     Type of Visit:  Initial Visit  Start Time:3:57  End time:4:22    Alysa Sarabia has been referred for evaluation and management of hypertension    HPI:   Vitals:    09/27/21 1611   BP: 120/80   Pulse: 68       Age at Initial Diagnosis: pt reports a long time ago      Home BP readings:   120/80  Any readings above  180/110:  None   Bhakti symptoms of high blood pressure (TIA, Stroke, Head ache, vision changes): None      Current Prescription HTN Medications - including dose:    Amlodipine 5 mg tablet - 1/2 tablet daily  Losartan 100 mg tablet - 1 daily        Current side effects potentially related to antihypertensive medications (lightheaded, dizziness, leg swelling): None    Current Adherence to Blood Pressure Medications complete    Previusly attempted BP medications:   HCTZ but was stopped by ER physician as     Any current interfering substances: coffee 1/2 cup per day,     Any current lifestyle issues affecting BP:  None    In the past 6 months have pt had the following (if no, then order)  EKG  Microalbumin  Electrolytes and eGFR  TSH  CBC  Fasting lipid panel  Fasting glucose    Since last visit any of the below   Creatinine increase > 0.5  Potassium > 5 or < 3.5  New edema present  Hyponatremia    Lab Results   Component Value Date/Time    SODIUM 128 (L) 06/12/2021 02:10 AM    POTASSIUM 3.6 06/12/2021 02:10 AM    CHLORIDE 94 (L) 06/12/2021 02:10 AM    CO2 26 06/12/2021 02:10 AM    GLUCOSE 95 06/12/2021 02:10 AM    BUN 11 06/12/2021 02:10 AM    CREATININE 0.44 (L) 06/12/2021 02:10 AM    CREATININE 0.6 05/02/2007 09:18 AM         Medications Reconciled  CURRENT MEDICATIONS:   Current Outpatient Medications:   •  amLODIPine, 5 mg, Oral, DAILY (Patient taking differently: 2.5 mg, Oral, DAILY), Taking  •  LUTEIN PO, Take  by mouth., Taking  •  alendronate, 70 mg, Oral, Q7 DAYS, Taking  •  sodium chloride, 1 g, Oral, TID, Taking  •  losartan, 100 mg, Oral,  DAILY, Taking  •  Multiple Vitamins-Minerals (PRESERVISION AREDS PO), 1 Tablet, Oral, BID, Taking  •  Vitamin D3, 1 Capsule, Oral, DAILY, Taking  •  Vitamin B-12, 1 Tablet, Sublingual, DAILY, Taking  •  ascorbic acid, 500 mg, Oral, DAILY, Taking  •  CALCIUM PO, 1 Tablet, Oral, DAILY, Taking  •  meclizine, 12.5 mg, Oral, TID PRN (Patient not taking: Reported on 9/27/2021), Not Taking  ALLERGIES: Patient has no known allergies.    SOCIAL HISTORY   Social History     Tobacco Use   Smoking Status Never Smoker   Smokeless Tobacco Never Used     Change in weight: Stable  Exercise habits: moderate regular exercise program, walks 5 miles per daily  Diet: common adult          ASSESSMENT AND PLAN    BP is  at goal    BP Goal 130/80      BP Monitoring Recommendations - Home BP     Proper technique for blood pressure monitoring reviewed with patient.  Pt instructed to check BP at varying times through out the day 4 times per week.  Provided pt log for blood pressure monitoring and pt instructed to bring in at each visit for reviews    Lifestyle Recommendations From Today’s Visit:    Continue to exercise daily    Medication recommendations from today's visit: continue current regimen  ARB/ACEI: losartan 100 mg daily  Diuretic:   Calcium Channel Blocker: amlodipine 5 mg - 1/2 tablet daily  Other class:    Importance of adherence discussed    Other recommendations: pt sees vascular 10/21/2021    Studies Ordered at Todays Visit: None  Blood Work Ordered At Today’s visit: None, Dr. Humphrey ordered BMP and f/u labs for chronic low sodium  Follow-Up: none as pt sees vascular in 3 weeks and BP is at goal today  Delmy Ma, PharmD    CC:  Radmila M Savcic-Kos, M.D. Dr. Michael Bloch

## 2021-09-27 NOTE — Clinical Note
Dr. Humphrey,     Pt seen for HTN today, BP is at goal with addition of amlodipine. She has f/u with vascular in 3 weeks.     Thank you,    Delmy Ma, PharmD

## 2021-10-21 ENCOUNTER — OFFICE VISIT (OUTPATIENT)
Dept: VASCULAR LAB | Facility: MEDICAL CENTER | Age: 71
End: 2021-10-21
Attending: FAMILY MEDICINE
Payer: MEDICARE

## 2021-10-21 ENCOUNTER — HOSPITAL ENCOUNTER (OUTPATIENT)
Dept: LAB | Facility: MEDICAL CENTER | Age: 71
End: 2021-10-21
Attending: INTERNAL MEDICINE
Payer: MEDICARE

## 2021-10-21 VITALS
SYSTOLIC BLOOD PRESSURE: 132 MMHG | BODY MASS INDEX: 20.98 KG/M2 | HEART RATE: 65 BPM | WEIGHT: 114 LBS | HEIGHT: 62 IN | DIASTOLIC BLOOD PRESSURE: 78 MMHG

## 2021-10-21 DIAGNOSIS — E78.00 HYPERCHOLESTEROLEMIA: ICD-10-CM

## 2021-10-21 DIAGNOSIS — E87.1 HYPONATREMIA: ICD-10-CM

## 2021-10-21 DIAGNOSIS — D70.9 NEUTROPENIA, UNSPECIFIED TYPE (HCC): ICD-10-CM

## 2021-10-21 DIAGNOSIS — I10 ESSENTIAL HYPERTENSION: ICD-10-CM

## 2021-10-21 DIAGNOSIS — R03.0 ELEVATED BLOOD PRESSURE READING WITHOUT DIAGNOSIS OF HYPERTENSION: ICD-10-CM

## 2021-10-21 LAB
ALBUMIN SERPL BCP-MCNC: 4.1 G/DL (ref 3.2–4.9)
ALBUMIN/GLOB SERPL: 1.3 G/DL
ALP SERPL-CCNC: 80 U/L (ref 30–99)
ALT SERPL-CCNC: 19 U/L (ref 2–50)
ANION GAP SERPL CALC-SCNC: 9 MMOL/L (ref 7–16)
AST SERPL-CCNC: 25 U/L (ref 12–45)
BASOPHILS # BLD AUTO: 1.1 % (ref 0–1.8)
BASOPHILS # BLD: 0.04 K/UL (ref 0–0.12)
BILIRUB SERPL-MCNC: 0.9 MG/DL (ref 0.1–1.5)
BUN SERPL-MCNC: 9 MG/DL (ref 8–22)
CALCIUM SERPL-MCNC: 7.4 MG/DL (ref 8.5–10.5)
CHLORIDE SERPL-SCNC: 96 MMOL/L (ref 96–112)
CHOLEST SERPL-MCNC: 174 MG/DL (ref 100–199)
CO2 SERPL-SCNC: 27 MMOL/L (ref 20–33)
CREAT SERPL-MCNC: 0.48 MG/DL (ref 0.5–1.4)
EOSINOPHIL # BLD AUTO: 0.07 K/UL (ref 0–0.51)
EOSINOPHIL NFR BLD: 2 % (ref 0–6.9)
ERYTHROCYTE [DISTWIDTH] IN BLOOD BY AUTOMATED COUNT: 45.6 FL (ref 35.9–50)
FASTING STATUS PATIENT QL REPORTED: NORMAL
GLOBULIN SER CALC-MCNC: 3.1 G/DL (ref 1.9–3.5)
GLUCOSE SERPL-MCNC: 96 MG/DL (ref 65–99)
HCT VFR BLD AUTO: 38.2 % (ref 37–47)
HDLC SERPL-MCNC: 70 MG/DL
HGB BLD-MCNC: 13.4 G/DL (ref 12–16)
IMM GRANULOCYTES # BLD AUTO: 0.01 K/UL (ref 0–0.11)
IMM GRANULOCYTES NFR BLD AUTO: 0.3 % (ref 0–0.9)
LDLC SERPL CALC-MCNC: 90 MG/DL
LYMPHOCYTES # BLD AUTO: 1.22 K/UL (ref 1–4.8)
LYMPHOCYTES NFR BLD: 34.6 % (ref 22–41)
MCH RBC QN AUTO: 32.3 PG (ref 27–33)
MCHC RBC AUTO-ENTMCNC: 35.1 G/DL (ref 33.6–35)
MCV RBC AUTO: 92 FL (ref 81.4–97.8)
MONOCYTES # BLD AUTO: 0.32 K/UL (ref 0–0.85)
MONOCYTES NFR BLD AUTO: 9.1 % (ref 0–13.4)
NEUTROPHILS # BLD AUTO: 1.87 K/UL (ref 2–7.15)
NEUTROPHILS NFR BLD: 52.9 % (ref 44–72)
NRBC # BLD AUTO: 0 K/UL
NRBC BLD-RTO: 0 /100 WBC
OSMOLALITY SERPL: 274 MOSM/KG H2O (ref 278–298)
PLATELET # BLD AUTO: 369 K/UL (ref 164–446)
PMV BLD AUTO: 9.4 FL (ref 9–12.9)
POTASSIUM SERPL-SCNC: 4.3 MMOL/L (ref 3.6–5.5)
PROT SERPL-MCNC: 7.2 G/DL (ref 6–8.2)
RBC # BLD AUTO: 4.15 M/UL (ref 4.2–5.4)
SODIUM SERPL-SCNC: 132 MMOL/L (ref 135–145)
TRIGL SERPL-MCNC: 71 MG/DL (ref 0–149)
WBC # BLD AUTO: 3.5 K/UL (ref 4.8–10.8)

## 2021-10-21 PROCEDURE — 99203 OFFICE O/P NEW LOW 30 MIN: CPT | Performed by: FAMILY MEDICINE

## 2021-10-21 PROCEDURE — 85025 COMPLETE CBC W/AUTO DIFF WBC: CPT

## 2021-10-21 PROCEDURE — 83930 ASSAY OF BLOOD OSMOLALITY: CPT

## 2021-10-21 PROCEDURE — 80061 LIPID PANEL: CPT

## 2021-10-21 PROCEDURE — 36415 COLL VENOUS BLD VENIPUNCTURE: CPT

## 2021-10-21 PROCEDURE — 99212 OFFICE O/P EST SF 10 MIN: CPT

## 2021-10-21 PROCEDURE — 80053 COMPREHEN METABOLIC PANEL: CPT

## 2021-10-21 PROCEDURE — 83935 ASSAY OF URINE OSMOLALITY: CPT

## 2021-10-21 PROCEDURE — 84300 ASSAY OF URINE SODIUM: CPT

## 2021-10-21 RX ORDER — LOSARTAN POTASSIUM 100 MG/1
100 TABLET ORAL DAILY
Qty: 90 TABLET | Refills: 3 | Status: SHIPPED | DISCHARGE
Start: 2021-10-21 | End: 2022-02-05

## 2021-10-21 RX ORDER — AMLODIPINE BESYLATE 2.5 MG/1
2.5 TABLET ORAL
Qty: 90 TABLET | Refills: 3 | Status: SHIPPED | OUTPATIENT
Start: 2021-10-21 | End: 2022-08-10 | Stop reason: SDUPTHER

## 2021-10-21 RX ORDER — LOSARTAN POTASSIUM 100 MG/1
50 TABLET ORAL DAILY
Qty: 90 TABLET | Refills: 3 | Status: SHIPPED | OUTPATIENT
Start: 2021-10-21 | End: 2021-10-21

## 2021-10-21 ASSESSMENT — ENCOUNTER SYMPTOMS
CHILLS: 0
VOMITING: 0
FEVER: 0
FOCAL WEAKNESS: 0
COUGH: 0
HEADACHES: 0
BRUISES/BLEEDS EASILY: 0
ABDOMINAL PAIN: 0
BLOOD IN STOOL: 0
NAUSEA: 0
PALPITATIONS: 0
TREMORS: 0
MYALGIAS: 0
DIZZINESS: 0
WEAKNESS: 0
SHORTNESS OF BREATH: 0
WHEEZING: 0
SEIZURES: 0
CLAUDICATION: 0
DIARRHEA: 0
HEMOPTYSIS: 0

## 2021-10-21 ASSESSMENT — FIBROSIS 4 INDEX: FIB4 SCORE: 1.07

## 2021-10-21 NOTE — PROGRESS NOTES
[facility] - INITIAL EVALUATION   10/21/21     ASSESSEMENT AND PLAN:      1. BLOOD PRESSURE CONTROL   Qualifies as Resistant HTN (RH):  No, BP at goal with <3 meds   Office BP Goal ACC/AHA (2017) goal <130/80  Home BP at goal:  yes  Office BP at goal:  no  24h ABPM:  YES  Device candidate? no  Plan:   Monitoring:   - start/continue home BP monitoring, reviewed correct technique:  Yes   - order 24h ABPM: today   - monitor lytes/gfr routinely   - advised that stabilizing BP may require multiple med changes and close monitoring over the next several months, reviewed that initially there will be additional imaging and labs and the possibility of adverse drug rxn's and variability of his BP and HR until we have things stabilized.  Advised to contact our office as needed for questions or concerns.      2. WORK UP FOR SECONDARY CAUSES OF RH:  Obstructive Sleep Apnea: Excluded  Renal parenchymal disease: Excluded  Renovascular HTN: Not Yet Assessed  Primary Aldosteronism: Not Yet Assessed  Thyroid Function: Excluded  Pheochromocytoma: Not Yet Assessed   Cushing's:   could consider to eval due to low Na    Coarctation of Aorta: excluded  Other: none identified    Recommendations At This Visit: as noted in orders         3. MEDICATION USE / ADHERENCE:   Assessment: Complete  Recommendations: Instructed to Continue Taking All Medications As Prescribed         4. HTN-MEDIATED END-ORGAN DAMAGE:  Left Ventricular Hypertrophy: Absent on  ECG Date: 2021   Urine Albuminuria: None on Date: 2015  Renal Function: Chronic Kidney Disease  CKD G2 at worst   Ophthalmologic: Absent per patient report and/or eye specialist   Established Cardiovascular Disease: None    5. LIFESTYLE INTERVENTIONS:    SMOKING:   reports that she has never smoked. She has never used smokeless tobacco.   - continued complete avoidance of all tobacco products     PHYSICAL ACTIVITY: continue healthy activity to improve CV fitness.  In general, targeting  >150min/week of moderate-level activity.  Additional details reviewed with patient and/or outlined in care instructions     WEIGHT MANAGEMENT AND NUTRITION: Dietary plan was discussed with patient at this visit including Mediterrean and/or DASH-dietary approaches, substitution of MUFA/PUFA for saturated fats, substituting whole grains for simple carbs, substituting lean meats for fatty meats, increase use of plant-based protein vs animal-based, lowered sodium.  Additional details reviewed with patient and/or outlined in care instructions   - recommended <2,000mg/day but concerned about ongoing use of NaCl tabs that are >3g/day - reviewed that this is unfortunately counter-productive to BP mgmt standards of care     6. STANDARD HTN PHARMACOTHERAPY:  ACEI/ARB: continue losartan 100mg QHS  Thiazide Type Diuretic: avoid due to chronic hypoNa   DHP-CCB: continue amlodipine 2.5mg QHS     7. ADDITIONAL AGENTS   Aldosterone Receptor Antagonist: avoid   Loop Diuretic: not indicated   Peripheral Alpha Blocker: not indicated   BB: not indicated   Non-DHP-CCB: not indicated   Direct Vasodilator: not indicated   Centrally Acting Alpha Agonist: not indicated   Potassium-sparing diuretic: not indicated   DRI: not indicated     LIPID MANAGEMENT:   Qualifies for Statin Therapy Based on 2018 ACC/AHA Guidelines: no  The 10-year ASCVD risk score (Suman OMERO Jr., et al., 2013) is: 10.9%  Major ASCVD events: None  High-risk conditions: None   Risk-enhancers: N/A  Currently on Statin: No  Treatment goals: LDL-C <100 (consider non-HDL-C <130, apoB <90)  At goal? N/A  Plan:   - reinforced ongoing TLC measures as noted   - defer lab surveillance to PCP   Meds: none at this time     GLYCEMIA MANAGEMENT:  Normal    ANTIPLATELET/ANTICOAGULANT THERAPY:   - not indicated     OTHER ISSUES:     # hyponatremia, moderate, no symptoms   - ongoing care with PCP, defer mgmt with PCP and consider eval with nephrology for further evaluation  - use of NaCl  tab is counter-productive to BP mgmt    # hypocalcemia - no symptoms  - defer w/u to PCP and/or nephrology if needed     Studies Ordered At This Visit:  24h ABPM    Blood Work to Be Obtained Prior to Next Visit: as noted below   Disposition: RTC in 1 months    1. Essential hypertension  losartan (COZAAR) 100 MG Tab    amLODIPine (NORVASC) 2.5 MG Tab    REFERRAL TO 24-HOUR BLOOD PRESSURE MONITORING    DISCONTINUED: losartan (COZAAR) 100 MG Tab   2. Elevated blood pressure reading without diagnosis of hypertension  REFERRAL TO 24-HOUR BLOOD PRESSURE MONITORING   3. Hyponatremia           History of Present Illness:   Alysa Sarabia is a female seen for evaluation of resistant HTN and management.   Alysa Sarabia is initially referred by Darvin Brennan, *     HTN:  Current/interval HTN concerns:  Denies   Current ADRs: No  HTN sx:  No current blurred or changed vision, chest pain, shortness of breath, headache, nausea, dizziness/vertigo   Home BP log: not checking   24h ABPM completed: not tested  Adherence to current HTN meds: compliant all of the time  Hx of clinical ASCVD events: None   Lifestyle factors:  Weight Change: stable   BMI Readings from Last 5 Encounters:   10/21/21 20.85 kg/m²   08/17/21 20.12 kg/m²   06/16/21 20.85 kg/m²   06/11/21 21.45 kg/m²   05/04/21 20.12 kg/m²     Diet pattern: common adult, low sodium  Daily salt intake estimate:  High   Due to NaCl tab   EtOH: No  Exercise habits: minimal exercise  Perceived barriers to care: none   Pertinent HTN hx (reviewed at initial visit):   Age at HTN dx: >10yrs   (if female, any hx of pregnancy-related HTN): n/a  Past HTN medications: as noted   HTN crises:  No prior hospitalization or crises   Interfering substances/contributing factors:  NaCl tabs     Hyperlipidemia:    No prior dx or meds     Antiplatelet/anticoagulation:   No    Type 2 DM:  No     CKD:    No     Sleeping disorder/MORIAH:   No     Hypothyroidism:   No      Problem  List:     Patient Active Problem List    Diagnosis Date Noted   • Age-related osteoporosis without current pathological fracture 10/26/2020   • Neutropenia (HCC) 11/04/2019   • Hyponatremia 11/04/2019   • Health care maintenance 04/25/2019   • Hypercholesterolemia 04/25/2019   • Benign liver cyst 07/24/2017   • Essential hypertension 09/03/2015      Surgical History:     Past Surgical History:   Procedure Laterality Date   • EXPLORATORY LAPAROTOMY  12/14/2016    Procedure: EXPLORATORY LAPAROTOMY, EXTENSIVE ADHESIOLYSIS, SMALL BOWEL ANASTOMOSIS;  Surgeon: Mac Reddy M.D.;  Location: [facility];  Service:    • CYSTOSCOPY STENT PLACEMENT Right 5/13/2016    Procedure: CYSTOSCOPY STENT PLACEMENT;  Surgeon: Fabrizio Sun M.D.;  Location: [facility];  Service:    • RETROGRADES  5/13/2016    Procedure: RETROGRADES;  Surgeon: Fabrizio Sun M.D.;  Location: [facility];  Service:    • EXPLORATORY LAPAROTOMY  5/3/2016    Procedure: EXPLORATORY LAPAROTOMY- Bowel obstruction;  Surgeon: Mac Reddy M.D.;  Location: [facility];  Service:    • COLON RESECTION LAPAROSCOPIC  3/27/2014    Performed by Mac Reddy M.D. at [facility]   • BREAST BIOPSY  2007   • LUMPECTOMY Left 2005    benign       Current Outpatient Medications:   •  losartan, 100 mg, Oral, DAILY  •  amLODIPine, 2.5 mg, Oral, QHS  •  LUTEIN PO, Take  by mouth., Taking  •  alendronate, 70 mg, Oral, Q7 DAYS, Taking  •  sodium chloride, 1 g, Oral, TID, Taking  •  Multiple Vitamins-Minerals (PRESERVISION AREDS PO), 1 Tablet, Oral, BID, Taking  •  Vitamin D3, 1 Capsule, Oral, DAILY, Taking  •  Vitamin B-12, 1 Tablet, Sublingual, DAILY, Taking  •  ascorbic acid, 500 mg, Oral, DAILY, Taking  •  CALCIUM PO, 1 Tablet, Oral, DAILY, Taking   Patient has no known allergies.     Family History:     Family History   Problem Relation Age of Onset   • Hypertension Mother    • Heart Disease Father    • Cancer  "Brother         neck   • Cancer Maternal Grandmother    • No Known Problems Maternal Grandfather    • No Known Problems Paternal Grandmother    • No Known Problems Paternal Grandfather    • Alcohol/Drug Neg Hx    • Allergies Neg Hx    • Diabetes Neg Hx    • Psychiatric Illness Neg Hx    • Stroke Neg Hx    • Thyroid Neg Hx       Social History:     Social History     Tobacco Use   • Smoking status: Never Smoker   • Smokeless tobacco: Never Used   Vaping Use   • Vaping Use: Never used   Substance Use Topics   • Alcohol use: Yes     Alcohol/week: 1.8 oz     Types: 3 Glasses of wine per week     Comment: 3 a week   • Drug use: No       Review of Systems:   Review of Systems   Constitutional: Negative for chills, fever and malaise/fatigue.   HENT: Negative for nosebleeds.    Respiratory: Negative for cough, hemoptysis, shortness of breath and wheezing.    Cardiovascular: Negative for chest pain, palpitations, claudication and leg swelling.   Gastrointestinal: Negative for abdominal pain, blood in stool, diarrhea, nausea and vomiting.   Musculoskeletal: Negative for joint pain and myalgias.   Skin: Negative for itching and rash.   Neurological: Negative for dizziness, tremors, focal weakness, seizures, weakness and headaches.   Endo/Heme/Allergies: Does not bruise/bleed easily.        Objective:     Vitals:    10/21/21 1533 10/21/21 1537   BP: 136/76 132/78   BP Location: Right arm Right arm   Patient Position: Sitting Sitting   BP Cuff Size: Adult Adult   Pulse: 65 65   Weight: 51.7 kg (114 lb)    Height: 1.575 m (5' 2\")      Body mass index is 20.85 kg/m².  BP Readings from Last 5 Encounters:   10/21/21 132/78   09/27/21 120/80   08/17/21 136/88   06/16/21 122/70   06/12/21 130/86      Physical Exam  Vitals reviewed.   Constitutional:       General: She is not in acute distress.     Appearance: Normal appearance.   HENT:      Head: Normocephalic and atraumatic.   Eyes:      General: Lids are normal.      Extraocular " Movements: Extraocular movements intact.      Conjunctiva/sclera: Conjunctivae normal.   Neck:      Thyroid: No thyroid mass.      Vascular: Normal carotid pulses. No carotid bruit.      Trachea: Trachea normal.   Cardiovascular:      Rate and Rhythm: Normal rate and regular rhythm.      Chest Wall: PMI is not displaced.      Pulses: Normal pulses.           Carotid pulses are 2+ on the right side and 2+ on the left side.       Radial pulses are 2+ on the right side and 2+ on the left side.        Dorsalis pedis pulses are 2+ on the right side and 2+ on the left side.        Posterior tibial pulses are 2+ on the right side and 2+ on the left side.      Heart sounds: Normal heart sounds.      Comments: Stemmer's sign - negative bilat   Spider telangectasia:       RLE:  None      LLE: none   Varicosities:           RLE: none      LLE: none   Corona phlebectatica:      RLE:  None        LLE:  None   Cording:         RLE:  None     LLE: None         Pulmonary:      Effort: Pulmonary effort is normal.      Breath sounds: Normal breath sounds.   Musculoskeletal:      Cervical back: Full passive range of motion without pain and neck supple.      Right lower leg: No edema.      Left lower leg: No edema.   Skin:     General: Skin is warm and dry.      Capillary Refill: Capillary refill takes less than 2 seconds.      Coloration: Skin is not cyanotic.      Nails: There is no clubbing.   Neurological:      General: No focal deficit present.      Mental Status: She is alert and oriented to person, place, and time. Mental status is at baseline.      Cranial Nerves: Cranial nerves are intact.      Coordination: Coordination is intact.      Gait: Gait is intact.   Psychiatric:         Mood and Affect: Mood normal.         Behavior: Behavior normal.        Accessory Clinical Findings:     Lab Results   Component Value Date    CHOLSTRLTOT 174 10/21/2021    LDL 90 10/21/2021    HDL 70 10/21/2021    TRIGLYCERIDE 71 10/21/2021      Lab  Results   Component Value Date    PROTHROMBTM 13.4 2021    INR 1.05 2021       Lab Results   Component Value Date    HBA1C 5.1 2021      Lab Results   Component Value Date    SODIUM 132 (L) 10/21/2021    POTASSIUM 4.3 10/21/2021    CHLORIDE 96 10/21/2021    CO2 27 10/21/2021    GLUCOSE 96 10/21/2021    BUN 9 10/21/2021    CREATININE 0.48 (L) 10/21/2021       Ref. Range 10/21/2021 06:51   Osmolality Serum Latest Ref Range: 278 - 298 mOsm/kg H2O 274 (L)            VASCULAR IMAGING:     Last EKG:   Results for orders placed or performed during the hospital encounter of 21   EKG (NOW)   Result Value Ref Range    Report       [facility] Emergency Dept.    Test Date:  2021  Pt Name:    AQUILINO SALAZAR            Department: ER  MRN:        [id number]                      Room:  Gender:     Female                       Technician: [id number]  :        1950                   Requested By:ER TRIAGE PROTOCOL  Order #:    [id number]                    Reading MD: ALEJANDRINA ALLEN MD    Measurements  Intervals                                Axis  Rate:       76                           P:          64  IL:         168                          QRS:        6  QRSD:       104                          T:          56  QT:         412  QTc:        464    Interpretive Statements  SINUS RHYTHM  LOW VOLTAGE IN FRONTAL LEADS  Compared to ECG 2016 20:33:41  Right-axis deviation no longer present  T-wave abnormality no longer present  Electronically Signed On 2021 17:22:43 PDT by MD Davie BOWER M.D.  [facility]   [facility]   [phone]

## 2021-10-22 LAB
OSMOLALITY UR: 386 MOSM/KG H2O (ref 300–900)
SODIUM UR-SCNC: 80 MMOL/L

## 2021-10-27 ENCOUNTER — TELEPHONE (OUTPATIENT)
Dept: VASCULAR LAB | Facility: MEDICAL CENTER | Age: 71
End: 2021-10-27

## 2021-10-27 ENCOUNTER — PATIENT MESSAGE (OUTPATIENT)
Dept: VASCULAR LAB | Facility: MEDICAL CENTER | Age: 71
End: 2021-10-27

## 2021-10-27 NOTE — TELEPHONE ENCOUNTER
LM- Notified pt of Dr. Hernández's message below.   ----- Message from Davie Hernández M.D. sent at 10/27/2021 10:26 AM PDT -----  Regarding: FW: Referral:   [id number]   24 - Hour Blood Pressure Monitoring  I advised her that taking sodium tablets likely raises her BP and is counter-productive to our attempts to control her HTN.  She needs to talk with her PCP about whether she needs and what further w/u for low sodium she needs (it is in my notes).      Not sure what referral she is talking about - maybe the 24h ABPM?      Thanks   ----- Message -----  From: Bridget Garcia, Med Ass't  Sent: 10/27/2021   9:11 AM PDT  To: Davie Hernández M.D.  Subject: FW: Referral:   [id number]   24 - Hour Blood#    I couldn't find anything in your note regarding sodium tablets.   Please advise.  Thanks!  ----- Message -----  From: Alysa Sarabia  Sent: 10/27/2021   8:05 AM PDT  To: [facility]  Subject: Referral:   [id number]   24 - Hour Blood Pre#    Good Morning Dr. Hernández,                       Where do I go to take care of this referral. I am sorry, but I don't quite understand this.  The referral ID is [id number].  I thought I had to get it at [facility].                 Also, should I continues taking the sodium chloride supplement?    Thank you very much,  Alysa Sarabia

## 2021-10-27 NOTE — TELEPHONE ENCOUNTER
----- Message from Alysa Sarabia sent at 10/27/2021  8:05 AM PDT -----  Regarding: Referral:   [id number]   24 - Hour Blood Pressure Monitoring  Good Morning Dr. Hernández,                       Where do I go to take care of this referral. I am sorry, but I don't quite understand this.  The referral ID is [id number].  I thought I had to get it at [facility].                 Also, should I continues taking the sodium chloride supplement?    Thank you very much,  Alysa Sarabia

## 2021-11-01 ENCOUNTER — HOSPITAL ENCOUNTER (OUTPATIENT)
Dept: RADIOLOGY | Facility: MEDICAL CENTER | Age: 71
End: 2021-11-01
Attending: INTERNAL MEDICINE
Payer: MEDICARE

## 2021-11-01 DIAGNOSIS — E87.1 HYPONATREMIA: ICD-10-CM

## 2021-11-01 DIAGNOSIS — R06.02 SOB (SHORTNESS OF BREATH): ICD-10-CM

## 2021-11-01 DIAGNOSIS — I10 ESSENTIAL HYPERTENSION: ICD-10-CM

## 2021-11-01 PROCEDURE — 71046 X-RAY EXAM CHEST 2 VIEWS: CPT

## 2021-11-03 ENCOUNTER — NON-PROVIDER VISIT (OUTPATIENT)
Dept: VASCULAR LAB | Facility: MEDICAL CENTER | Age: 71
End: 2021-11-03
Attending: INTERNAL MEDICINE
Payer: MEDICARE

## 2021-11-03 DIAGNOSIS — I10 ESSENTIAL HYPERTENSION: ICD-10-CM

## 2021-11-03 PROCEDURE — 93790 AMBL BP MNTR W/SW I&R: CPT | Performed by: INTERNAL MEDICINE

## 2021-11-03 PROCEDURE — 93786 AMBL BP MNTR W/SW REC ONLY: CPT

## 2021-11-03 PROCEDURE — 93788 AMBL BP MNTR W/SW A/R: CPT

## 2021-11-03 NOTE — NON-PROVIDER
24 hour blood pressure monitor placed on patient at 1600. Patient to return machine tomorrow around the same time.

## 2021-11-08 ENCOUNTER — OFFICE VISIT (OUTPATIENT)
Dept: MEDICAL GROUP | Age: 71
End: 2021-11-08
Payer: MEDICARE

## 2021-11-08 VITALS
DIASTOLIC BLOOD PRESSURE: 78 MMHG | HEIGHT: 62 IN | HEART RATE: 70 BPM | WEIGHT: 111 LBS | SYSTOLIC BLOOD PRESSURE: 112 MMHG | OXYGEN SATURATION: 97 % | TEMPERATURE: 98.6 F | BODY MASS INDEX: 20.43 KG/M2

## 2021-11-08 DIAGNOSIS — Z00.00 HEALTH CARE MAINTENANCE: ICD-10-CM

## 2021-11-08 DIAGNOSIS — I10 ESSENTIAL HYPERTENSION: ICD-10-CM

## 2021-11-08 DIAGNOSIS — F41.9 ANXIETY: ICD-10-CM

## 2021-11-08 DIAGNOSIS — E78.00 HYPERCHOLESTEROLEMIA: ICD-10-CM

## 2021-11-08 DIAGNOSIS — E87.1 HYPONATREMIA: ICD-10-CM

## 2021-11-08 DIAGNOSIS — Z00.00 MEDICARE ANNUAL WELLNESS VISIT, SUBSEQUENT: ICD-10-CM

## 2021-11-08 DIAGNOSIS — D70.9 NEUTROPENIA, UNSPECIFIED TYPE (HCC): ICD-10-CM

## 2021-11-08 DIAGNOSIS — K76.89 BENIGN LIVER CYST: ICD-10-CM

## 2021-11-08 DIAGNOSIS — D64.9 ANEMIA, UNSPECIFIED TYPE: ICD-10-CM

## 2021-11-08 DIAGNOSIS — Z23 NEED FOR VACCINATION: ICD-10-CM

## 2021-11-08 DIAGNOSIS — M81.0 AGE-RELATED OSTEOPOROSIS WITHOUT CURRENT PATHOLOGICAL FRACTURE: ICD-10-CM

## 2021-11-08 PROCEDURE — G0439 PPPS, SUBSEQ VISIT: HCPCS | Mod: 25 | Performed by: INTERNAL MEDICINE

## 2021-11-08 PROCEDURE — G0008 ADMIN INFLUENZA VIRUS VAC: HCPCS | Performed by: INTERNAL MEDICINE

## 2021-11-08 PROCEDURE — 90662 IIV NO PRSV INCREASED AG IM: CPT | Performed by: INTERNAL MEDICINE

## 2021-11-08 RX ORDER — FLUOXETINE 10 MG/1
10 CAPSULE ORAL DAILY
Qty: 100 CAPSULE | Refills: 0 | Status: SHIPPED | OUTPATIENT
Start: 2021-11-08 | End: 2022-01-18

## 2021-11-08 ASSESSMENT — ACTIVITIES OF DAILY LIVING (ADL): BATHING_REQUIRES_ASSISTANCE: 0

## 2021-11-08 ASSESSMENT — ENCOUNTER SYMPTOMS: GENERAL WELL-BEING: GOOD

## 2021-11-08 ASSESSMENT — PATIENT HEALTH QUESTIONNAIRE - PHQ9: CLINICAL INTERPRETATION OF PHQ2 SCORE: 0

## 2021-11-08 ASSESSMENT — FIBROSIS 4 INDEX: FIB4 SCORE: 1.07

## 2021-11-08 NOTE — PROGRESS NOTES
Ambulatory blood pressure monitor results reviewed  Full report under media tab  Reasonable data acquisition    Mean daytime: 113/76 c/w reasonable out of office bp control    Nocturnal dip: exaggerated with multiple nighttime readings <90 systolic    Clinical correlation needed.  Will d/w patient at f/u visit    Michael Bloch, MD  [facility]

## 2021-11-08 NOTE — PROGRESS NOTES
Chief Complaint   Patient presents with   • Annual Exam   • Hypertension     HPI:  Alysa Sarabia is a 71 y.o. here for Medicare Annual Wellness Visit        Hypertension  Chronic hyponatremia  Interval course:  -Evaluated by vascular medicine, added amlodipine  -Continues to be on sodium supplement, 3 g/day    - Hospitalized in 6/2021, 2 months ago              - d/c HCTZ and Losartan              -Losartan was subsequently restarted with lower dose and ended up with high dose of 100 mg/day     Meds: Losartan 100 mg QD, amlodipine, 2.5 mg daily, taking as prescribed.    Measuring BP at home: no  No headaches, vision problems, tinnitus.  No chest pain/pressure, palpitations, irregular heart beats, exertional dyspnea, peripheral edema.  Diet: healthy   Low salt diet: she has additional salt  Exercise: yes  BMI: 20     CXR 6/11/2021  Negative    Hypercholesterolemia  The patient had borderline high cholesterol in the past, normalized with diet and exercise  Diet / Exercise/BMI:  As above  FH: Unknown    Neutropenia, anemia  The patient has have borderline low WBC count, and RBC count/normal MCV.  Denies frequent infections, lymphadenopathy, anorexia, weight loss.  The last colonoscopy was normal in 2013, 8 years ago.    Patient Active Problem List    Diagnosis Date Noted   • Age-related osteoporosis without current pathological fracture 10/26/2020   • Neutropenia (HCC) 11/04/2019   • Hyponatremia 11/04/2019   • Health care maintenance 04/25/2019   • Hypercholesterolemia 04/25/2019   • Benign liver cyst 07/24/2017   • Essential hypertension 09/03/2015     Current Outpatient Medications   Medication Sig Dispense Refill   • FLUoxetine (PROZAC) 10 MG Cap Take 1 Capsule by mouth every day. 100 Capsule 0   • losartan (COZAAR) 100 MG Tab Take 1 Tablet by mouth every day. 90 Tablet 3   • amLODIPine (NORVASC) 2.5 MG Tab Take 1 Tablet by mouth at bedtime for 360 days. 90 Tablet 3   • LUTEIN PO Take  by mouth.     •  alendronate (FOSAMAX) 70 MG Tab Take 70 mg by mouth every 7 days.     • sodium chloride (SALT) 1 GM Tab Take 1 tablet by mouth 3 times a day. 270 tablet 3   • Multiple Vitamins-Minerals (PRESERVISION AREDS PO) Take 1 tablet by mouth 2 times a day.     • Cholecalciferol (VITAMIN D3) 125 MCG (5000 UT) Tab Take 1 capsule by mouth every day.     • Cyanocobalamin (VITAMIN B-12) 2500 MCG SL Tab Place 1 tablet under the tongue every day.     • ascorbic acid (ASCORBIC ACID) 500 MG Tab Take 500 mg by mouth every day.     • CALCIUM PO Take 1 tablet by mouth every day.       No current facility-administered medications for this visit.          Current supplements as per medication list.     Allergies: Patient has no known allergies.    Current social contact/activities: With family and friends    She  reports that she has never smoked. She has never used smokeless tobacco. She reports current alcohol use of about 1.8 oz of alcohol per week. She reports that she does not use drugs.  Counseling given: Not Answered    ROS:    Gait: Uses no assistive device  Ostomy: No  Other tubes: No  Amputations: No  Chronic oxygen use: No  Wears hearing aids: No   : Denies any urinary leakage during the last 6 months    Screening:    Depression Screening  Little interest or pleasure in doing things?  0 - not at all  Feeling down, depressed , or hopeless? 0 - not at all  Patient Health Questionnaire Score: 0     Screening for Cognitive Impairment  Three Minute Recall (captain, garden, picture) 3/3    Miki clock face with all 12 numbers and set the hands to show 5 past 8.  Yes    Cognitive concerns identified deferred for follow up unless specifically addressed in assessment and plan.    Fall Risk Assessment  Has the patient had two or more falls in the last year or any fall with injury in the last year?  No    Safety Assessment  Throw rugs on floor.  Yes  Handrails on all stairs.  Yes  Good lighting in all hallways.  Yes  Difficulty hearing.   No  Patient counseled about all safety risks that were identified.    Functional Assessment ADLs  Are there any barriers preventing you from cooking for yourself or meeting nutritional needs?  No.    Are there any barriers preventing you from driving safely or obtaining transportation?  No.    Are there any barriers preventing you from using a telephone or calling for help?  No.    Are there any barriers preventing you from shopping?  No.    Are there any barriers preventing you from taking care of your own finances?  No.    Are there any barriers preventing you from managing your medications?  No.    Are there any barriers preventing you from showering, bathing or dressing yourself?  No.    Are you currently engaging in any exercise or physical activity?  Yes.  Daily walk,  biking  What is your perception of your health?  Good.    Health Maintenance Summary          Scheduled - MAMMOGRAM (Yearly) Scheduled for 12/13/2021 11/30/2020  MA-SCREENING MAMMO BILAT W/TOMOSYNTHESIS W/CAD    10/03/2019  MA-SCREENING MAMMO BILAT W/TOMOSYNTHESIS W/CAD    09/24/2018  Done    09/24/2018  MA-MAMMO SCREENING BILAT W/GLYNN W/CAD    09/21/2017  MA-MAMMO SCREENING BILAT W/GLYNN W/CAD    Only the first 5 history entries have been loaded, but more history exists.          IMM PNEUMOCOCCAL VACCINE: 65+ Years (2 of 2 - PPSV23) Next due on 12/15/2021    09/29/2017  Imm Admin: Pneumococcal Conjugate Vaccine (Prevnar/PCV-13)    12/15/2016  Imm Admin: Pneumococcal polysaccharide vaccine (PPSV-23)          Ordered - COLORECTAL CANCER SCREENING (COLONOSCOPY - Every 10 Years) Ordered on 11/8/2021    12/15/2013  AMB REFERRAL TO GI FOR COLONOSCOPY    05/10/2012  OCCULT BLOOD FECES IMMUNOASSAY          IMM DTaP/Tdap/Td Vaccine (2 - Td or Tdap) Next due on 7/31/2024 07/31/2014  Imm Admin: Tdap Vaccine    09/03/2008  Imm Admin: TD Vaccine          BONE DENSITY (Every 5 Years) Tentatively due on 2/11/2025 02/11/2020  DS-BONE DENSITY STUDY  (DEXA)          HEPATITIS C SCREENING  Completed    10/21/2017  HEP C VIRUS ANTIBODY          IMM ZOSTER VACCINES (Series Information) Completed    09/05/2019  Imm Admin: Zoster Vaccine Recombinant (RZV) (SHINGRIX)    06/25/2019  Imm Admin: Zoster Vaccine Recombinant (RZV) (SHINGRIX)    11/05/2012  Imm Admin: Zoster Vaccine Live (ZVL) (Zostavax) - HISTORICAL DATA          COVID-19 Vaccine (Series Information) Completed    10/03/2021  Imm Admin: Pfizer SARS-CoV-2 Vaccine    03/18/2021  Imm Admin: Pfizer SARS-CoV-2 Vaccine    02/25/2021  Imm Admin: Pfizer SARS-CoV-2 Vaccine          IMM INFLUENZA (Series Information) Completed    11/08/2021  Imm Admin: Influenza Vaccine Adult HD    11/03/2020  Imm Admin: Influenza Vaccine Adult HD    10/02/2019  Imm Admin: Influenza Vaccine Adult HD    10/16/2018  Imm Admin: Influenza Vaccine Adult HD    09/29/2017  Imm Admin: Influenza Vaccine Adult HD    Only the first 5 history entries have been loaded, but more history exists.          IMM HEP B VACCINE (Series Information) Aged Out    No completion history exists for this topic.          IMM MENINGOCOCCAL VACCINE (MCV4) (Series Information) Aged Out    No completion history exists for this topic.          Discontinued - PAP SMEAR  Discontinued    02/05/2013  Reason not specified              Patient Care Team:  Darvin Brennan M.D. as PCP - General (Family Medicine)  Dr. Regan Rico, OD as Attending Team Physician (Ophthalmology)  CAROL Knutson as Attending Team Physician (Orthopedic Surgery)    Social History     Tobacco Use   • Smoking status: Never Smoker   • Smokeless tobacco: Never Used   Vaping Use   • Vaping Use: Never used   Substance Use Topics   • Alcohol use: Yes     Alcohol/week: 1.8 oz     Types: 3 Glasses of wine per week     Comment: 3 a week   • Drug use: No     Family History   Problem Relation Age of Onset   • Hypertension Mother    • Heart Disease Father    • Cancer Brother         neck  "  • Cancer Maternal Grandmother    • No Known Problems Maternal Grandfather    • No Known Problems Paternal Grandmother    • No Known Problems Paternal Grandfather    • Alcohol/Drug Neg Hx    • Allergies Neg Hx    • Diabetes Neg Hx    • Psychiatric Illness Neg Hx    • Stroke Neg Hx    • Thyroid Neg Hx      She  has a past medical history of Hydronephrosis, Hypertension, Hyponatremia, Leukopenia, Liver cyst, Small bowel obstruction (HCC), and Varicose vein of leg.   Past Surgical History:   Procedure Laterality Date   • EXPLORATORY LAPAROTOMY  12/14/2016    Procedure: EXPLORATORY LAPAROTOMY, EXTENSIVE ADHESIOLYSIS, SMALL BOWEL ANASTOMOSIS;  Surgeon: Mac Reddy M.D.;  Location: SURGERY [facility];  Service:    • CYSTOSCOPY STENT PLACEMENT Right 5/13/2016    Procedure: CYSTOSCOPY STENT PLACEMENT;  Surgeon: Fabrizio Sun M.D.;  Location: SURGERY [facility];  Service:    • RETROGRADES  5/13/2016    Procedure: RETROGRADES;  Surgeon: Fabrizio Sun M.D.;  Location: SURGERY [facility];  Service:    • EXPLORATORY LAPAROTOMY  5/3/2016    Procedure: EXPLORATORY LAPAROTOMY- Bowel obstruction;  Surgeon: Mac Reddy M.D.;  Location: SURGERY [facility];  Service:    • COLON RESECTION LAPAROSCOPIC  3/27/2014    Performed by Mac Reddy M.D. at [facility]   • BREAST BIOPSY  2007   • LUMPECTOMY Left 2005    benign     Exam:   Blood Pressure 112/78 (BP Location: Left arm, Patient Position: Sitting, BP Cuff Size: Adult)   Pulse 70   Temperature 37 °C (98.6 °F) (Temporal)   Height 1.575 m (5' 2\")   Weight 50.3 kg (111 lb)   Oxygen Saturation 97%  Body mass index is 20.3 kg/m².  Hearing good.    Dentition fair  Alert, oriented in no acute distress.  Eye contact is good, speech goal directed, affect calm    Labs  Reviewed and discussed  Lab Results   Component Value Date/Time    CHOLSTRLTOT 174 10/21/2021 06:51 AM    LDL 90 10/21/2021 06:51 AM    HDL 70 10/21/2021 06:51 AM    TRIGLYCERIDE " 71 10/21/2021 06:51 AM       Lab Results   Component Value Date/Time    SODIUM 132 (L) 10/21/2021 06:51 AM    POTASSIUM 4.3 10/21/2021 06:51 AM    CHLORIDE 96 10/21/2021 06:51 AM    CO2 27 10/21/2021 06:51 AM    GLUCOSE 96 10/21/2021 06:51 AM    BUN 9 10/21/2021 06:51 AM    CREATININE 0.48 (L) 10/21/2021 06:51 AM    CREATININE 0.6 05/02/2007 09:18 AM     Lab Results   Component Value Date/Time    ALKPHOSPHAT 80 10/21/2021 06:51 AM    ASTSGOT 25 10/21/2021 06:51 AM    ALTSGPT 19 10/21/2021 06:51 AM    TBILIRUBIN 0.9 10/21/2021 06:51 AM      Lab Results   Component Value Date/Time    HBA1C 5.1 06/11/2021 02:58 PM    HBA1C 5.2 10/24/2020 09:37 AM     No results found for: TSH  No results found for: FREET4  Lab Results   Component Value Date/Time    WBC 3.5 (L) 10/21/2021 06:51 AM    RBC 4.15 (L) 10/21/2021 06:51 AM    HEMOGLOBIN 13.4 10/21/2021 06:51 AM    HEMATOCRIT 38.2 10/21/2021 06:51 AM    MCV 92.0 10/21/2021 06:51 AM    MCH 32.3 10/21/2021 06:51 AM    MCHC 35.1 (H) 10/21/2021 06:51 AM    MPV 9.4 10/21/2021 06:51 AM    NEUTSPOLYS 52.90 10/21/2021 06:51 AM    LYMPHOCYTES 34.60 10/21/2021 06:51 AM    MONOCYTES 9.10 10/21/2021 06:51 AM    EOSINOPHILS 2.00 10/21/2021 06:51 AM    BASOPHILS 1.10 10/21/2021 06:51 AM    ANISOCYTOSIS 1+ 05/05/2016 04:10 AM      Assessment and Plan    1. Medicare annual wellness visit, subsequent  Reviewed PMH, PSH, FH, SH, ALL, MEDS, HCM/IMM.   - Subsequent Annual Wellness Visit - Includes PPPS ()    2. Health care maintenance  Per HPI  - Subsequent Annual Wellness Visit - Includes PPPS ()    3. Need for vaccination  Information was provided to the patient regarding the vaccine, including side effects. Vaccine was given by my medical assistant under my supervision.  - Subsequent Annual Wellness Visit - Includes PPPS ()  - INFLUENZA VACCINE, HIGH DOSE (65+ ONLY)    4. Essential hypertension  - Controlled, continue with current management, vascular medicine follow-up.  She had  recent blood pressure monitoring.  - Subsequent Annual Wellness Visit - Includes PPPS ()    5. Hyponatremia  Controlled/improved with salt supplement, will continue  - Subsequent Annual Wellness Visit - Includes PPPS ()  - Referral to Nephrology    6. Hypercholesterolemia  Normalized with diet and exercise  - Subsequent Annual Wellness Visit - Includes PPPS ()    7. Neutropenia, unspecified type (HCC)  Borderline, follow-up labs  - Subsequent Annual Wellness Visit - Includes PPPS ()    8. Anemia, unspecified type  Follow-up labs  - Subsequent Annual Wellness Visit - Includes PPPS ()  - OCCULT BLOOD FECES IMMUNOASSAY; Future  - IRON/TOTAL IRON BIND; Future  - VIT B12,  FOLIC ACID    9. Benign liver cyst  Reviewed imaging  - Subsequent Annual Wellness Visit - Includes PPPS ()    10. Age-related osteoporosis without current pathological fracture  On alendronate  - Subsequent Annual Wellness Visit - Includes PPPS ()    11. Anxiety  She will start fluoxetine low dose  - Subsequent Annual Wellness Visit - Includes PPPS ()  - FLUoxetine (PROZAC) 10 MG Cap; Take 1 Capsule by mouth every day.  Dispense: 100 Capsule; Refill: 0    Services suggested: No services needed at this time  Health Care Screening: Age-appropriate preventive services recommended by USPTF and ACIP covered by Medicare were discussed today. Services ordered if indicated and agreed upon by the patient.  Referrals offered: Community-based lifestyle interventions to reduce health risks and promote self-management and wellness, fall prevention, nutrition, physical activity, tobacco-use cessation, weight loss, and mental health services as per orders if indicated.    Discussion today about general wellness and lifestyle habits:    · Prevent falls and reduce trip hazards; Cautioned about securing or removing rugs.  · Have a working fire alarm and carbon monoxide detector;   · Engage in regular physical activity and social  activities     Follow-up: Return in about 3 months (around 2/8/2022), or if symptoms worsen or fail to improve, for LABS.

## 2021-11-30 ENCOUNTER — OFFICE VISIT (OUTPATIENT)
Dept: VASCULAR LAB | Facility: MEDICAL CENTER | Age: 71
End: 2021-11-30
Attending: INTERNAL MEDICINE
Payer: MEDICARE

## 2021-11-30 VITALS
HEART RATE: 71 BPM | BODY MASS INDEX: 21.16 KG/M2 | HEIGHT: 62 IN | DIASTOLIC BLOOD PRESSURE: 79 MMHG | SYSTOLIC BLOOD PRESSURE: 143 MMHG | WEIGHT: 115 LBS

## 2021-11-30 DIAGNOSIS — I10 ESSENTIAL HYPERTENSION: ICD-10-CM

## 2021-11-30 DIAGNOSIS — E78.00 HYPERCHOLESTEROLEMIA: ICD-10-CM

## 2021-11-30 DIAGNOSIS — E87.1 HYPONATREMIA: ICD-10-CM

## 2021-11-30 PROCEDURE — 99212 OFFICE O/P EST SF 10 MIN: CPT

## 2021-11-30 PROCEDURE — 99214 OFFICE O/P EST MOD 30 MIN: CPT | Performed by: NURSE PRACTITIONER

## 2021-11-30 ASSESSMENT — ENCOUNTER SYMPTOMS
WHEEZING: 0
HEMOPTYSIS: 0
MYALGIAS: 0
SEIZURES: 0
SHORTNESS OF BREATH: 0
CLAUDICATION: 0
NAUSEA: 0
CHILLS: 0
FOCAL WEAKNESS: 0
ABDOMINAL PAIN: 0
BRUISES/BLEEDS EASILY: 0
PALPITATIONS: 0
DIARRHEA: 0
WEAKNESS: 0
DIZZINESS: 0
BLOOD IN STOOL: 0
FEVER: 0
TREMORS: 0
COUGH: 0
VOMITING: 0
HEADACHES: 0

## 2021-11-30 ASSESSMENT — FIBROSIS 4 INDEX: FIB4 SCORE: 1.07

## 2021-12-01 NOTE — PROGRESS NOTES
[facility] - Follow up EVALUATION   11/30/21    ASSESSEMENT AND PLAN:      1. BLOOD PRESSURE CONTROL   Qualifies as Resistant HTN (RH):  No, BP at goal with <3 meds   Office BP Goal ACC/AHA (2017) goal <130/80  Home BP at goal:  yes  Office BP at goal:  no  24h ABPM:  YES  Device candidate? no  Plan:   Monitoring:   - start/continue home BP monitoring, reviewed correct technique:  Yes   - order 24h ABPM: today   - monitor lytes/gfr routinely   - advised that stabilizing BP may require multiple med changes and close monitoring over the next several months, reviewed that initially there will be additional imaging and labs and the possibility of adverse drug rxn's and variability of his BP and HR until we have things stabilized.  Advised to contact our office as needed for questions or concerns.      2. WORK UP FOR SECONDARY CAUSES OF RH:  Obstructive Sleep Apnea: Excluded  Renal parenchymal disease: Excluded  Renovascular HTN: Not Yet Assessed  Primary Aldosteronism: Not Yet Assessed  Thyroid Function: Excluded  Pheochromocytoma: Not Yet Assessed   Cushing's:   could consider to eval due to low Na    Coarctation of Aorta: excluded  Other: none identified    Recommendations At This Visit: as noted in orders         3. MEDICATION USE / ADHERENCE:   Assessment: Complete  Recommendations: Instructed to Continue Taking All Medications As Prescribed         4. HTN-MEDIATED END-ORGAN DAMAGE:  Left Ventricular Hypertrophy: Absent on  ECG Date: 2021   Urine Albuminuria: None on Date: 2015  Renal Function: Chronic Kidney Disease  CKD G2 at worst   Ophthalmologic: Absent per patient report and/or eye specialist   Established Cardiovascular Disease: None    5. LIFESTYLE INTERVENTIONS:    SMOKING:   reports that she has never smoked. She has never used smokeless tobacco.   - continued complete avoidance of all tobacco products     PHYSICAL ACTIVITY: continue healthy activity to improve CV fitness.  In general, targeting  >150min/week of moderate-level activity.  Additional details reviewed with patient and/or outlined in care instructions     WEIGHT MANAGEMENT AND NUTRITION: Dietary plan was discussed with patient at this visit including Mediterrean and/or DASH-dietary approaches, substitution of MUFA/PUFA for saturated fats, substituting whole grains for simple carbs, substituting lean meats for fatty meats, increase use of plant-based protein vs animal-based, lowered sodium.  Additional details reviewed with patient and/or outlined in care instructions   - recommended <2,000mg/day but concerned about ongoing use of NaCl tabs that are >3g/day - reviewed that this is unfortunately counter-productive to BP mgmt standards of care- she spoke with her PCP who recommended continue for now.    6. STANDARD HTN PHARMACOTHERAPY:  ACEI/ARB: continue losartan 100mg QHS  Thiazide Type Diuretic: avoid due to chronic hypoNa; previously on HCTZ when Na dropped to 124  DHP-CCB: continue amlodipine 2.5mg QHS     7. ADDITIONAL AGENTS   Aldosterone Receptor Antagonist: avoid   Loop Diuretic: not indicated   Peripheral Alpha Blocker: not indicated   BB: not indicated   Non-DHP-CCB: not indicated   Direct Vasodilator: not indicated   Centrally Acting Alpha Agonist: not indicated   Potassium-sparing diuretic: not indicated   DRI: not indicated     LIPID MANAGEMENT:   Qualifies for Statin Therapy Based on 2018 ACC/AHA Guidelines: no  The 10-year ASCVD risk score (Denison DC Jr., et al., 2013) is: 12.6%  Major ASCVD events: None  High-risk conditions: None   Risk-enhancers: N/A  Currently on Statin: No  Treatment goals: LDL-C <100 (consider non-HDL-C <130, apoB <90)  At goal? N/A  Plan:   - reinforced ongoing TLC measures as noted   - defer lab surveillance to PCP   Meds: none at this time     GLYCEMIA MANAGEMENT:  Normal    ANTIPLATELET/ANTICOAGULANT THERAPY:   - not indicated     OTHER ISSUES:   # hyponatremia, moderate, no symptoms  - ongoing care with  PCP, defer mgmt with PCP and consider eval with nephrology for further evaluation  - use of NaCl tab is counter-productive to BP mgmt- continue work up with PCP and stop when ok.     # hypocalcemia - no symptoms  - defer w/u to PCP and/or nephrology if needed     Studies Ordered At This Visit:    Blood Work to Be Obtained Prior to Next Visit: as noted below   Disposition: 6 months     1. Essential hypertension  Basic Metabolic Panel   2. Hyponatremia     3. Hypercholesterolemia         History of Present Illness:   Alysa Sarabia is a female seen for evaluation of resistant HTN and management.   Alysa Sarabia is initially referred by Darvin Brennan    HTN:  Current/interval HTN concerns:  Denies   Current ADRs: No  HTN sx:  No current blurred or changed vision, chest pain, shortness of breath, headache, nausea, dizziness/vertigo   Home BP log: not checking   24h ABPM completed: 113/76, consistent with well controlled out of office BP control   Adherence to current HTN meds: compliant all of the time  Hx of clinical ASCVD events: None   Lifestyle factors:  Weight Change: stable   BMI Readings from Last 5 Encounters:   11/30/21 21.03 kg/m²   11/08/21 20.30 kg/m²   10/21/21 20.85 kg/m²   08/17/21 20.12 kg/m²   06/16/21 20.85 kg/m²     Diet pattern: common adult, low sodium  Daily salt intake estimate:  High   Due to NaCl tab   EtOH: No  Exercise habits: minimal exercise  Perceived barriers to care: none   Pertinent HTN hx (reviewed at initial visit):   Age at HTN dx: >10yrs   (if female, any hx of pregnancy-related HTN): n/a  Past HTN medications: as noted   HTN crises:  No prior hospitalization or crises   Interfering substances/contributing factors:  NaCl tabs      Problem List:     Patient Active Problem List    Diagnosis Date Noted   • Age-related osteoporosis without current pathological fracture 10/26/2020   • Neutropenia (HCC) 11/04/2019   • Hyponatremia 11/04/2019   • Health care  maintenance 04/25/2019   • Hypercholesterolemia 04/25/2019   • Benign liver cyst 07/24/2017   • Essential hypertension 09/03/2015      Surgical History:       Current Outpatient Medications:   •  FLUoxetine, 10 mg, Oral, DAILY, Taking  •  losartan, 100 mg, Oral, DAILY, Taking  •  amLODIPine, 2.5 mg, Oral, QHS, Taking  •  LUTEIN PO, Take  by mouth., Taking  •  alendronate, 70 mg, Oral, Q7 DAYS, Taking  •  sodium chloride, 1 g, Oral, TID, Taking  •  Multiple Vitamins-Minerals (PRESERVISION AREDS PO), 1 Tablet, Oral, BID, Taking  •  Vitamin D3, 1 Capsule, Oral, DAILY, Taking  •  Vitamin B-12, 1 Tablet, Sublingual, DAILY, Taking  •  ascorbic acid, 500 mg, Oral, DAILY, Taking  •  CALCIUM PO, 1 Tablet, Oral, DAILY, Taking   Patient has no known allergies.          Social History:     Social History     Tobacco Use   • Smoking status: Never Smoker   • Smokeless tobacco: Never Used   Vaping Use   • Vaping Use: Never used   Substance Use Topics   • Alcohol use: Yes     Alcohol/week: 1.8 oz     Types: 3 Glasses of wine per week     Comment: 3 a week   • Drug use: No       Review of Systems:   Review of Systems   Constitutional: Negative for chills, fever and malaise/fatigue.   HENT: Negative for nosebleeds.    Respiratory: Negative for cough, hemoptysis, shortness of breath and wheezing.    Cardiovascular: Negative for chest pain, palpitations, claudication and leg swelling.   Gastrointestinal: Negative for abdominal pain, blood in stool, diarrhea, nausea and vomiting.   Musculoskeletal: Negative for joint pain and myalgias.   Skin: Negative for itching and rash.   Neurological: Negative for dizziness, tremors, focal weakness, seizures, weakness and headaches.   Endo/Heme/Allergies: Does not bruise/bleed easily.      Objective:     Vitals:    11/30/21 1609 11/30/21 1613   BP: 144/78 143/79   BP Location: Left arm Left arm   Patient Position: Sitting Sitting   BP Cuff Size: Small adult Small adult   Pulse: 71 71   Weight: 52.2  "kg (115 lb)    Height: 1.575 m (5' 2\")      Body mass index is 21.03 kg/m².  BP Readings from Last 5 Encounters:   11/30/21 143/79   11/08/21 112/78   10/21/21 132/78   09/27/21 120/80   08/17/21 136/88      Physical Exam  Vitals reviewed.   Constitutional:       General: She is not in acute distress.     Appearance: Normal appearance.   HENT:      Head: Normocephalic and atraumatic.   Eyes:      General: Lids are normal.      Extraocular Movements: Extraocular movements intact.      Conjunctiva/sclera: Conjunctivae normal.   Neck:      Thyroid: No thyroid mass.      Vascular: Normal carotid pulses. No carotid bruit.      Trachea: Trachea normal.   Cardiovascular:      Rate and Rhythm: Normal rate and regular rhythm.      Chest Wall: PMI is not displaced.      Pulses: Normal pulses.           Carotid pulses are 2+ on the right side and 2+ on the left side.       Radial pulses are 2+ on the right side and 2+ on the left side.        Dorsalis pedis pulses are 2+ on the right side and 2+ on the left side.        Posterior tibial pulses are 2+ on the right side and 2+ on the left side.      Heart sounds: Normal heart sounds.      Comments: Stemmer's sign - negative bilat   Spider telangectasia:       RLE:  None      LLE: none   Varicosities:           RLE: none      LLE: none   Corona phlebectatica:      RLE:  None        LLE:  None   Cording:         RLE:  None     LLE: None         Pulmonary:      Effort: Pulmonary effort is normal.      Breath sounds: Normal breath sounds.   Musculoskeletal:      Cervical back: Full passive range of motion without pain and neck supple.      Right lower leg: No edema.      Left lower leg: No edema.   Skin:     General: Skin is warm and dry.      Capillary Refill: Capillary refill takes less than 2 seconds.      Coloration: Skin is not cyanotic.      Nails: There is no clubbing.   Neurological:      General: No focal deficit present.      Mental Status: She is alert and oriented to " person, place, and time. Mental status is at baseline.      Cranial Nerves: Cranial nerves are intact.      Coordination: Coordination is intact.      Gait: Gait is intact.   Psychiatric:         Mood and Affect: Mood normal.         Behavior: Behavior normal.        Accessory Clinical Findings:     Lab Results   Component Value Date    CHOLSTRLTOT 174 10/21/2021    LDL 90 10/21/2021    HDL 70 10/21/2021    TRIGLYCERIDE 71 10/21/2021      Lab Results   Component Value Date    PROTHROMBTM 13.4 2021    INR 1.05 2021       Lab Results   Component Value Date    HBA1C 5.1 2021      Lab Results   Component Value Date    SODIUM 132 (L) 10/21/2021    POTASSIUM 4.3 10/21/2021    CHLORIDE 96 10/21/2021    CO2 27 10/21/2021    GLUCOSE 96 10/21/2021    BUN 9 10/21/2021    CREATININE 0.48 (L) 10/21/2021       Ref. Range 10/21/2021 06:51   Osmolality Serum Latest Ref Range: 278 - 298 mOsm/kg H2O 274 (L)     VASCULAR IMAGING:     Last EKG:   Results for orders placed or performed during the hospital encounter of 21   EKG (NOW)   Result Value Ref Range    Report       [facility] Emergency Dept.    Test Date:  2021  Pt Name:    AQUILINO SALAZAR            Department: ER  MRN:        [id number]                      Room:  Gender:     Female                       Technician: [id number]  :        1950                   Requested By:ER TRIAGE PROTOCOL  Order #:    [id number]                    Reading MD: ALEJANDRINA ALLEN MD    Measurements  Intervals                                Axis  Rate:       76                           P:          64  IA:         168                          QRS:        6  QRSD:       104                          T:          56  QT:         412  QTc:        464    Interpretive Statements  SINUS RHYTHM  LOW VOLTAGE IN FRONTAL LEADS  Compared to ECG 2016 20:33:41  Right-axis deviation no longer present  T-wave abnormality no longer  present  Electronically Signed On 6- 17:22:43 PDT by MD Melissa BOWER A.P.N.  [facility]   [facility]   [phone]

## 2021-12-13 ENCOUNTER — HOSPITAL ENCOUNTER (OUTPATIENT)
Dept: RADIOLOGY | Facility: MEDICAL CENTER | Age: 71
End: 2021-12-13
Attending: INTERNAL MEDICINE
Payer: MEDICARE

## 2021-12-13 DIAGNOSIS — Z12.31 VISIT FOR SCREENING MAMMOGRAM: ICD-10-CM

## 2021-12-13 PROCEDURE — 77063 BREAST TOMOSYNTHESIS BI: CPT

## 2022-01-01 NOTE — ED PROVIDER NOTES
ED Provider Note    CHIEF COMPLAINT  Chief Complaint   Patient presents with   • Shoulder Injury     BIB [facility] pt c/o left shoulder pain after trip and fall over backpack. Left shoulder deformity. Pt medicated PTA with 200mcg Fentanyl by jossie.        HPI  Alysa Sarabia is a 67 y.o. female who presents with severe left shoulder pain, the patient fell at work, she tripped over her backpack and has an obvious deformity of the left shoulder. She denies hitting her head, she denies loss of conscious. The shoulder has severe pain when she tries to move it. She denies left elbow pain, left wrist pain, she denies any numbness or weakness of the extremity.    REVIEW OF SYSTEMS  See HPI for further details. All other systems are negative.     PAST MEDICAL HISTORY   has a past medical history of Hydronephrosis; Hypertension; Hyponatremia; Leukopenia; Liver cyst; Small bowel obstruction; and Varicose vein of leg.    SOCIAL HISTORY  Social History     Social History Main Topics   • Smoking status: Never Smoker   • Smokeless tobacco: Never Used   • Alcohol use Not on file      Comment: 4 per week   • Drug use: Unknown   • Sexual activity: Not on file       SURGICAL HISTORY   has a past surgical history that includes lumpectomy; breast biopsy (2007); colon resection laparoscopic (3/27/2014); exploratory laparotomy (5/3/2016); cystoscopy stent placement (Right, 5/13/2016); retrogrades (5/13/2016); and exploratory laparotomy (12/14/2016).    CURRENT MEDICATIONS  Home Medications     Reviewed by Suze Rogers (Pharmacy Tech) on 02/28/18 at 1510  Med List Status: Complete   Medication Last Dose Status   Cyanocobalamin (VITAMIN B-12 PO) 2/28/2018 Active   LUTEIN PO 2/28/2018 Active   multivitamin (THERAGRAN) Tab 2/28/2018 Active   valsartan (DIOVAN) 320 MG tablet 2/28/2018 Active                  ALLERGIES  Allergies   Allergen Reactions   • Diltiazem Rash     Skin rash       PHYSICAL EXAM  VITAL SIGNS: /88   " Pulse 79   Temp 37.1 °C (98.7 °F)   Resp (!) 21   Ht 1.575 m (5' 2\")   Wt 50 kg (110 lb 1.9 oz)   SpO2 98%   Breastfeeding? No   BMI 20.14 kg/m²  @NAHEED[[id number]::@   Pulse ox interpretation: I interpret this pulse ox as normal.  Constitutional: Alert in no apparent distress.  HENT: No signs of trauma, Bilateral external ears normal, Nose normal.   Eyes: Pupils are equal and reactive, Conjunctiva normal, Non-icteric.   Neck: Normal range of motion, No tenderness, Supple, No stridor.   Lymphatic: No lymphadenopathy noted.   Cardiovascular: Regular rate and rhythm, no murmurs.   Thorax & Lungs: Normal breath sounds, No respiratory distress, No wheezing, No chest tenderness.   Abdomen: Bowel sounds normal, Soft, No tenderness, No masses, No pulsatile masses. No peritoneal signs.  Skin: Warm, Dry, No erythema, No rash.   Back: No bony tenderness, No CVA tenderness.   Extremities: Intact distal pulses, No edema, Tenderness of left shoulder, No cyanosis.  Musculoskeletal: Obvious deformity of the left shoulder.  Neurologic: Alert , Normal motor function, Normal sensory function, No focal deficits noted.   Psychiatric: Affect normal, Judgment normal, Mood normal.       DIAGNOSTIC STUDIES / PROCEDURES        RADIOLOGY  DX-SHOULDER 2+ LEFT   Final Result      Satisfactory relocation of the left humeral head following anterior dislocation.      DX-SHOULDER 2+ LEFT   Final Result      Anterior dislocation with Hill-Sachs fracture      Borderline AC joint widening. Recommend clinical correlation to evaluate for acute separation        Conscious Sedation Procedure Note    Indication: shoulder dislocation    Consent: I have discussed with the patient and/or the patient representative the indication, alternatives, and the possible risks and/or complications of the planned procedure and the anesthesia methods. The patient and/or patient representative appear to understand and agree to proceed.    Physician Involvement: The " attending physician was present and supervising this procedure.    Pre-Sedation Documentation and Exam: I have personally completed a history, physical exam & review of systems for this patient (see notes).  Airway Assessment: normal, Mallampati Class I - (soft palate, fauces, uvula & anterior/posterior tonsillar pillars are visible)    Prior History of Anesthesia Complications: none    ASA Classification: Class 1 - A normal healthy patient    Sedation/ Anesthesia Plan: intravenous sedation    Medications Used: propofol intravenously    Monitoring and Safety: The patient was placed on a cardiac monitor and vital signs, pulse oximetry and level of consciousness were continuously evaluated throughout the procedure. The patient was closely monitored until recovery from the medications was complete and the patient had returned to baseline status. Respiratory therapy was on standby at all times during the procedure.    (The following sections must be completed)  Post-Sedation Vital Signs: Vital signs were reviewed and were stable after the procedure (see flow sheet for vitals)            Post-Sedation Exam: Lungs: clear and Cardiovascular: regular rate and rhythm           Complications: none  Total time of conscious sedation 16 minutes    Traction countertraction was used to relocate the left shoulder. There were no complications.      COURSE & MEDICAL DECISION MAKING  Pertinent Labs & Imaging studies reviewed. (See chart for details)    Differential diagnosis: Left shoulder fracture, left shoulder dislocation    The patient was given 30 milgrams IV Toradol, 4 milgrams IV morphine, and 4 mg IV Zofran.     The patient will return for new or worsening symptoms and is stable at the time of discharge.    The patient is insisting that she will not go to [facility], rather she will go to [facility]. I have given her phone numbers for both. She will follow up there tomorrow to determine the disability.    The  patient is referred to her primary physician for blood pressure management, diabetic screening, and for all other preventative health concerns.        DISPOSITION:  Patient will be discharged home in stable condition.    FOLLOW UP:  [facility], Emergency Dept  [address]  [place] [id number] [phone]    If symptoms worsen    Darvin Brennan M.D.  [address]  [address]  [place] [address] [phone]      As needed    [facility]  [address] [address] [phone]      call for follow up if symptoms do not resolve    [facility]   [address] [place] [address] [phone]      follow up tomorrow    [facility]  [address] [facility] [id number] [phone]      follow up tomorrow      OUTPATIENT MEDICATIONS:  New Prescriptions    No medications on file           The patient will not drink alcohol nor drive with prescribed medications. The patient will return for worsening symptoms and is stable at the time of discharge. The patient verbalizes understanding and will comply.    FINAL IMPRESSION  1. Acute left shoulder dislocation  2. Close reduction of left shoulder dislocation by ER physician  3. Conscious sedation 16 minutes by ERP.         Electronically signed by: Rj Darnell, 2/28/2018 2:58 PM     negative

## 2022-01-03 ENCOUNTER — NON-PROVIDER VISIT (OUTPATIENT)
Dept: MEDICAL GROUP | Age: 72
End: 2022-01-03
Payer: MEDICARE

## 2022-01-03 ENCOUNTER — HOSPITAL ENCOUNTER (OUTPATIENT)
Facility: MEDICAL CENTER | Age: 72
End: 2022-01-03
Attending: INTERNAL MEDICINE
Payer: MEDICARE

## 2022-01-03 PROCEDURE — U0003 INFECTIOUS AGENT DETECTION BY NUCLEIC ACID (DNA OR RNA); SEVERE ACUTE RESPIRATORY SYNDROME CORONAVIRUS 2 (SARS-COV-2) (CORONAVIRUS DISEASE [COVID-19]), AMPLIFIED PROBE TECHNIQUE, MAKING USE OF HIGH THROUGHPUT TECHNOLOGIES AS DESCRIBED BY CMS-2020-01-R: HCPCS

## 2022-01-03 PROCEDURE — U0005 INFEC AGEN DETEC AMPLI PROBE: HCPCS

## 2022-01-03 NOTE — PROGRESS NOTES
Alysa Sarabia is a 71 y.o. female here for a non-provider visit for COVID testing.    Clinic collect COVID order in system?: Yes    Patient tolerated specimen collection and no adverse effects were observed or reported: Yes

## 2022-01-04 DIAGNOSIS — Z20.822 EXPOSURE TO COVID-19 VIRUS: ICD-10-CM

## 2022-01-05 LAB
COVID ORDER STATUS COVID19: NORMAL
SARS-COV-2 RNA RESP QL NAA+PROBE: NOTDETECTED
SPECIMEN SOURCE: NORMAL

## 2022-01-11 ENCOUNTER — TELEPHONE (OUTPATIENT)
Dept: NEPHROLOGY | Facility: MEDICAL CENTER | Age: 72
End: 2022-01-11

## 2022-01-11 NOTE — TELEPHONE ENCOUNTER
Iban Doe,   Patient is scheduled for new patient visit with you next Tuesday the 18th.   Would you like to order labs for her to complete before appointment?     Please review,     Thank you !

## 2022-01-12 DIAGNOSIS — E87.1 HYPONATREMIA: ICD-10-CM

## 2022-01-12 DIAGNOSIS — I10 ESSENTIAL HYPERTENSION: ICD-10-CM

## 2022-01-14 ENCOUNTER — HOSPITAL ENCOUNTER (OUTPATIENT)
Dept: LAB | Facility: MEDICAL CENTER | Age: 72
End: 2022-01-14
Attending: INTERNAL MEDICINE
Payer: MEDICARE

## 2022-01-14 DIAGNOSIS — D70.9 NEUTROPENIA, UNSPECIFIED TYPE (HCC): ICD-10-CM

## 2022-01-14 DIAGNOSIS — I10 ESSENTIAL HYPERTENSION: ICD-10-CM

## 2022-01-14 DIAGNOSIS — E87.1 HYPONATREMIA: ICD-10-CM

## 2022-01-14 DIAGNOSIS — D64.9 ANEMIA, UNSPECIFIED TYPE: ICD-10-CM

## 2022-01-14 DIAGNOSIS — E78.00 HYPERCHOLESTEROLEMIA: ICD-10-CM

## 2022-01-14 LAB
ALBUMIN SERPL BCP-MCNC: 4.2 G/DL (ref 3.2–4.9)
ALBUMIN SERPL BCP-MCNC: 4.2 G/DL (ref 3.2–4.9)
ALBUMIN/GLOB SERPL: 1.4 G/DL
ALP SERPL-CCNC: 73 U/L (ref 30–99)
ALT SERPL-CCNC: 16 U/L (ref 2–50)
ANION GAP SERPL CALC-SCNC: 7 MMOL/L (ref 7–16)
ANION GAP SERPL CALC-SCNC: 9 MMOL/L (ref 7–16)
APPEARANCE UR: CLEAR
AST SERPL-CCNC: 21 U/L (ref 12–45)
BASOPHILS # BLD AUTO: 1.3 % (ref 0–1.8)
BASOPHILS # BLD: 0.05 K/UL (ref 0–0.12)
BILIRUB SERPL-MCNC: 1 MG/DL (ref 0.1–1.5)
BILIRUB UR QL STRIP.AUTO: NEGATIVE
BUN SERPL-MCNC: 11 MG/DL (ref 8–22)
BUN SERPL-MCNC: 12 MG/DL (ref 8–22)
CALCIUM SERPL-MCNC: 8.8 MG/DL (ref 8.5–10.5)
CALCIUM SERPL-MCNC: 9.1 MG/DL (ref 8.5–10.5)
CHLORIDE SERPL-SCNC: 99 MMOL/L (ref 96–112)
CHLORIDE SERPL-SCNC: 99 MMOL/L (ref 96–112)
CHOLEST SERPL-MCNC: 192 MG/DL (ref 100–199)
CO2 SERPL-SCNC: 24 MMOL/L (ref 20–33)
CO2 SERPL-SCNC: 26 MMOL/L (ref 20–33)
COLOR UR: YELLOW
CREAT SERPL-MCNC: 0.4 MG/DL (ref 0.5–1.4)
CREAT SERPL-MCNC: 0.51 MG/DL (ref 0.5–1.4)
CREAT UR-MCNC: 45.6 MG/DL
CREAT UR-MCNC: 45.68 MG/DL
EOSINOPHIL # BLD AUTO: 0.11 K/UL (ref 0–0.51)
EOSINOPHIL NFR BLD: 2.9 % (ref 0–6.9)
ERYTHROCYTE [DISTWIDTH] IN BLOOD BY AUTOMATED COUNT: 43.7 FL (ref 35.9–50)
FOLATE SERPL-MCNC: 17.4 NG/ML
GLOBULIN SER CALC-MCNC: 3 G/DL (ref 1.9–3.5)
GLUCOSE SERPL-MCNC: 90 MG/DL (ref 65–99)
GLUCOSE SERPL-MCNC: 90 MG/DL (ref 65–99)
GLUCOSE UR STRIP.AUTO-MCNC: NEGATIVE MG/DL
HCT VFR BLD AUTO: 39.2 % (ref 37–47)
HDLC SERPL-MCNC: 72 MG/DL
HGB BLD-MCNC: 13.6 G/DL (ref 12–16)
IMM GRANULOCYTES # BLD AUTO: 0.01 K/UL (ref 0–0.11)
IMM GRANULOCYTES NFR BLD AUTO: 0.3 % (ref 0–0.9)
IRON SATN MFR SERPL: 36 % (ref 15–55)
IRON SERPL-MCNC: 113 UG/DL (ref 40–170)
KETONES UR STRIP.AUTO-MCNC: NEGATIVE MG/DL
LDLC SERPL CALC-MCNC: 104 MG/DL
LEUKOCYTE ESTERASE UR QL STRIP.AUTO: NEGATIVE
LYMPHOCYTES # BLD AUTO: 1.07 K/UL (ref 1–4.8)
LYMPHOCYTES NFR BLD: 27.9 % (ref 22–41)
MCH RBC QN AUTO: 32.7 PG (ref 27–33)
MCHC RBC AUTO-ENTMCNC: 34.7 G/DL (ref 33.6–35)
MCV RBC AUTO: 94.2 FL (ref 81.4–97.8)
MICRO URNS: NORMAL
MICROALBUMIN UR-MCNC: 1.7 MG/DL
MICROALBUMIN/CREAT UR: 37 MG/G (ref 0–30)
MONOCYTES # BLD AUTO: 0.31 K/UL (ref 0–0.85)
MONOCYTES NFR BLD AUTO: 8.1 % (ref 0–13.4)
NEUTROPHILS # BLD AUTO: 2.28 K/UL (ref 2–7.15)
NEUTROPHILS NFR BLD: 59.5 % (ref 44–72)
NITRITE UR QL STRIP.AUTO: NEGATIVE
NRBC # BLD AUTO: 0 K/UL
NRBC BLD-RTO: 0 /100 WBC
OSMOLALITY UR: 429 MOSM/KG H2O (ref 300–900)
PH UR STRIP.AUTO: 7.5 [PH] (ref 5–8)
PLATELET # BLD AUTO: 398 K/UL (ref 164–446)
PMV BLD AUTO: 9.4 FL (ref 9–12.9)
POTASSIUM SERPL-SCNC: 4.2 MMOL/L (ref 3.6–5.5)
POTASSIUM SERPL-SCNC: 4.2 MMOL/L (ref 3.6–5.5)
PROT SERPL-MCNC: 7.2 G/DL (ref 6–8.2)
PROT UR QL STRIP: NEGATIVE MG/DL
PROT UR-MCNC: 8 MG/DL (ref 0–15)
PROT/CREAT UR: 175 MG/G (ref 10–107)
RBC # BLD AUTO: 4.16 M/UL (ref 4.2–5.4)
RBC UR QL AUTO: NEGATIVE
SODIUM SERPL-SCNC: 132 MMOL/L (ref 135–145)
SODIUM SERPL-SCNC: 132 MMOL/L (ref 135–145)
SP GR UR STRIP.AUTO: 1.01
TIBC SERPL-MCNC: 317 UG/DL (ref 250–450)
TRIGL SERPL-MCNC: 80 MG/DL (ref 0–149)
UIBC SERPL-MCNC: 204 UG/DL (ref 110–370)
UROBILINOGEN UR STRIP.AUTO-MCNC: 0.2 MG/DL
VIT B12 SERPL-MCNC: 1544 PG/ML (ref 211–911)
WBC # BLD AUTO: 3.8 K/UL (ref 4.8–10.8)

## 2022-01-14 PROCEDURE — 82607 VITAMIN B-12: CPT

## 2022-01-14 PROCEDURE — 82043 UR ALBUMIN QUANTITATIVE: CPT

## 2022-01-14 PROCEDURE — 80061 LIPID PANEL: CPT

## 2022-01-14 PROCEDURE — 80053 COMPREHEN METABOLIC PANEL: CPT

## 2022-01-14 PROCEDURE — 85025 COMPLETE CBC W/AUTO DIFF WBC: CPT

## 2022-01-14 PROCEDURE — 82570 ASSAY OF URINE CREATININE: CPT

## 2022-01-14 PROCEDURE — 82040 ASSAY OF SERUM ALBUMIN: CPT | Mod: XU

## 2022-01-14 PROCEDURE — 83540 ASSAY OF IRON: CPT

## 2022-01-14 PROCEDURE — 83930 ASSAY OF BLOOD OSMOLALITY: CPT

## 2022-01-14 PROCEDURE — 84156 ASSAY OF PROTEIN URINE: CPT

## 2022-01-14 PROCEDURE — 80048 BASIC METABOLIC PNL TOTAL CA: CPT

## 2022-01-14 PROCEDURE — 83550 IRON BINDING TEST: CPT

## 2022-01-14 PROCEDURE — 81003 URINALYSIS AUTO W/O SCOPE: CPT

## 2022-01-14 PROCEDURE — 82746 ASSAY OF FOLIC ACID SERUM: CPT

## 2022-01-14 PROCEDURE — 36415 COLL VENOUS BLD VENIPUNCTURE: CPT

## 2022-01-14 PROCEDURE — 83935 ASSAY OF URINE OSMOLALITY: CPT

## 2022-01-17 LAB — OSMOLALITY SERPL: 281 MOSM/KG H2O (ref 278–298)

## 2022-01-18 ENCOUNTER — OFFICE VISIT (OUTPATIENT)
Dept: NEPHROLOGY | Facility: MEDICAL CENTER | Age: 72
End: 2022-01-18
Payer: MEDICARE

## 2022-01-18 ENCOUNTER — HOSPITAL ENCOUNTER (OUTPATIENT)
Facility: MEDICAL CENTER | Age: 72
End: 2022-01-18
Attending: INTERNAL MEDICINE
Payer: MEDICARE

## 2022-01-18 ENCOUNTER — APPOINTMENT (OUTPATIENT)
Dept: NEPHROLOGY | Facility: MEDICAL CENTER | Age: 72
End: 2022-01-18
Payer: MEDICARE

## 2022-01-18 VITALS
OXYGEN SATURATION: 99 % | DIASTOLIC BLOOD PRESSURE: 82 MMHG | SYSTOLIC BLOOD PRESSURE: 134 MMHG | HEIGHT: 62 IN | BODY MASS INDEX: 21.31 KG/M2 | HEART RATE: 67 BPM | WEIGHT: 115.8 LBS | TEMPERATURE: 97.6 F

## 2022-01-18 DIAGNOSIS — E87.1 HYPONATREMIA: ICD-10-CM

## 2022-01-18 DIAGNOSIS — I10 ESSENTIAL HYPERTENSION: ICD-10-CM

## 2022-01-18 PROCEDURE — 99204 OFFICE O/P NEW MOD 45 MIN: CPT | Performed by: INTERNAL MEDICINE

## 2022-01-18 PROCEDURE — 82274 ASSAY TEST FOR BLOOD FECAL: CPT

## 2022-01-18 RX ORDER — CLOTRIMAZOLE AND BETAMETHASONE DIPROPIONATE 10; .64 MG/G; MG/G
1 CREAM TOPICAL
COMMUNITY
Start: 2022-01-06

## 2022-01-18 ASSESSMENT — ENCOUNTER SYMPTOMS
ABDOMINAL PAIN: 0
SHORTNESS OF BREATH: 0
FEVER: 0

## 2022-01-18 ASSESSMENT — FIBROSIS 4 INDEX: FIB4 SCORE: 0.91

## 2022-01-18 NOTE — PROGRESS NOTES
Chief Complaint   Patient presents with   • New Patient     Hyponatremia       Requesting Provider: Darvin Brennan M.D.     HPI:  Alysa Sarabia is a 71 y.o. female with HTN, hyponatremia who presents today to establish nephrology care.     Re: HTN, diagnosed 1990's. BP control over the years has been decently controlled. She checks BP at home sometimes, usually 120-130's / 80's.     Re: Hyponatremia, noted chronically going back to 2007.  The patient was previously on hydrochlorothiazide, but this appears to have been stopped in the summer 2021.  Since then, sodium levels have improved from the 120 range, to the low 130 range. She denies taking prozac ever. She was prescribed salt tablets and has been taking them for less than a year. She denies excessive thirst. She thinks she drinks 6-7 cups of liquid a day. She denies pain, headaches, nausea, breathing troubles. She denies history of kidney stones. She thinks she eats normal amounts of food.       Past Medical History:   Diagnosis Date   • Hydronephrosis    • Hypertension    • Hyponatremia    • Leukopenia    • Liver cyst     benign, no f/u   • Small bowel obstruction (HCC)     R   • Varicose vein of leg        Past Surgical History:   Procedure Laterality Date   • EXPLORATORY LAPAROTOMY  12/14/2016    Procedure: EXPLORATORY LAPAROTOMY, EXTENSIVE ADHESIOLYSIS, SMALL BOWEL ANASTOMOSIS;  Surgeon: Mac Reddy M.D.;  Location: [facility];  Service:    • CYSTOSCOPY STENT PLACEMENT Right 5/13/2016    Procedure: CYSTOSCOPY STENT PLACEMENT;  Surgeon: Fabrizio Sun M.D.;  Location: [facility];  Service:    • RETROGRADES  5/13/2016    Procedure: RETROGRADES;  Surgeon: Fabrizio Sun M.D.;  Location: [facility];  Service:    • EXPLORATORY LAPAROTOMY  5/3/2016    Procedure: EXPLORATORY LAPAROTOMY- Bowel obstruction;  Surgeon: Mac Reddy M.D.;  Location: [facility];  Service:    • COLON RESECTION  LAPAROSCOPIC  3/27/2014    Performed by Mac Reddy M.D. at SUR[facility]   • BREAST BIOPSY  2007   • LUMPECTOMY Left 2005    benign        Outpatient Encounter Medications as of 1/18/2022   Medication Sig Dispense Refill   • losartan (COZAAR) 100 MG Tab Take 1 Tablet by mouth every day. 90 Tablet 3   • amLODIPine (NORVASC) 2.5 MG Tab Take 1 Tablet by mouth at bedtime for 360 days. 90 Tablet 3   • LUTEIN PO Take  by mouth.     • alendronate (FOSAMAX) 70 MG Tab Take 70 mg by mouth every 7 days.     • sodium chloride (SALT) 1 GM Tab Take 1 tablet by mouth 3 times a day. 270 tablet 3   • Multiple Vitamins-Minerals (PRESERVISION AREDS PO) Take 1 tablet by mouth 2 times a day.     • Cholecalciferol (VITAMIN D3) 125 MCG (5000 UT) Tab Take 1 capsule by mouth every day.     • Cyanocobalamin (VITAMIN B-12) 2500 MCG SL Tab Place 1 tablet under the tongue every day.     • ascorbic acid (ASCORBIC ACID) 500 MG Tab Take 500 mg by mouth every day.     • CALCIUM PO Take 1 tablet by mouth every day.     • clotrimazole-betamethasone (LOTRISONE) 1-0.05 % Cream Apply 1 g topically.     • [DISCONTINUED] FLUoxetine (PROZAC) 10 MG Cap Take 1 Capsule by mouth every day. (Patient not taking: Reported on 1/18/2022) 100 Capsule 0     No facility-administered encounter medications on file as of 1/18/2022.        Allergies   Allergen Reactions   • Hydrochlorothiazide      Hyponatremia       Social History     Socioeconomic History   • Marital status:      Spouse name: Not on file   • Number of children: Not on file   • Years of education: Not on file   • Highest education level: Not on file   Occupational History   • Not on file   Tobacco Use   • Smoking status: Never Smoker   • Smokeless tobacco: Never Used   Vaping Use   • Vaping Use: Never used   Substance and Sexual Activity   • Alcohol use: Yes     Alcohol/week: 1.8 oz     Types: 3 Glasses of wine per week     Comment: 3 a week   • Drug use: No   • Sexual activity: Yes      Partners: Male     Comment: , one child, Cesar helms (Nepali)   Other Topics Concern   • Not on file   Social History Narrative    Lives with .     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     Social Determinants of Health     Financial Resource Strain:    • Difficulty of Paying Living Expenses: Not on file   Food Insecurity:    • Worried About Running Out of Food in the Last Year: Not on file   • Ran Out of Food in the Last Year: Not on file   Transportation Needs:    • Lack of Transportation (Medical): Not on file   • Lack of Transportation (Non-Medical): Not on file   Physical Activity:    • Days of Exercise per Week: Not on file   • Minutes of Exercise per Session: Not on file   Stress:    • Feeling of Stress : Not on file   Social Connections:    • Frequency of Communication with Friends and Family: Not on file   • Frequency of Social Gatherings with Friends and Family: Not on file   • Attends [employer]ism Services: Not on file   • Active Member of Clubs or Organizations: Not on file   • Attends Club or Organization Meetings: Not on file   • Marital Status: Not on file   Intimate Partner Violence:    • Fear of Current or Ex-Partner: Not on file   • Emotionally Abused: Not on file   • Physically Abused: Not on file   • Sexually Abused: Not on file   Housing Stability:    • Unable to Pay for Housing in the Last Year: Not on file   • Number of Places Lived in the Last Year: Not on file   • Unstable Housing in the Last Year: Not on file       Family History   Problem Relation Age of Onset   • Hypertension Mother    • Heart Disease Father    • Cancer Brother         neck   • Cancer Maternal Grandmother    • No Known Problems Maternal Grandfather    • No Known Problems Paternal Grandmother    • No Known Problems Paternal Grandfather    • Alcohol/Drug Neg Hx    • Allergies Neg Hx    • Diabetes Neg Hx    • Psychiatric Illness Neg Hx    • Stroke Neg Hx    • Thyroid Neg Hx    •  "Kidney Disease Neg Hx        Review of Systems   Constitutional: Negative for fever.   Respiratory: Negative for shortness of breath.    Cardiovascular: Negative for chest pain.   Gastrointestinal: Negative for abdominal pain.   Genitourinary: Negative for dysuria.   All other systems reviewed and are negative.      /82 (BP Location: Left arm, Patient Position: Sitting)   Pulse 67   Temp 36.4 °C (97.6 °F) (Temporal)   Ht 1.575 m (5' 2\")   Wt 52.5 kg (115 lb 12.8 oz)   SpO2 99%   BMI 21.18 kg/m²     Physical Exam  Constitutional:       General: She is not in acute distress.  HENT:      Mouth/Throat:      Pharynx: No oropharyngeal exudate.   Eyes:      General: No scleral icterus.  Neck:      Trachea: No tracheal deviation.   Cardiovascular:      Rate and Rhythm: Normal rate and regular rhythm.      Heart sounds: Normal heart sounds. No murmur heard.      Pulmonary:      Effort: Pulmonary effort is normal.      Breath sounds: Normal breath sounds. No stridor. No rales.   Abdominal:      General: Bowel sounds are normal.      Palpations: Abdomen is soft.      Tenderness: There is no abdominal tenderness.   Musculoskeletal:         General: Normal range of motion.      Cervical back: Neck supple.      Right lower leg: No edema.      Left lower leg: No edema.   Skin:     General: Skin is warm and dry.      Findings: No rash.   Neurological:      General: No focal deficit present.      Mental Status: She is alert and oriented to person, place, and time.   Psychiatric:         Mood and Affect: Mood and affect normal.         Behavior: Behavior normal.           Labs reviewed.    Recent Labs     06/12/21  0210 06/12/21  0210 10/21/21  0651 01/14/22  0715 01/14/22  0721   ALBUMIN  --   --  4.1 4.2 4.2   HDL 71  --  70  --  72   TRIGLYCERIDE 65  --  71  --  80   SODIUM 128*   < > 132* 132* 132*   POTASSIUM 3.6   < > 4.3 4.2 4.2   CHLORIDE 94*   < > 96 99 99   CO2 26   < > 27 26 24   BUN 11   < > 9 12 11 "   CREATININE 0.44*   < > 0.48* 0.40* 0.51    < > = values in this interval not displayed.       Lab Results   Component Value Date/Time    WBC 3.8 (L) 01/14/2022 07:21 AM    RBC 4.16 (L) 01/14/2022 07:21 AM    HEMOGLOBIN 13.6 01/14/2022 07:21 AM    HEMATOCRIT 39.2 01/14/2022 07:21 AM    MCV 94.2 01/14/2022 07:21 AM    MCH 32.7 01/14/2022 07:21 AM    MCHC 34.7 01/14/2022 07:21 AM    MPV 9.4 01/14/2022 07:21 AM           URINALYSIS:  Lab Results   Component Value Date/Time    COLORURINE Yellow 01/14/2022 0715    CLARITY Clear 01/14/2022 0715    SPECGRAVITY 1.013 01/14/2022 0715    PHURINE 7.5 01/14/2022 0715    KETONES Negative 01/14/2022 0715    PROTEINURIN Negative 01/14/2022 0715    BILIRUBINUR Negative 01/14/2022 0715    UROBILU 0.2 01/14/2022 0715    NITRITE Negative 01/14/2022 0715    LEUKESTERAS Negative 01/14/2022 0715    OCCULTBLOOD Negative 01/14/2022 0715     UPC  Lab Results   Component Value Date/Time    TOTPROTUR 8.0 01/14/2022 0715      Lab Results   Component Value Date/Time    CREATININEU 45.68 01/14/2022 0715         Imaging reviewed  No orders to display         Assessment:  Alysa Sarabia is a 71 y.o. female with HTN, hyponatremia who presents today to establish nephrology care.     Plan:  1. Hyponatremia  -This is longstanding, going back to at least 2007.  There have been episodes of worsening with thiazide diuretics, and I recommend avoid thiazides in the future.  The patient might have some degree of reset Osmostat.  Urine osmolality suggests excess ADH, with no discernible trigger on my evaluation, suggesting some mild SIADH, so this should be treated with conservative care -- restrict all fluids to 1 L a day or less, eat a regular diet.  Recommend discontinue salt tabs, as this only contributes to excessive thirst and worsening hypertension.  The patient is asymptomatic with sodium levels in the high 120 or low 130 range.  There is no need for treatment or aggressive  monitoring.    2. Essential hypertension  -Mildly elevated, likely due to salt tablets.  Recommend discontinue salt tablets.  Continue losartan and amlodipine.  If patient does require diuretic therapy, would recommend loop diuretic such as Lasix in the future.    3.  Anxiety  - Patient never started taking SSRI.  SSRIs can worsen hyponatremia.  If patient does develop anxiety in the future, consider alternative therapy such as psychotherapy as opposed to medication treatment.    As the patient is asymptomatic with her hyponatremia, and there is no need for aggressive monitoring or treatment, will discharge from nephrology clinic.  Return to clinic as needed    Kenny Mckinnon MD  Nephrology

## 2022-01-19 DIAGNOSIS — D64.9 ANEMIA, UNSPECIFIED TYPE: ICD-10-CM

## 2022-01-20 DIAGNOSIS — R19.5 POSITIVE FIT (FECAL IMMUNOCHEMICAL TEST): ICD-10-CM

## 2022-01-20 DIAGNOSIS — D64.9 ANEMIA, UNSPECIFIED TYPE: ICD-10-CM

## 2022-01-20 LAB — IMM ASSAY OCC BLD FITOB: POSITIVE

## 2022-02-05 DIAGNOSIS — I10 ESSENTIAL HYPERTENSION: ICD-10-CM

## 2022-02-05 RX ORDER — LOSARTAN POTASSIUM 100 MG/1
TABLET ORAL
Qty: 100 TABLET | Refills: 4 | Status: SHIPPED | OUTPATIENT
Start: 2022-02-05 | End: 2022-08-10 | Stop reason: SDUPTHER

## 2022-02-07 ENCOUNTER — TELEPHONE (OUTPATIENT)
Dept: MEDICAL GROUP | Age: 72
End: 2022-02-07

## 2022-02-07 NOTE — TELEPHONE ENCOUNTER
ESTABLISHED PATIENT PRE-VISIT PLANNING     Patient was NOT contacted to complete PVP.     Note: Patient will not be contacted if there is no indication to call.     1.  Reviewed notes from the last few office visits within the medical group: Yes    2.  If any orders were placed at last visit or intended to be done for this visit (i.e. 6 mos follow-up), do we have Results/Consult Notes?         •  Labs - Labs ordered, completed on 1/19/2022 and results are in chart.  Note: If patient appointment is for lab review and patient did not complete labs, check with provider if OK to reschedule patient until labs completed.       •  Imaging - Imaging ordered, completed and results are in chart.       •  Referrals - Referral ordered, patient has NOT been seen.    3. Is this appointment scheduled as a Hospital Follow-Up? No    4.  Immunizations were updated in Epic using Reconcile Outside Information activity? Yes    5.  Patient is due for the following Health Maintenance Topics:   Health Maintenance Due   Topic Date Due   • IMM PNEUMOCOCCAL VACCINE: 65+ Years (2 of 2 - PPSV23) 12/15/2021       - Patient plans to schedule appointment for Immunizations: PNEUMOVAX (PPSV23).    6.  AHA (Pulse8) form printed for Provider? Yes

## 2022-02-08 ENCOUNTER — OFFICE VISIT (OUTPATIENT)
Dept: MEDICAL GROUP | Age: 72
End: 2022-02-08
Payer: MEDICARE

## 2022-02-08 VITALS
BODY MASS INDEX: 21.35 KG/M2 | WEIGHT: 116 LBS | HEART RATE: 75 BPM | DIASTOLIC BLOOD PRESSURE: 90 MMHG | HEIGHT: 62 IN | TEMPERATURE: 97.6 F | OXYGEN SATURATION: 100 % | SYSTOLIC BLOOD PRESSURE: 120 MMHG

## 2022-02-08 DIAGNOSIS — D70.9 NEUTROPENIA, UNSPECIFIED TYPE (HCC): ICD-10-CM

## 2022-02-08 DIAGNOSIS — I10 ESSENTIAL HYPERTENSION: ICD-10-CM

## 2022-02-08 DIAGNOSIS — E87.1 HYPONATREMIA: ICD-10-CM

## 2022-02-08 DIAGNOSIS — D64.9 ANEMIA, UNSPECIFIED TYPE: ICD-10-CM

## 2022-02-08 DIAGNOSIS — E78.00 HYPERCHOLESTEROLEMIA: ICD-10-CM

## 2022-02-08 PROCEDURE — 99214 OFFICE O/P EST MOD 30 MIN: CPT | Performed by: INTERNAL MEDICINE

## 2022-02-08 ASSESSMENT — FIBROSIS 4 INDEX: FIB4 SCORE: 0.91

## 2022-02-08 ASSESSMENT — PATIENT HEALTH QUESTIONNAIRE - PHQ9: CLINICAL INTERPRETATION OF PHQ2 SCORE: 0

## 2022-02-08 NOTE — PROGRESS NOTES
CHIEF COMPLAINT  Hypertension, HypoNa    HPI  Alysa Sarabia is a 71 y.o. female who presents today for the following     Hypertension  Chronic hyponatremia  Interval course:  -Evaluated by vascular medicine and nephrology  - d/c Na supplement     Meds: Losartan 100 mg QD, amlodipine, 2.5 mg daily, taking as prescribed.    Measuring BP at home: no  No headaches, vision problems, tinnitus.  No chest pain/pressure, palpitations, irregular heart beats, exertional dyspnea, peripheral edema.  Diet: healthy   Low salt diet: she has additional salt  Exercise: yes  BMI: 20     CXR 6/11/2021  Negative     Hypercholesterolemia  The patient had borderline high cholesterol in the past, normalized with diet and exercise  Diet / Exercise/BMI:  As above  FH: Unknown     Neutropenia, anemia  The patient has have borderline low WBC count, and RBC count/normal MCV.  Denies frequent infections, lymphadenopathy, anorexia, weight I reviewed the results of your recent stool test. The findings were negative. A negative test means that no blood was found in your stool sample at the time of the test.   This test was ordered to evaluate for microscopic blood in the stool which can be an early sign of colorectal polyps or cancers. However, this test does not guarantee that you have no polyps or cancer at this time since many colorectal cancers do not bleed all the time even if a condition is present. Therefore medical authorities recommend that healthy adults age 50 and who have opted for a fecal occult blood test (FIT) be screened every year.    Colorectal cancer symptoms usually only appear with more advanced disease. This is why getting the recommended screening tests before any symptoms develop is so important.  Colorectal cancer may cause one or more of the symptoms below.    A change in bowel habits, such as diarrhea, constipation, or narrowing of the stool, that lasts for more than a few days   A feeling that you need to have  a bowel movement that is not relieved by doing so   Rectal bleeding, dark stools, or blood in the stool (often, though, the stool will look normal)   Cramping or abdominal (stomach area) pain   Weakness and fatigue.  Unintended weight loss     If you have any of the above symptoms, please contact this office even if you had a negative stool test.  Test was (+), referred to GI; she has history of hemorrhoids.    Reviewed PMH, PSH, FH, SH, ALL, HCM/IMM, no changes  Reviewed MEDS, no changes    Patient Active Problem List    Diagnosis Date Noted   • Age-related osteoporosis without current pathological fracture 10/26/2020   • Neutropenia (HCC) 11/04/2019   • Hyponatremia 11/04/2019   • Health care maintenance 04/25/2019   • Hypercholesterolemia 04/25/2019   • Benign liver cyst 07/24/2017   • Essential hypertension 09/03/2015     CURRENT MEDICATIONS  Current Outpatient Medications   Medication Sig Dispense Refill   • losartan (COZAAR) 100 MG Tab Take 1 tablet by mouth once daily 100 Tablet 4   • clotrimazole-betamethasone (LOTRISONE) 1-0.05 % Cream Apply 1 g topically.     • amLODIPine (NORVASC) 2.5 MG Tab Take 1 Tablet by mouth at bedtime for 360 days. 90 Tablet 3   • LUTEIN PO Take  by mouth.     • alendronate (FOSAMAX) 70 MG Tab Take 70 mg by mouth every 7 days.     • Multiple Vitamins-Minerals (PRESERVISION AREDS PO) Take 1 tablet by mouth 2 times a day.     • Cholecalciferol (VITAMIN D3) 125 MCG (5000 UT) Tab Take 1 capsule by mouth every day.     • Cyanocobalamin (VITAMIN B-12) 2500 MCG SL Tab Place 1 tablet under the tongue every day.     • ascorbic acid (ASCORBIC ACID) 500 MG Tab Take 500 mg by mouth every day.     • CALCIUM PO Take 1 tablet by mouth every day.       No current facility-administered medications for this visit.     ALLERGIES  Allergies: Hydrochlorothiazide  PAST MEDICAL HISTORY  Past Medical History:   Diagnosis Date   • Hydronephrosis    • Hypertension    • Hyponatremia    • Leukopenia    •  Liver cyst     benign, no f/u   • Small bowel obstruction (HCC)     R   • Varicose vein of leg      SURGICAL HISTORY  She  has a past surgical history that includes breast biopsy (); colon resection laparoscopic (3/27/2014); exploratory laparotomy (5/3/2016); cystoscopy stent placement (Right, 2016); retrogrades (2016); exploratory laparotomy (2016); and lumpectomy (Left, ).  SOCIAL HISTORY  Social History     Tobacco Use   • Smoking status: Never Smoker   • Smokeless tobacco: Never Used   Vaping Use   • Vaping Use: Never used   Substance Use Topics   • Alcohol use: Yes     Alcohol/week: 1.8 oz     Types: 3 Glasses of wine per week     Comment: 3 a week   • Drug use: No     Social History     Social History Narrative    Lives with .     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     FAMILY HISTORY  Family History   Problem Relation Age of Onset   • Hypertension Mother    • Heart Disease Father    • Cancer Brother         neck   • Cancer Maternal Grandmother    • No Known Problems Maternal Grandfather    • No Known Problems Paternal Grandmother    • No Known Problems Paternal Grandfather    • Alcohol/Drug Neg Hx    • Allergies Neg Hx    • Diabetes Neg Hx    • Psychiatric Illness Neg Hx    • Stroke Neg Hx    • Thyroid Neg Hx    • Kidney Disease Neg Hx      Family Status   Relation Name Status   • Mo  Alive   • Fa  Alive   • Bro  Alive   • MGMo     • MGFa     • PGMo     • PGFa     • Neg Hx  (Not Specified)     ROS   Constitutional: Negative for fever, chills, fatigue.  HENT: Negative for congestion, sore throat.  Eyes: Negative for vision problems.   Respiratory: Negative for cough, shortness of breath.  Cardiovascular: Negative for chest pain, palpitations.   Gastrointestinal: Negative for heartburn, nausea, abdominal pain.   Genitourinary: Negative for dysuria.  Musculoskeletal: Negative for significant myalgia, back and joint  "pain.   Skin: Negative for rash.   Neuro: Negative for dizziness, weakness and headaches.   Endo/Heme/Allergies: Does not bruise/bleed easily.   Psychiatric/Behavioral: Negative for depression.    PHYSICAL EXAM   Blood Pressure 120/90 (BP Location: Right arm, Patient Position: Sitting)   Pulse 75   Temperature 36.4 °C (97.6 °F)   Height 1.575 m (5' 2\")   Weight 52.6 kg (116 lb)   Oxygen Saturation 100%  Body mass index is 21.22 kg/m².  General:  NAD, well appearing  HEENT:   NC/AT, PERRLA, EOMI.  Cardiovascular: unlabored breathing, no peripheral cyanosis or swelling.  Lungs:   no respiratory distress.  Abdomen: non- distended.  Extremities:  No LE swelling.  Skin:  Warm, dry.  No erythema. No rash.   Neurologic: Alert & oriented x 3. CN II-XII grossly intact. No focal deficits.  Psychiatric:  Affect normal, mood normal, judgment normal.    Labs     Labs are reviewed and discussed with a patient  Lab Results   Component Value Date/Time    CHOLSTRLTOT 192 01/14/2022 07:21 AM     (H) 01/14/2022 07:21 AM    HDL 72 01/14/2022 07:21 AM    TRIGLYCERIDE 80 01/14/2022 07:21 AM       Lab Results   Component Value Date/Time    SODIUM 132 (L) 01/14/2022 07:21 AM    POTASSIUM 4.2 01/14/2022 07:21 AM    CHLORIDE 99 01/14/2022 07:21 AM    CO2 24 01/14/2022 07:21 AM    GLUCOSE 90 01/14/2022 07:21 AM    BUN 11 01/14/2022 07:21 AM    CREATININE 0.51 01/14/2022 07:21 AM    CREATININE 0.6 05/02/2007 09:18 AM     Lab Results   Component Value Date/Time    ALKPHOSPHAT 73 01/14/2022 07:21 AM    ASTSGOT 21 01/14/2022 07:21 AM    ALTSGPT 16 01/14/2022 07:21 AM    TBILIRUBIN 1.0 01/14/2022 07:21 AM      Lab Results   Component Value Date/Time    HBA1C 5.1 06/11/2021 02:58 PM    HBA1C 5.2 10/24/2020 09:37 AM     No results found for: TSH  No results found for: FREET4    Lab Results   Component Value Date/Time    WBC 3.8 (L) 01/14/2022 07:21 AM    RBC 4.16 (L) 01/14/2022 07:21 AM    HEMOGLOBIN 13.6 01/14/2022 07:21 AM    " HEMATOCRIT 39.2 01/14/2022 07:21 AM    MCV 94.2 01/14/2022 07:21 AM    MCH 32.7 01/14/2022 07:21 AM    MCHC 34.7 01/14/2022 07:21 AM    MPV 9.4 01/14/2022 07:21 AM    NEUTSPOLYS 59.50 01/14/2022 07:21 AM    LYMPHOCYTES 27.90 01/14/2022 07:21 AM    MONOCYTES 8.10 01/14/2022 07:21 AM    EOSINOPHILS 2.90 01/14/2022 07:21 AM    BASOPHILS 1.30 01/14/2022 07:21 AM    ANISOCYTOSIS 1+ 05/05/2016 04:10 AM      Imaging     None    Assessment and Plan     Alysa Sarabia is a 71 y.o. female    1. Essential hypertension  - Controlled, continue with current management.  - Comp Metabolic Panel; Standing    2. Hyponatremia  - Basic Metabolic Panel; Standing every 6 months, alternatively:  - Comp Metabolic Panel; Standing every 6 months    3. Hypercholesterolemia  - Controlled, continue with current management.    4. Neutropenia, unspecified type (HCC)  Borderline, follow-up labs  - CBC WITH DIFFERENTIAL; Standing    5. Anemia, unspecified type  - positive FIT  Referred to GI.    Counseling:   - Smoking:  Nonsmoker    Followup: BMP in 3 months, CMP and appointment in 6 months    All questions are answered.    Please note that this dictation was created using voice recognition software, and that there might be errors of ashok and possibly content.

## 2022-02-25 ENCOUNTER — HOSPITAL ENCOUNTER (OUTPATIENT)
Dept: RADIOLOGY | Facility: MEDICAL CENTER | Age: 72
End: 2022-02-25
Attending: NURSE PRACTITIONER
Payer: MEDICARE

## 2022-02-25 DIAGNOSIS — M81.0 SENILE OSTEOPOROSIS: ICD-10-CM

## 2022-02-25 PROCEDURE — 77080 DXA BONE DENSITY AXIAL: CPT

## 2022-03-25 ENCOUNTER — HOSPITAL ENCOUNTER (OUTPATIENT)
Dept: LAB | Facility: MEDICAL CENTER | Age: 72
End: 2022-03-25
Attending: NURSE PRACTITIONER
Payer: MEDICARE

## 2022-03-25 LAB
25(OH)D3 SERPL-MCNC: 53 NG/ML (ref 30–100)
MAGNESIUM SERPL-MCNC: 2.2 MG/DL (ref 1.5–2.5)
PHOSPHATE SERPL-MCNC: 3.2 MG/DL (ref 2.5–4.5)
PTH-INTACT SERPL-MCNC: 61.6 PG/ML (ref 14–72)

## 2022-03-25 PROCEDURE — 82306 VITAMIN D 25 HYDROXY: CPT

## 2022-03-25 PROCEDURE — 84100 ASSAY OF PHOSPHORUS: CPT

## 2022-03-25 PROCEDURE — 36415 COLL VENOUS BLD VENIPUNCTURE: CPT

## 2022-03-25 PROCEDURE — 83735 ASSAY OF MAGNESIUM: CPT

## 2022-03-25 PROCEDURE — 83970 ASSAY OF PARATHORMONE: CPT

## 2022-05-17 ENCOUNTER — APPOINTMENT (OUTPATIENT)
Dept: VASCULAR LAB | Facility: MEDICAL CENTER | Age: 72
End: 2022-05-17
Payer: MEDICARE

## 2022-05-20 ENCOUNTER — OFFICE VISIT (OUTPATIENT)
Dept: URGENT CARE | Facility: CLINIC | Age: 72
End: 2022-05-20
Payer: MEDICARE

## 2022-05-20 VITALS
DIASTOLIC BLOOD PRESSURE: 80 MMHG | OXYGEN SATURATION: 94 % | BODY MASS INDEX: 20.61 KG/M2 | HEART RATE: 82 BPM | WEIGHT: 112 LBS | TEMPERATURE: 98 F | RESPIRATION RATE: 16 BRPM | SYSTOLIC BLOOD PRESSURE: 110 MMHG | HEIGHT: 62 IN

## 2022-05-20 DIAGNOSIS — A09 TRAVELER'S DIARRHEA: ICD-10-CM

## 2022-05-20 PROCEDURE — 99213 OFFICE O/P EST LOW 20 MIN: CPT | Performed by: PHYSICIAN ASSISTANT

## 2022-05-20 RX ORDER — AZITHROMYCIN 500 MG/1
500 TABLET, FILM COATED ORAL DAILY
Qty: 3 TABLET | Refills: 0 | Status: SHIPPED | OUTPATIENT
Start: 2022-05-20 | End: 2022-08-10

## 2022-05-20 ASSESSMENT — ENCOUNTER SYMPTOMS: DIARRHEA: 1

## 2022-05-20 ASSESSMENT — FIBROSIS 4 INDEX: FIB4 SCORE: 0.91

## 2022-05-20 NOTE — PROGRESS NOTES
"Subjective     Alysa Sarabia is a 71 y.o. female who presents with Diarrhea (X 5 days, diarrhea, abdominal cramping and nausea.  Just come back from [place].)            Patient presents with:  Diarrhea: X 5 days, diarrhea, abdominal cramping and nausea.  PT denies fever or chills, bloody diarrhea or melena.  PT states she has just come back from [place].  Pt has taken some imodium with some short relief of symptoms but no resolution.          Diarrhea   This is a new problem. The current episode started in the past 7 days. The problem occurs 2 to 4 times per day. The problem has been waxing and waning. The stool consistency is described as watery. The patient states that diarrhea does not awaken her from sleep. Associated symptoms include increased flatus. Pertinent negatives include no abdominal pain, chills, fever or vomiting. Risk factors include travel to endemic area. She has tried anti-motility drug, increased fluids and change of diet for the symptoms. The treatment provided mild relief. There is no history of bowel resection, inflammatory bowel disease, irritable bowel syndrome, malabsorption, a recent abdominal surgery or short gut syndrome.       Review of Systems   Constitutional: Negative for chills and fever.   Gastrointestinal: Positive for diarrhea, flatus and nausea. Negative for abdominal pain, blood in stool, constipation, melena and vomiting.   All other systems reviewed and are negative.             Objective     /80 (BP Location: Left arm, Patient Position: Sitting, BP Cuff Size: Adult)   Pulse 82   Temp 36.7 °C (98 °F) (Temporal)   Resp 16   Ht 1.575 m (5' 2\")   Wt 50.8 kg (112 lb)   SpO2 94%   BMI 20.49 kg/m²      Physical Exam  Vitals and nursing note reviewed.   Constitutional:       General: She is not in acute distress.     Appearance: Normal appearance. She is well-developed and normal weight. She is not ill-appearing or toxic-appearing.   HENT:      Head: " Normocephalic.      Nose: Nose normal.      Mouth/Throat:      Mouth: Mucous membranes are moist.      Pharynx: No posterior oropharyngeal erythema.   Eyes:      Extraocular Movements: Extraocular movements intact.      Conjunctiva/sclera: Conjunctivae normal.      Pupils: Pupils are equal, round, and reactive to light.   Neck:      Vascular: No JVD.   Cardiovascular:      Rate and Rhythm: Normal rate and regular rhythm.      Pulses: Normal pulses.      Heart sounds: Normal heart sounds.   Pulmonary:      Effort: Pulmonary effort is normal.      Breath sounds: Normal breath sounds.   Abdominal:      General: There is no distension.      Palpations: Abdomen is soft. There is no mass.      Tenderness: There is no abdominal tenderness. There is no guarding or rebound.      Hernia: No hernia is present.   Musculoskeletal:         General: Normal range of motion.      Cervical back: Normal range of motion and neck supple.   Skin:     General: Skin is warm and dry.      Capillary Refill: Capillary refill takes less than 2 seconds.   Neurological:      General: No focal deficit present.      Mental Status: She is alert and oriented to person, place, and time.      Gait: Gait normal.   Psychiatric:         Mood and Affect: Mood normal.                             Assessment & Plan              1. Traveler's diarrhea  azithromycin (ZITHROMAX) 500 MG tablet     Patient was evaluated in clinic today while wearing appropriate personal protective equipment.      PT to begin prescription medications today as discussed.       PT to follow clear diet for 8-12 hours, then progress to bland diet for 24 hours, then progress to regular diet as tolerated.       PT should follow up with PCP in 1-2 days for re-evaluation if symptoms have not improved.      Discussed red flags and reasons to return to UC or ED.      Pt and/or family verbalized understanding of diagnosis and follow up instructions and was offered informational handout on  diagnosis.  PT discharged.

## 2022-05-28 ASSESSMENT — ENCOUNTER SYMPTOMS
CONSTIPATION: 0
FLATUS: 1
NAUSEA: 1
FEVER: 0
VOMITING: 0
CHILLS: 0
BLOOD IN STOOL: 0
ABDOMINAL PAIN: 0

## 2022-06-13 ENCOUNTER — APPOINTMENT (OUTPATIENT)
Dept: RADIOLOGY | Facility: MEDICAL CENTER | Age: 72
End: 2022-06-13
Attending: EMERGENCY MEDICINE
Payer: MEDICARE

## 2022-06-13 ENCOUNTER — HOSPITAL ENCOUNTER (EMERGENCY)
Facility: MEDICAL CENTER | Age: 72
End: 2022-06-13
Attending: EMERGENCY MEDICINE
Payer: MEDICARE

## 2022-06-13 VITALS
DIASTOLIC BLOOD PRESSURE: 86 MMHG | OXYGEN SATURATION: 98 % | RESPIRATION RATE: 15 BRPM | WEIGHT: 111.33 LBS | HEIGHT: 62 IN | HEART RATE: 71 BPM | SYSTOLIC BLOOD PRESSURE: 125 MMHG | TEMPERATURE: 98.6 F | BODY MASS INDEX: 20.49 KG/M2

## 2022-06-13 DIAGNOSIS — R31.29 MICROSCOPIC HEMATURIA: ICD-10-CM

## 2022-06-13 DIAGNOSIS — R10.9 ABDOMINAL PAIN, UNSPECIFIED ABDOMINAL LOCATION: ICD-10-CM

## 2022-06-13 DIAGNOSIS — R11.2 NON-INTRACTABLE VOMITING WITH NAUSEA, UNSPECIFIED VOMITING TYPE: ICD-10-CM

## 2022-06-13 DIAGNOSIS — R19.7 DIARRHEA, UNSPECIFIED TYPE: ICD-10-CM

## 2022-06-13 LAB
ALBUMIN SERPL BCP-MCNC: 4.1 G/DL (ref 3.2–4.9)
ALBUMIN/GLOB SERPL: 1.3 G/DL
ALP SERPL-CCNC: 64 U/L (ref 30–99)
ALT SERPL-CCNC: 14 U/L (ref 2–50)
ANION GAP SERPL CALC-SCNC: 12 MMOL/L (ref 7–16)
APPEARANCE UR: CLEAR
AST SERPL-CCNC: 20 U/L (ref 12–45)
BACTERIA #/AREA URNS HPF: NEGATIVE /HPF
BASOPHILS # BLD AUTO: 0.3 % (ref 0–1.8)
BASOPHILS # BLD: 0.01 K/UL (ref 0–0.12)
BILIRUB SERPL-MCNC: 0.5 MG/DL (ref 0.1–1.5)
BILIRUB UR QL STRIP.AUTO: NEGATIVE
BUN SERPL-MCNC: 12 MG/DL (ref 8–22)
CALCIUM SERPL-MCNC: 8.5 MG/DL (ref 8.5–10.5)
CHLORIDE SERPL-SCNC: 94 MMOL/L (ref 96–112)
CO2 SERPL-SCNC: 21 MMOL/L (ref 20–33)
COLOR UR: YELLOW
CREAT SERPL-MCNC: 0.49 MG/DL (ref 0.5–1.4)
EOSINOPHIL # BLD AUTO: 0.01 K/UL (ref 0–0.51)
EOSINOPHIL NFR BLD: 0.3 % (ref 0–6.9)
EPI CELLS #/AREA URNS HPF: NEGATIVE /HPF
ERYTHROCYTE [DISTWIDTH] IN BLOOD BY AUTOMATED COUNT: 46.6 FL (ref 35.9–50)
GFR SERPLBLD CREATININE-BSD FMLA CKD-EPI: 101 ML/MIN/1.73 M 2
GLOBULIN SER CALC-MCNC: 3.1 G/DL (ref 1.9–3.5)
GLUCOSE SERPL-MCNC: 101 MG/DL (ref 65–99)
GLUCOSE UR STRIP.AUTO-MCNC: NEGATIVE MG/DL
HCT VFR BLD AUTO: 38.9 % (ref 37–47)
HGB BLD-MCNC: 13.4 G/DL (ref 12–16)
HYALINE CASTS #/AREA URNS LPF: ABNORMAL /LPF
IMM GRANULOCYTES # BLD AUTO: 0.01 K/UL (ref 0–0.11)
IMM GRANULOCYTES NFR BLD AUTO: 0.3 % (ref 0–0.9)
KETONES UR STRIP.AUTO-MCNC: 15 MG/DL
LEUKOCYTE ESTERASE UR QL STRIP.AUTO: NEGATIVE
LIPASE SERPL-CCNC: 14 U/L (ref 11–82)
LYMPHOCYTES # BLD AUTO: 0.36 K/UL (ref 1–4.8)
LYMPHOCYTES NFR BLD: 11.3 % (ref 22–41)
MCH RBC QN AUTO: 31.6 PG (ref 27–33)
MCHC RBC AUTO-ENTMCNC: 34.4 G/DL (ref 33.6–35)
MCV RBC AUTO: 91.7 FL (ref 81.4–97.8)
MICRO URNS: ABNORMAL
MONOCYTES # BLD AUTO: 0.34 K/UL (ref 0–0.85)
MONOCYTES NFR BLD AUTO: 10.6 % (ref 0–13.4)
NEUTROPHILS # BLD AUTO: 2.47 K/UL (ref 2–7.15)
NEUTROPHILS NFR BLD: 77.2 % (ref 44–72)
NITRITE UR QL STRIP.AUTO: NEGATIVE
NRBC # BLD AUTO: 0 K/UL
NRBC BLD-RTO: 0 /100 WBC
PH UR STRIP.AUTO: 6 [PH] (ref 5–8)
PLATELET # BLD AUTO: 345 K/UL (ref 164–446)
PMV BLD AUTO: 9 FL (ref 9–12.9)
POTASSIUM SERPL-SCNC: 3.4 MMOL/L (ref 3.6–5.5)
PROT SERPL-MCNC: 7.2 G/DL (ref 6–8.2)
PROT UR QL STRIP: NEGATIVE MG/DL
RBC # BLD AUTO: 4.24 M/UL (ref 4.2–5.4)
RBC # URNS HPF: ABNORMAL /HPF
RBC UR QL AUTO: ABNORMAL
SODIUM SERPL-SCNC: 127 MMOL/L (ref 135–145)
SP GR UR STRIP.AUTO: 1.02
UROBILINOGEN UR STRIP.AUTO-MCNC: 1 MG/DL
WBC # BLD AUTO: 3.2 K/UL (ref 4.8–10.8)
WBC #/AREA URNS HPF: ABNORMAL /HPF

## 2022-06-13 PROCEDURE — 96374 THER/PROPH/DIAG INJ IV PUSH: CPT

## 2022-06-13 PROCEDURE — 700102 HCHG RX REV CODE 250 W/ 637 OVERRIDE(OP): Performed by: EMERGENCY MEDICINE

## 2022-06-13 PROCEDURE — 700105 HCHG RX REV CODE 258: Performed by: EMERGENCY MEDICINE

## 2022-06-13 PROCEDURE — 99285 EMERGENCY DEPT VISIT HI MDM: CPT

## 2022-06-13 PROCEDURE — A9270 NON-COVERED ITEM OR SERVICE: HCPCS | Performed by: EMERGENCY MEDICINE

## 2022-06-13 PROCEDURE — 85025 COMPLETE CBC W/AUTO DIFF WBC: CPT

## 2022-06-13 PROCEDURE — 81001 URINALYSIS AUTO W/SCOPE: CPT

## 2022-06-13 PROCEDURE — 36415 COLL VENOUS BLD VENIPUNCTURE: CPT

## 2022-06-13 PROCEDURE — 80053 COMPREHEN METABOLIC PANEL: CPT

## 2022-06-13 PROCEDURE — 700111 HCHG RX REV CODE 636 W/ 250 OVERRIDE (IP): Performed by: EMERGENCY MEDICINE

## 2022-06-13 PROCEDURE — 83690 ASSAY OF LIPASE: CPT

## 2022-06-13 PROCEDURE — 74176 CT ABD & PELVIS W/O CONTRAST: CPT | Mod: ME

## 2022-06-13 RX ORDER — ONDANSETRON 4 MG/1
4 TABLET, ORALLY DISINTEGRATING ORAL EVERY 8 HOURS PRN
Qty: 6 TABLET | Refills: 0 | Status: SHIPPED | OUTPATIENT
Start: 2022-06-13 | End: 2022-06-15

## 2022-06-13 RX ORDER — SODIUM CHLORIDE 9 MG/ML
1000 INJECTION, SOLUTION INTRAVENOUS ONCE
Status: COMPLETED | OUTPATIENT
Start: 2022-06-13 | End: 2022-06-13

## 2022-06-13 RX ORDER — ONDANSETRON 2 MG/ML
4 INJECTION INTRAMUSCULAR; INTRAVENOUS ONCE
Status: COMPLETED | OUTPATIENT
Start: 2022-06-13 | End: 2022-06-13

## 2022-06-13 RX ORDER — POTASSIUM CHLORIDE 20 MEQ/1
40 TABLET, EXTENDED RELEASE ORAL ONCE
Status: COMPLETED | OUTPATIENT
Start: 2022-06-13 | End: 2022-06-13

## 2022-06-13 RX ADMIN — POTASSIUM CHLORIDE 40 MEQ: 1500 TABLET, EXTENDED RELEASE ORAL at 20:21

## 2022-06-13 RX ADMIN — SODIUM CHLORIDE 1000 ML: 9 INJECTION, SOLUTION INTRAVENOUS at 19:11

## 2022-06-13 RX ADMIN — ONDANSETRON HYDROCHLORIDE 4 MG: 2 SOLUTION INTRAMUSCULAR; INTRAVENOUS at 19:11

## 2022-06-13 ASSESSMENT — FIBROSIS 4 INDEX: FIB4 SCORE: 0.91

## 2022-06-13 NOTE — ED TRIAGE NOTES
"Chief Complaint   Patient presents with   • Vomiting     X 2 days. Pt cannot keep anything down. Pt went to family doctor and was told to come here to get labs drawn   • Abdominal Pain     X 2 days in her LLQ. Pt has had diarrhea as well x 2 days       Pt walk in for above w. . Pt aox4, GCS 15. Protocol ordered      /78   Pulse 68   Temp 36.9 °C (98.5 °F) (Temporal)   Resp 18   Ht 1.575 m (5' 2\")   Wt 50.5 kg (111 lb 5.3 oz)   SpO2 98%   BMI 20.36 kg/m²     "

## 2022-06-14 NOTE — ED PROVIDER NOTES
ED Provider Note    Scribed for Julia Price M.D. by Van Early. 6/13/2022  6:15 PM    Primary care provider: Darvin Brennan M.D.  Means of arrival: Walk in  History obtained from: Patient  History limited by: None    CHIEF COMPLAINT  Chief Complaint   Patient presents with   • Vomiting     X 2 days. Pt cannot keep anything down. Pt went to family doctor and was told to come here to get labs drawn   • Abdominal Pain     X 2 days in her LLQ. Pt has had diarrhea as well x 2 days     HPI  Alysa Sarabia is a 71 y.o. female who presents for evaluation of diarrhea and vomiting onset yesterday. She states she has vomited once today, but she cannot keep anything down. She admits to associated symptoms of watery diarrhea (since yesterday, 3 episodes today), intermittent cramping abdominal pain (after vomiting), and chills, but denies bloody stools, mucus in the stool, fever, cough, sore throat, flank pain, dysuria, urinary frequency, or hematuria. No alleviating factors were reported. She says her abdominal pain and vomiting are exacerbated with eating and drinking.  adds he was sick with similar symptoms last week.  He had vomiting and diarrhea for about 3 days.  He is finally better.  Patient started getting sick around the same time he started improving.  She notes she recently traveled to [place], returning on 5/17, after which she had diarrhea; her symptoms improved with prescribed Zithromax. She was evaluated by her primary care provider prior to arrival, who advised she present to the ED for further evaluation. She has history of colon resection in 2014, before which she had similar symptoms. She also has history of bowel obstruction in 2016. She had colonoscopy in April, 2022, which she says revealed some inflammation around the area of anastomosis.  Patient reports that she intermittently has pain in her lower abdomen when her chronic abdominal issues flareup.  She sometimes will get  vomiting with his chronic and intermittent abdominal pain.  However, she never gets diarrhea with it.    PPE Note: I personally donned PPE for all patient encounters during this visit, with an N95 respirator mask and gloves.      Scribe remained outside the patient's room and did not have any contact with the patient for the duration of patient encounter.     REVIEW OF SYSTEMS  Pertinent positives include: vomiting, watery diarrhea, cramping abdominal pain (after vomiting), and chills.   Pertinent negatives include no: bloody stools, mucus in the stool, fever, cough, sore throat, flank pain, dysuria, urinary frequency, or hematuria.    See HPI for further details. All other systems are negative.    PAST MEDICAL HISTORY   has a past medical history of Hydronephrosis, Hypertension, Hyponatremia, Leukopenia, Liver cyst, Small bowel obstruction (HCC), and Varicose vein of leg.    FAMILY HISTORY  Family History   Problem Relation Age of Onset   • Hypertension Mother    • Heart Disease Father    • Cancer Brother         neck   • Cancer Maternal Grandmother    • No Known Problems Maternal Grandfather    • No Known Problems Paternal Grandmother    • No Known Problems Paternal Grandfather    • Alcohol/Drug Neg Hx    • Allergies Neg Hx    • Diabetes Neg Hx    • Psychiatric Illness Neg Hx    • Stroke Neg Hx    • Thyroid Neg Hx    • Kidney Disease Neg Hx        SOCIAL HISTORY  Social History     Tobacco Use   • Smoking status: Never Smoker   • Smokeless tobacco: Never Used   Vaping Use   • Vaping Use: Never used   Substance Use Topics   • Alcohol use: Yes     Alcohol/week: 1.8 oz     Types: 3 Glasses of wine per week     Comment: 3 a week   • Drug use: No      Social History     Substance and Sexual Activity   Drug Use No       SURGICAL HISTORY  Past Surgical History:   Procedure Laterality Date   • EXPLORATORY LAPAROTOMY  12/14/2016    Procedure: EXPLORATORY LAPAROTOMY, EXTENSIVE ADHESIOLYSIS, SMALL BOWEL ANASTOMOSIS;   "Surgeon: Mac Reddy M.D.;  Location: SURGERY [facility];  Service:    • CYSTOSCOPY STENT PLACEMENT Right 5/13/2016    Procedure: CYSTOSCOPY STENT PLACEMENT;  Surgeon: Fabrizio Sun M.D.;  Location: SURGERY [facility];  Service:    • RETROGRADES  5/13/2016    Procedure: RETROGRADES;  Surgeon: Fabrizio Sun M.D.;  Location: SURGERY [facility];  Service:    • EXPLORATORY LAPAROTOMY  5/3/2016    Procedure: EXPLORATORY LAPAROTOMY- Bowel obstruction;  Surgeon: Mac Reddy M.D.;  Location: SURGERY [facility];  Service:    • COLON RESECTION LAPAROSCOPIC  3/27/2014    Performed by Mac Reddy M.D. at [facility]   • BREAST BIOPSY  2007   • LUMPECTOMY Left 2005    benign       CURRENT MEDICATIONS  Home Medications     Reviewed by Kylah Parrish R.N. (Registered Nurse) on 06/13/22 at 1537  Med List Status: Partial   Medication Last Dose Status   alendronate (FOSAMAX) 70 MG Tab  Active   amLODIPine (NORVASC) 2.5 MG Tab  Active   ascorbic acid (ASCORBIC ACID) 500 MG Tab  Active   azithromycin (ZITHROMAX) 500 MG tablet  Active   CALCIUM PO  Active   Cholecalciferol (VITAMIN D3) 125 MCG (5000 UT) Tab  Active   clotrimazole-betamethasone (LOTRISONE) 1-0.05 % Cream  Active   Cyanocobalamin (VITAMIN B-12) 2500 MCG SL Tab  Active   losartan (COZAAR) 100 MG Tab  Active   LUTEIN PO  Active   Multiple Vitamins-Minerals (PRESERVISION AREDS PO)  Active                ALLERGIES  No Known Allergies    PHYSICAL EXAM  VITAL SIGNS: /87   Pulse 78   Temp 36.9 °C (98.5 °F) (Temporal)   Resp 16   Ht 1.575 m (5' 2\")   Wt 50.5 kg (111 lb 5.3 oz)   SpO2 97%   BMI 20.36 kg/m²      Constitutional: Well developed, well nourished; No acute distress; Non-toxic appearance.   HENT: Normocephalic, atraumatic; Bilateral external ears normal; Oropharyngeal exam deferred to COVID-19 outbreak and lack of oropharyngeal complaints.   Eyes: PERRL, EOMI, Conjunctiva normal. No discharge.   Neck:  Supple, " nontender midline; No stridor; No nuchal rigidity.   Lymphatic: No cervical lymphadenopathy noted.   Cardiovascular: Regular rate and rhythm without murmurs, rubs, or gallop.   Thorax & Lungs: No respiratory distress, breath sounds clear to auscultation bilaterally without wheezing, rales or rhonchi. Nontender chest wall. No crepitus or subcutaneous air  Abdomen: Mild tenderness in the right lower quadrant and suprapubic region with percussion. No tenderness to palpation. Soft, bowel sounds normal. No obvious masses; No pulsatile masses; no rebound, guarding, or peritoneal signs.   Skin: Good color; warm and dry without rash or petechia.  Back: Nontender, No CVA tenderness.   Extremities: Distal radial, dorsalis pedis, posterior tibial pulses are equal bilaterally; No edema; Nontender calves or saphenous, No cyanosis, No clubbing.   Musculoskeletal: Good range of motion in all major joints. No tenderness to palpation or major deformities noted.   Neurologic: Alert & oriented x 4, clear speech.     LABS/RADIOLOGY/PROCEDURES  Results for orders placed or performed during the hospital encounter of 06/13/22   CBC with Differential   Result Value Ref Range    WBC 3.2 (L) 4.8 - 10.8 K/uL    RBC 4.24 4.20 - 5.40 M/uL    Hemoglobin 13.4 12.0 - 16.0 g/dL    Hematocrit 38.9 37.0 - 47.0 %    MCV 91.7 81.4 - 97.8 fL    MCH 31.6 27.0 - 33.0 pg    MCHC 34.4 33.6 - 35.0 g/dL    RDW 46.6 35.9 - 50.0 fL    Platelet Count 345 164 - 446 K/uL    MPV 9.0 9.0 - 12.9 fL    Neutrophils-Polys 77.20 (H) 44.00 - 72.00 %    Lymphocytes 11.30 (L) 22.00 - 41.00 %    Monocytes 10.60 0.00 - 13.40 %    Eosinophils 0.30 0.00 - 6.90 %    Basophils 0.30 0.00 - 1.80 %    Immature Granulocytes 0.30 0.00 - 0.90 %    Nucleated RBC 0.00 /100 WBC    Neutrophils (Absolute) 2.47 2.00 - 7.15 K/uL    Lymphs (Absolute) 0.36 (L) 1.00 - 4.80 K/uL    Monos (Absolute) 0.34 0.00 - 0.85 K/uL    Eos (Absolute) 0.01 0.00 - 0.51 K/uL    Baso (Absolute) 0.01 0.00 - 0.12  K/uL    Immature Granulocytes (abs) 0.01 0.00 - 0.11 K/uL    NRBC (Absolute) 0.00 K/uL   Complete Metabolic Panel   Result Value Ref Range    Sodium 127 (L) 135 - 145 mmol/L    Potassium 3.4 (L) 3.6 - 5.5 mmol/L    Chloride 94 (L) 96 - 112 mmol/L    Co2 21 20 - 33 mmol/L    Anion Gap 12.0 7.0 - 16.0    Glucose 101 (H) 65 - 99 mg/dL    Bun 12 8 - 22 mg/dL    Creatinine 0.49 (L) 0.50 - 1.40 mg/dL    Calcium 8.5 8.5 - 10.5 mg/dL    AST(SGOT) 20 12 - 45 U/L    ALT(SGPT) 14 2 - 50 U/L    Alkaline Phosphatase 64 30 - 99 U/L    Total Bilirubin 0.5 0.1 - 1.5 mg/dL    Albumin 4.1 3.2 - 4.9 g/dL    Total Protein 7.2 6.0 - 8.2 g/dL    Globulin 3.1 1.9 - 3.5 g/dL    A-G Ratio 1.3 g/dL   Lipase   Result Value Ref Range    Lipase 14 11 - 82 U/L   Urinalysis    Specimen: Urine, Clean Catch   Result Value Ref Range    Color Yellow     Character Clear     Specific Gravity 1.021 <1.035    Ph 6.0 5.0 - 8.0    Glucose Negative Negative mg/dL    Ketones 15 (A) Negative mg/dL    Protein Negative Negative mg/dL    Bilirubin Negative Negative    Urobilinogen, Urine 1.0 Negative    Nitrite Negative Negative    Leukocyte Esterase Negative Negative    Occult Blood Trace (A) Negative    Micro Urine Req Microscopic    ESTIMATED GFR   Result Value Ref Range    GFR (CKD-EPI) 101 >60 mL/min/1.73 m 2   URINE MICROSCOPIC (W/UA)   Result Value Ref Range    WBC 0-2 /hpf    RBC 10-20 (A) /hpf    Bacteria Negative None /hpf    Epithelial Cells Negative /hpf    Hyaline Cast 0-2 /lpf        All labs reviewed by me.    CT-ABDOMEN-PELVIS W/O   Final Result      1.  No acute intra-abdominal findings are identified      2.  Right renal pelviectasis is similar to prior exams      3.  Atherosclerosis.      4.  Large calcified uterine leiomyoma        The radiologist's interpretation of all radiological studies have been reviewed by me.    COURSE & MEDICAL DECISION MAKING  Pertinent Labs & Imaging studies reviewed. (See chart for details)    Reviewed patient's  old medical records which showed the patient has history of chronic hyponatremia; review of prior labs reveals she is close to baseline sodium of 124-132.     6:15 PM - Patient seen and examined at bedside. Discussed plan of care, including imaging and labs. Patient agrees to the plan of care. She was treated with Zofran 4 mg injection and resuscitated with 1 L NS IV for her symptoms.     7:19 PM - I reevaluated the patient at bedside. I discussed the patient's diagnostic study results as shown above. I discussed plan for discharge and follow up as outlined below. The patient is stable for discharge at this time and will return for any new or worsening symptoms. Patient verbalizes understanding and support with my plan for discharge.      Patient presents to the ER with complaint of nausea, vomiting and diarrhea as well as intermittent lower abdominal cramping and pain which began 2 days ago.  Patient's  had nausea, vomiting and diarrhea early last week.  His symptoms lasted 3 days and then resolved.  He is currently symptom-free.  Her symptoms started a couple days after he is did.  Patient has history of a cecal mass with colon resection.  She also has history of small bowel obstruction.   says that sometimes when she gets flareups of her underlying chronic abdominal discomforts and sometimes vomits when she gets these intermittent crampy pains which she feels is related to inflammation around her anastomosis.  However, she never gets diarrhea.  Patient is otherwise well-appearing.  Her abdomen is benign.  Since she has history of small bowel obstruction with abdominal surgery, I went ahead and did a CT scan of the abdomen pelvis.  CT scan is negative for any acute intra-abdominal pathology.  No signs of colitis, diverticulitis, appendicitis, obstruction or perforation.  Labs are fairly unremarkable and are at baseline when compared to previous labs we have on the patient over the last year or so.   She has reported to have chronic hyponatremia.  Her white count has been a little low over the last year or so.  Sodium today is 127.  Potassium is slightly low at 3.4.  I gave her a potassium pill here in the ER.  She was given some IV fluids for her mild dehydration.  I have encouraged her to drink Pedialyte as that is an excellent source of electrolytes until her diarrhea is resolved.  She will go home with prescription for Zofran.  Vitals are normal and stable.  She is not septic or toxic.  She is afebrile here in the ER.  I suspect she has a viral gastrointestinal syndrome, much like her  had earlier in the week.  His symptoms lasted 3 days.  She is only been sick for 2 days.  Perhaps by tomorrow she will be feeling better.  In the meantime, she has been given strict return precautions and discharge instructions for nausea, vomiting, diarrhea and abdominal pain and she understands treatment plan and follow-up.    HYDRATION: Based on the patient's presentation of Acute Diarrhea and Acute Vomiting the patient was given IV fluids. IV Hydration was used because oral hydration was not adequate alone. Upon recheck following hydration, the patient was improved.    The patient will return for new or worsening symptoms and is stable at the time of discharge.    DISPOSITION:  Patient will be discharged home in stable condition.    FOLLOW UP:  Darvin Brennan M.D.  [address] [place] [address] [address] [place] [id number] [phone]    Schedule an appointment as soon as possible for a visit in 2 days  If symptoms worsen return to ER      OUTPATIENT MEDICATIONS:  New Prescriptions    ONDANSETRON (ZOFRAN ODT) 4 MG TABLET DISPERSIBLE    Take 1 Tablet by mouth every 8 hours as needed for Nausea for up to 2 days.        FINAL IMPRESSION  1. Non-intractable vomiting with nausea, unspecified vomiting type Acute   2. Diarrhea, unspecified type Acute   3. Abdominal pain, unspecified abdominal location Acute   4. Microscopic hematuria Acute          I, Van Early (Kevinibe), am scribing for, and in the presence of, Julia Price M.D..    Electronically signed by: Van Early (Luis Antonio), 6/13/2022    Julia GONSALES M.D. personally performed the services described in this documentation, as scribed by Van Early in my presence, and it is both accurate and complete.    This dictation has been created using voice recognition software. The accuracy of the dictation is limited by the abilities of the software. I expect there may be some errors of grammar and possibly content. I made every attempt to manually correct the errors within my dictation. However, errors related to voice recognition software may still exist and should be interpreted within the appropriate context.    The note accurately reflects work and decisions made by me.  Julia Price M.D.  6/13/2022  8:15 PM

## 2022-06-14 NOTE — DISCHARGE INSTRUCTIONS
Clear liquid diet for the next 12 to 24 hours and then slowly advance her diet as tolerated.    Return to the ER immediately for any worsening abdominal pain, changing abdominal pain, fevers over 100.4, shaking chills, recurrent nausea/vomiting despite the home nausea medication, blood in vomit, blood in stool, pain with urination, cloudy or foul-smelling urine, or for any concerns.    Drink Pedialyte to help stay hydrated and to help keep your electrolytes stable until the diarrhea has resolved.    You are found to have a small amount of microscopic blood in your urine today.  This will need to be followed up.  Please speak with your primary care physician about the microscopic blood found in your urine today so that you can get a repeat urine test in about a week to be sure it is clearing up.

## 2022-06-23 ENCOUNTER — OFFICE VISIT (OUTPATIENT)
Dept: MEDICAL GROUP | Age: 72
End: 2022-06-23
Payer: MEDICARE

## 2022-06-23 VITALS
HEIGHT: 62 IN | HEART RATE: 76 BPM | TEMPERATURE: 98.6 F | OXYGEN SATURATION: 98 % | RESPIRATION RATE: 16 BRPM | DIASTOLIC BLOOD PRESSURE: 72 MMHG | SYSTOLIC BLOOD PRESSURE: 110 MMHG | WEIGHT: 111.6 LBS | BODY MASS INDEX: 20.54 KG/M2

## 2022-06-23 DIAGNOSIS — R11.0 NAUSEA: ICD-10-CM

## 2022-06-23 DIAGNOSIS — R31.29 MICROHEMATURIA: ICD-10-CM

## 2022-06-23 DIAGNOSIS — Z09 HOSPITAL DISCHARGE FOLLOW-UP: ICD-10-CM

## 2022-06-23 DIAGNOSIS — R19.7 DIARRHEA, UNSPECIFIED TYPE: ICD-10-CM

## 2022-06-23 LAB
APPEARANCE UR: CLEAR
BILIRUB UR STRIP-MCNC: NEGATIVE MG/DL
COLOR UR AUTO: YELLOW
GLUCOSE UR STRIP.AUTO-MCNC: NEGATIVE MG/DL
KETONES UR STRIP.AUTO-MCNC: NEGATIVE MG/DL
LEUKOCYTE ESTERASE UR QL STRIP.AUTO: NEGATIVE
NITRITE UR QL STRIP.AUTO: NEGATIVE
PH UR STRIP.AUTO: 7.5 [PH] (ref 5–8)
PROT UR QL STRIP: NEGATIVE MG/DL
RBC UR QL AUTO: NEGATIVE
SP GR UR STRIP.AUTO: 1.01
UROBILINOGEN UR STRIP-MCNC: 0.2 MG/DL

## 2022-06-23 PROCEDURE — 81002 URINALYSIS NONAUTO W/O SCOPE: CPT | Performed by: INTERNAL MEDICINE

## 2022-06-23 PROCEDURE — 99214 OFFICE O/P EST MOD 30 MIN: CPT | Performed by: INTERNAL MEDICINE

## 2022-06-23 ASSESSMENT — FIBROSIS 4 INDEX: FIB4 SCORE: 1.069044967649697539

## 2022-06-23 NOTE — PROGRESS NOTES
CHIEF COMPLAINT  ER follow-up    HPI  Alysa Sarabia is a 71 y.o. female who presents today for the following     Hospital ER discharge follow-up, nausea/vomiting, diarrhea  71 y.o. female who presented to the ER on 6/13/2022, 10 days ago for evaluation of diarrhea and vomiting onset yesterday; she vomited once, but she could not keep anything down. Associated symptoms were  watery diarrhea (since yesterday, 3 episodes today), intermittent cramping abdominal pain (after vomiting), and chills, but denies bloody stools, mucus in the stool, fever, cough, sore throat, flank pain, dysuria, urinary frequency, or hematuria. No alleviating factors were reported.   Abdominal pain and vomiting were exacerbated with eating and drinking.  She recently traveled to [place], returning on 5/17/22, after which she had diarrhea; her symptoms improved with prescribed Zithromax.    She has history of colon resection in 2014, before which she had similar symptoms. She also has history of bowel obstruction in 2016. She had colonoscopy in April, 2022, which she says revealed some inflammation around the area of anastomosis.      ER, she received IV fluids.  CBC showed decreased WBC count.  CMP, hypokalemia, hyponatremia chronic).  UA: Microhematuria, for which she is concerned.    Current all symptoms resolved.    Reviewed PMH, PSH, FH, SH, ALL, HCM/IMM, no changes  Reviewed MEDS, no changes    Patient Active Problem List    Diagnosis Date Noted   • Age-related osteoporosis without current pathological fracture 10/26/2020   • Neutropenia (HCC) 11/04/2019   • Hyponatremia 11/04/2019   • Health care maintenance 04/25/2019   • Hypercholesterolemia 04/25/2019   • Benign liver cyst 07/24/2017   • Essential hypertension 09/03/2015     CURRENT MEDICATIONS  Current Outpatient Medications   Medication Sig Dispense Refill   • azithromycin (ZITHROMAX) 500 MG tablet Take 1 Tablet by mouth every day. 3 Tablet 0   • losartan (COZAAR) 100 MG Tab  Take 1 tablet by mouth once daily 100 Tablet 4   • clotrimazole-betamethasone (LOTRISONE) 1-0.05 % Cream Apply 1 g topically.     • amLODIPine (NORVASC) 2.5 MG Tab Take 1 Tablet by mouth at bedtime for 360 days. 90 Tablet 3   • LUTEIN PO Take  by mouth.     • alendronate (FOSAMAX) 70 MG Tab Take 70 mg by mouth every 7 days.     • Multiple Vitamins-Minerals (PRESERVISION AREDS PO) Take 1 tablet by mouth 2 times a day.     • Cholecalciferol (VITAMIN D3) 125 MCG (5000 UT) Tab Take 1 capsule by mouth every day.     • Cyanocobalamin (VITAMIN B-12) 2500 MCG SL Tab Place 1 tablet under the tongue every day.     • ascorbic acid (ASCORBIC ACID) 500 MG Tab Take 500 mg by mouth every day.     • CALCIUM PO Take 1 tablet by mouth every day.       No current facility-administered medications for this visit.     ALLERGIES  Allergies: Patient has no known allergies.  PAST MEDICAL HISTORY  Past Medical History:   Diagnosis Date   • Hydronephrosis    • Hypertension    • Hyponatremia    • Leukopenia    • Liver cyst     benign, no f/u   • Small bowel obstruction (HCC)     R   • Varicose vein of leg      SURGICAL HISTORY  She  has a past surgical history that includes breast biopsy (2007); colon resection laparoscopic (3/27/2014); exploratory laparotomy (5/3/2016); cystoscopy stent placement (Right, 5/13/2016); retrogrades (5/13/2016); exploratory laparotomy (12/14/2016); and lumpectomy (Left, 2005).  SOCIAL HISTORY  Social History     Tobacco Use   • Smoking status: Never Smoker   • Smokeless tobacco: Never Used   Vaping Use   • Vaping Use: Never used   Substance Use Topics   • Alcohol use: Yes     Alcohol/week: 1.8 oz     Types: 3 Glasses of wine per week     Comment: 3 a week   • Drug use: No     Social History     Social History Narrative    Lives with .     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     FAMILY HISTORY  Family History   Problem Relation Age of Onset   • Hypertension Mother    • Heart  "Disease Father    • Cancer Brother         neck   • Cancer Maternal Grandmother    • No Known Problems Maternal Grandfather    • No Known Problems Paternal Grandmother    • No Known Problems Paternal Grandfather    • Alcohol/Drug Neg Hx    • Allergies Neg Hx    • Diabetes Neg Hx    • Psychiatric Illness Neg Hx    • Stroke Neg Hx    • Thyroid Neg Hx    • Kidney Disease Neg Hx      Family Status   Relation Name Status   • Mo  Alive   • Fa  Alive   • Bro  Alive   • MGMo     • MGFa     • PGMo     • PGFa     • Neg Hx  (Not Specified)     ROS   Constitutional: Negative for fever, chills.  HENT: Negative for congestion, sore throat.  Respiratory: Negative for cough, shortness of breath.  Cardiovascular: Negative for chest pain, palpitations.   Gastrointestinal: Per HPI.  Genitourinary: Negative for dysuria.  Musculoskeletal: Negative for significant myalgia, back and joint pain.   Skin: Negative for rash.   Neuro: Negative for dizziness, weakness and headaches.   Endo/Heme/Allergies: Does not bruise/bleed easily.   Psychiatric/Behavioral: Negative for depression.    PHYSICAL EXAM   Blood Pressure 110/72 (BP Location: Left arm, Patient Position: Sitting, BP Cuff Size: Adult)   Pulse 76   Temperature 37 °C (98.6 °F) (Temporal)   Respiration 16   Height 1.575 m (5' 2\")   Weight 50.6 kg (111 lb 9.6 oz)   Oxygen Saturation 98%  Body mass index is 20.41 kg/m².  General:  NAD, well appearing  HEENT:   NC/AT, PERRLA, EOMI.  Cardiovascular: unlabored breathing, no peripheral cyanosis or swelling.  Lungs:   no respiratory distress.  Abdomen: non- distended.  Extremities:  No LE swelling.  Skin:  Warm, dry.  No erythema. No rash.   Neurologic: Alert & oriented x 3. CN II-XII grossly intact. No focal deficits.  Psychiatric:  Affect normal, mood normal, judgment normal.    Labs     Labs are reviewed and discussed with a patient    POCT UA: neg    Lab Results   Component Value Date/Time    " CHOLSTRLTOT 192 01/14/2022 07:21 AM     (H) 01/14/2022 07:21 AM    HDL 72 01/14/2022 07:21 AM    TRIGLYCERIDE 80 01/14/2022 07:21 AM       Lab Results   Component Value Date/Time    SODIUM 127 (L) 06/13/2022 03:54 PM    POTASSIUM 3.4 (L) 06/13/2022 03:54 PM    CHLORIDE 94 (L) 06/13/2022 03:54 PM    CO2 21 06/13/2022 03:54 PM    GLUCOSE 101 (H) 06/13/2022 03:54 PM    BUN 12 06/13/2022 03:54 PM    CREATININE 0.49 (L) 06/13/2022 03:54 PM    CREATININE 0.6 05/02/2007 09:18 AM     Lab Results   Component Value Date/Time    ALKPHOSPHAT 64 06/13/2022 03:54 PM    ASTSGOT 20 06/13/2022 03:54 PM    ALTSGPT 14 06/13/2022 03:54 PM    TBILIRUBIN 0.5 06/13/2022 03:54 PM      Lab Results   Component Value Date/Time    HBA1C 5.1 06/11/2021 02:58 PM    HBA1C 5.2 10/24/2020 09:37 AM     No results found for: TSH  No results found for: FREET4    Lab Results   Component Value Date/Time    WBC 3.2 (L) 06/13/2022 03:54 PM    RBC 4.24 06/13/2022 03:54 PM    HEMOGLOBIN 13.4 06/13/2022 03:54 PM    HEMATOCRIT 38.9 06/13/2022 03:54 PM    MCV 91.7 06/13/2022 03:54 PM    MCH 31.6 06/13/2022 03:54 PM    MCHC 34.4 06/13/2022 03:54 PM    MPV 9.0 06/13/2022 03:54 PM    NEUTSPOLYS 77.20 (H) 06/13/2022 03:54 PM    LYMPHOCYTES 11.30 (L) 06/13/2022 03:54 PM    MONOCYTES 10.60 06/13/2022 03:54 PM    EOSINOPHILS 0.30 06/13/2022 03:54 PM    BASOPHILS 0.30 06/13/2022 03:54 PM    ANISOCYTOSIS 1+ 05/05/2016 04:10 AM      Imaging     None    Assessment and Plan     Alysa Sarabia is a 71 y.o. female    1. Hospital discharge follow-up  All symptoms resolved, urinalysis in the office was negative.  2. Nausea  3. Diarrhea, unspecified type  4. Microhematuria  - POCT Urinalysis  - NEG    Counseling:   - Smoking:  Nonsmoker    Followup: Return if symptoms worsen or fail to improve.    All questions are answered.    Please note that this dictation was created using voice recognition software, and that there might be errors of ashok and possibly  content.

## 2022-06-30 ENCOUNTER — TELEPHONE (OUTPATIENT)
Dept: HEALTH INFORMATION MANAGEMENT | Facility: OTHER | Age: 72
End: 2022-06-30

## 2022-08-04 ENCOUNTER — HOSPITAL ENCOUNTER (OUTPATIENT)
Dept: LAB | Facility: MEDICAL CENTER | Age: 72
End: 2022-08-04
Attending: INTERNAL MEDICINE
Payer: MEDICARE

## 2022-08-04 DIAGNOSIS — E78.00 HYPERCHOLESTEROLEMIA: ICD-10-CM

## 2022-08-04 DIAGNOSIS — D70.9 NEUTROPENIA, UNSPECIFIED TYPE (HCC): ICD-10-CM

## 2022-08-04 DIAGNOSIS — E87.1 HYPONATREMIA: ICD-10-CM

## 2022-08-04 DIAGNOSIS — I10 ESSENTIAL HYPERTENSION: ICD-10-CM

## 2022-08-04 LAB
ALBUMIN SERPL BCP-MCNC: 4.5 G/DL (ref 3.2–4.9)
ALBUMIN/GLOB SERPL: 1.8 G/DL
ALP SERPL-CCNC: 66 U/L (ref 30–99)
ALT SERPL-CCNC: 19 U/L (ref 2–50)
ANION GAP SERPL CALC-SCNC: 9 MMOL/L (ref 7–16)
AST SERPL-CCNC: 28 U/L (ref 12–45)
BASOPHILS # BLD AUTO: 1.5 % (ref 0–1.8)
BASOPHILS # BLD: 0.05 K/UL (ref 0–0.12)
BILIRUB SERPL-MCNC: 1 MG/DL (ref 0.1–1.5)
BUN SERPL-MCNC: 10 MG/DL (ref 8–22)
CALCIUM SERPL-MCNC: 8.9 MG/DL (ref 8.5–10.5)
CHLORIDE SERPL-SCNC: 97 MMOL/L (ref 96–112)
CHOLEST SERPL-MCNC: 201 MG/DL (ref 100–199)
CO2 SERPL-SCNC: 26 MMOL/L (ref 20–33)
CREAT SERPL-MCNC: 0.42 MG/DL (ref 0.5–1.4)
EOSINOPHIL # BLD AUTO: 0.06 K/UL (ref 0–0.51)
EOSINOPHIL NFR BLD: 1.7 % (ref 0–6.9)
ERYTHROCYTE [DISTWIDTH] IN BLOOD BY AUTOMATED COUNT: 47.8 FL (ref 35.9–50)
FASTING STATUS PATIENT QL REPORTED: NORMAL
GFR SERPLBLD CREATININE-BSD FMLA CKD-EPI: 105 ML/MIN/1.73 M 2
GLOBULIN SER CALC-MCNC: 2.5 G/DL (ref 1.9–3.5)
GLUCOSE SERPL-MCNC: 82 MG/DL (ref 65–99)
HCT VFR BLD AUTO: 39.7 % (ref 37–47)
HDLC SERPL-MCNC: 81 MG/DL
HGB BLD-MCNC: 13.4 G/DL (ref 12–16)
IMM GRANULOCYTES # BLD AUTO: 0.01 K/UL (ref 0–0.11)
IMM GRANULOCYTES NFR BLD AUTO: 0.3 % (ref 0–0.9)
LDLC SERPL CALC-MCNC: 109 MG/DL
LYMPHOCYTES # BLD AUTO: 1.19 K/UL (ref 1–4.8)
LYMPHOCYTES NFR BLD: 34.7 % (ref 22–41)
MCH RBC QN AUTO: 31.4 PG (ref 27–33)
MCHC RBC AUTO-ENTMCNC: 33.8 G/DL (ref 33.6–35)
MCV RBC AUTO: 93 FL (ref 81.4–97.8)
MONOCYTES # BLD AUTO: 0.37 K/UL (ref 0–0.85)
MONOCYTES NFR BLD AUTO: 10.8 % (ref 0–13.4)
NEUTROPHILS # BLD AUTO: 1.75 K/UL (ref 2–7.15)
NEUTROPHILS NFR BLD: 51 % (ref 44–72)
NRBC # BLD AUTO: 0 K/UL
NRBC BLD-RTO: 0 /100 WBC
PLATELET # BLD AUTO: 350 K/UL (ref 164–446)
PMV BLD AUTO: 9.4 FL (ref 9–12.9)
POTASSIUM SERPL-SCNC: 4 MMOL/L (ref 3.6–5.5)
PROT SERPL-MCNC: 7 G/DL (ref 6–8.2)
RBC # BLD AUTO: 4.27 M/UL (ref 4.2–5.4)
SODIUM SERPL-SCNC: 132 MMOL/L (ref 135–145)
TRIGL SERPL-MCNC: 53 MG/DL (ref 0–149)
WBC # BLD AUTO: 3.4 K/UL (ref 4.8–10.8)

## 2022-08-04 PROCEDURE — 85025 COMPLETE CBC W/AUTO DIFF WBC: CPT

## 2022-08-04 PROCEDURE — 80053 COMPREHEN METABOLIC PANEL: CPT

## 2022-08-04 PROCEDURE — 80061 LIPID PANEL: CPT

## 2022-08-04 PROCEDURE — 36415 COLL VENOUS BLD VENIPUNCTURE: CPT

## 2022-08-10 ENCOUNTER — OFFICE VISIT (OUTPATIENT)
Dept: MEDICAL GROUP | Age: 72
End: 2022-08-10
Payer: MEDICARE

## 2022-08-10 VITALS
TEMPERATURE: 97 F | OXYGEN SATURATION: 98 % | SYSTOLIC BLOOD PRESSURE: 118 MMHG | DIASTOLIC BLOOD PRESSURE: 82 MMHG | HEIGHT: 62 IN | BODY MASS INDEX: 20.83 KG/M2 | HEART RATE: 75 BPM | WEIGHT: 113.2 LBS

## 2022-08-10 DIAGNOSIS — E78.00 HYPERCHOLESTEROLEMIA: ICD-10-CM

## 2022-08-10 DIAGNOSIS — D70.9 NEUTROPENIA, UNSPECIFIED TYPE (HCC): ICD-10-CM

## 2022-08-10 DIAGNOSIS — Z12.31 ENCOUNTER FOR SCREENING MAMMOGRAM FOR MALIGNANT NEOPLASM OF BREAST: ICD-10-CM

## 2022-08-10 DIAGNOSIS — I10 ESSENTIAL HYPERTENSION: ICD-10-CM

## 2022-08-10 DIAGNOSIS — E87.1 HYPONATREMIA: ICD-10-CM

## 2022-08-10 PROCEDURE — 99214 OFFICE O/P EST MOD 30 MIN: CPT | Performed by: INTERNAL MEDICINE

## 2022-08-10 RX ORDER — LOSARTAN POTASSIUM 100 MG/1
100 TABLET ORAL DAILY
Qty: 100 TABLET | Refills: 4 | Status: SHIPPED | OUTPATIENT
Start: 2022-08-10 | End: 2023-08-22 | Stop reason: SDUPTHER

## 2022-08-10 RX ORDER — AMLODIPINE BESYLATE 2.5 MG/1
2.5 TABLET ORAL
Qty: 90 TABLET | Refills: 4 | Status: SHIPPED | OUTPATIENT
Start: 2022-08-10 | End: 2023-08-17 | Stop reason: SDUPTHER

## 2022-08-10 ASSESSMENT — FIBROSIS 4 INDEX: FIB4 SCORE: 1.284728109675145889

## 2022-08-10 NOTE — PROGRESS NOTES
CHIEF COMPLAINT  HTN, labs    HPI  Alysa Sarabia is a 72 y.o. female who presents today for the following     Hypertension  Chronic hyponatremia  Interval course:  -Evaluated by vascular medicine and nephrology  - d/c Na supplement     Meds: Losartan 100 mg QD, amlodipine, 2.5 mg daily, taking as prescribed.    Measuring BP at home: no  No headaches, vision problems, tinnitus.  No chest pain/pressure, palpitations, irregular heart beats, exertional dyspnea, peripheral edema.  Diet: healthy   Low salt diet: she has additional salt  Exercise: yes  BMI: 20     CXR 6/11/2021  Negative     Hypercholesterolemia  The patient had borderline high cholesterol in the past, normalized with diet and exercise  Diet / Exercise/BMI:  As above  FH: Unknown     Neutropenia  The patient has have borderline low WBC/neutrophil count/normal MCV.    Reviewed PMH, PSH, FH, SH, ALL, HCM/IMM, no changes  Reviewed MEDS, no changes    Patient Active Problem List    Diagnosis Date Noted    Age-related osteoporosis without current pathological fracture 10/26/2020    Neutropenia (HCC) 11/04/2019    Hyponatremia 11/04/2019    Health care maintenance 04/25/2019    Hypercholesterolemia 04/25/2019    Benign liver cyst 07/24/2017    Essential hypertension 09/03/2015     CURRENT MEDICATIONS  Current Outpatient Medications   Medication Sig Dispense Refill    azithromycin (ZITHROMAX) 500 MG tablet Take 1 Tablet by mouth every day. 3 Tablet 0    losartan (COZAAR) 100 MG Tab Take 1 tablet by mouth once daily 100 Tablet 4    clotrimazole-betamethasone (LOTRISONE) 1-0.05 % Cream Apply 1 g topically.      amLODIPine (NORVASC) 2.5 MG Tab Take 1 Tablet by mouth at bedtime for 360 days. 90 Tablet 3    LUTEIN PO Take  by mouth.      alendronate (FOSAMAX) 70 MG Tab Take 70 mg by mouth every 7 days.      Multiple Vitamins-Minerals (PRESERVISION AREDS PO) Take 1 tablet by mouth 2 times a day.      Cholecalciferol (VITAMIN D3) 125 MCG (5000 UT) Tab Take 1  capsule by mouth every day.      Cyanocobalamin (VITAMIN B-12) 2500 MCG SL Tab Place 1 tablet under the tongue every day.      ascorbic acid (ASCORBIC ACID) 500 MG Tab Take 500 mg by mouth every day.      CALCIUM PO Take 1 tablet by mouth every day.       No current facility-administered medications for this visit.     ALLERGIES  Allergies: Patient has no known allergies.  PAST MEDICAL HISTORY  Past Medical History:   Diagnosis Date    Hydronephrosis     Hypertension     Hyponatremia     Leukopenia     Liver cyst     benign, no f/u    Small bowel obstruction (HCC)     R    Varicose vein of leg      SURGICAL HISTORY  She  has a past surgical history that includes breast biopsy (); colon resection laparoscopic (3/27/2014); exploratory laparotomy (5/3/2016); cystoscopy stent placement (Right, 2016); retrogrades (2016); exploratory laparotomy (2016); and lumpectomy (Left, ).  SOCIAL HISTORY  Social History     Tobacco Use    Smoking status: Never    Smokeless tobacco: Never   Vaping Use    Vaping Use: Never used   Substance Use Topics    Alcohol use: Yes     Alcohol/week: 1.8 oz     Types: 3 Glasses of wine per week     Comment: 3 a week    Drug use: No     Social History     Social History Narrative    Lives with .     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     FAMILY HISTORY  Family History   Problem Relation Age of Onset    Hypertension Mother     Heart Disease Father     Cancer Brother         neck    Cancer Maternal Grandmother     No Known Problems Maternal Grandfather     No Known Problems Paternal Grandmother     No Known Problems Paternal Grandfather     Alcohol/Drug Neg Hx     Allergies Neg Hx     Diabetes Neg Hx     Psychiatric Illness Neg Hx     Stroke Neg Hx     Thyroid Neg Hx     Kidney Disease Neg Hx      Family Status   Relation Name Status    Mo  Alive    Fa  Alive    Bro  Alive    MGMo      MGFa      PGMo      PGFa   "    Neg Hx  (Not Specified)     ROS   Constitutional: Negative for fever, chills, fatigue.  HENT: Negative for congestion, sore throat.  Eyes: Negative for vision problems.   Respiratory: Negative for cough, shortness of breath.  Cardiovascular: Negative for chest pain, palpitations.   Gastrointestinal: Negative for heartburn, nausea, abdominal pain.   Genitourinary: Negative for dysuria.  Musculoskeletal: Negative for significant myalgia, back and joint pain.   Skin: Negative for rash.   Neuro: Negative for dizziness, weakness and headaches.   Endo/Heme/Allergies: Does not bruise/bleed easily.   Psychiatric/Behavioral: Negative for depression.    PHYSICAL EXAM   Blood Pressure 118/82 (BP Location: Left arm, Patient Position: Sitting, BP Cuff Size: Adult)   Pulse 75   Temperature 36.1 °C (97 °F) (Temporal)   Height 1.575 m (5' 2\")   Weight 51.3 kg (113 lb 3.2 oz)   Oxygen Saturation 98%   Body Mass Index 20.70 kg/m²   General:  NAD, well appearing  HEENT:   NC/AT, PERRLA, EOMI.  Cardiovascular: unlabored breathing, no peripheral cyanosis or swelling.  Lungs:   no respiratory distress.  Abdomen: non- distended.  Extremities:  No LE swelling.  Skin:  Warm, dry.  No erythema. No rash.   Neurologic: Alert & oriented x 3. CN II-XII grossly intact. No focal deficits.  Psychiatric:  Affect normal, mood normal, judgment normal.    Labs     Labs are reviewed and discussed with a patient  Lab Results   Component Value Date/Time    CHOLSTRLTOT 201 (H) 2022 06:42 AM     (H) 2022 06:42 AM    HDL 81 2022 06:42 AM    TRIGLYCERIDE 53 2022 06:42 AM       Lab Results   Component Value Date/Time    SODIUM 132 (L) 2022 06:42 AM    POTASSIUM 4.0 2022 06:42 AM    CHLORIDE 97 2022 06:42 AM    CO2 26 2022 06:42 AM    GLUCOSE 82 2022 06:42 AM    BUN 10 2022 06:42 AM    CREATININE 0.42 (L) 2022 06:42 AM    CREATININE 0.6 2007 09:18 AM     Lab Results "   Component Value Date/Time    ALKPHOSPHAT 66 08/04/2022 06:42 AM    ASTSGOT 28 08/04/2022 06:42 AM    ALTSGPT 19 08/04/2022 06:42 AM    TBILIRUBIN 1.0 08/04/2022 06:42 AM      Lab Results   Component Value Date/Time    HBA1C 5.1 06/11/2021 02:58 PM    HBA1C 5.2 10/24/2020 09:37 AM     No results found for: TSH  No results found for: FREET4    Lab Results   Component Value Date/Time    WBC 3.4 (L) 08/04/2022 06:42 AM    RBC 4.27 08/04/2022 06:42 AM    HEMOGLOBIN 13.4 08/04/2022 06:42 AM    HEMATOCRIT 39.7 08/04/2022 06:42 AM    MCV 93.0 08/04/2022 06:42 AM    MCH 31.4 08/04/2022 06:42 AM    MCHC 33.8 08/04/2022 06:42 AM    MPV 9.4 08/04/2022 06:42 AM    NEUTSPOLYS 51.00 08/04/2022 06:42 AM    LYMPHOCYTES 34.70 08/04/2022 06:42 AM    MONOCYTES 10.80 08/04/2022 06:42 AM    EOSINOPHILS 1.70 08/04/2022 06:42 AM    BASOPHILS 1.50 08/04/2022 06:42 AM    ANISOCYTOSIS 1+ 05/05/2016 04:10 AM        Imaging     None    Assessment and Plan     Alysa Sarabia is a 72 y.o. female    1. Essential hypertension  - Controlled, continue with current management.  - losartan (COZAAR) 100 MG Tab; Take 1 Tablet by mouth every day.  Dispense: 100 Tablet; Refill: 4  - amLODIPine (NORVASC) 2.5 MG Tab; Take 1 Tablet by mouth at bedtime for 360 days.  Dispense: 90 Tablet; Refill: 4  - Comp Metabolic Panel; Future    2. Hyponatremia  - Controlled, continue with current management.  - Comp Metabolic Panel; Future    3. Hypercholesterolemia  - Controlled, continue with current management.  - Comp Metabolic Panel; Future  - Lipid Profile; Future    4. Neutropenia, unspecified type (HCC)   Borderline, f/u labs  - CBC WITH DIFFERENTIAL; Future    5. Encounter for screening mammogram for malignant neoplasm of breast   - du in 12/22  - MA-SCREENING MAMMO BILAT W/TOMOSYNTHESIS W/CAD; Future      Followup: in 6 months, annual and labs    All questions are answered.    Please note that this dictation was created using voice recognition  software, and that there might be errors of ashok and possibly content.

## 2022-08-15 DIAGNOSIS — M81.0 AGE-RELATED OSTEOPOROSIS WITHOUT CURRENT PATHOLOGICAL FRACTURE: ICD-10-CM

## 2022-09-01 ENCOUNTER — OUTPATIENT INFUSION SERVICES (OUTPATIENT)
Dept: ONCOLOGY | Facility: MEDICAL CENTER | Age: 72
End: 2022-09-01
Attending: FAMILY MEDICINE
Payer: MEDICARE

## 2022-09-01 VITALS
WEIGHT: 114.2 LBS | RESPIRATION RATE: 18 BRPM | HEIGHT: 59 IN | DIASTOLIC BLOOD PRESSURE: 85 MMHG | SYSTOLIC BLOOD PRESSURE: 139 MMHG | BODY MASS INDEX: 23.02 KG/M2 | HEART RATE: 69 BPM | TEMPERATURE: 97.1 F | OXYGEN SATURATION: 97 %

## 2022-09-01 DIAGNOSIS — M81.0 AGE-RELATED OSTEOPOROSIS WITHOUT CURRENT PATHOLOGICAL FRACTURE: ICD-10-CM

## 2022-09-01 LAB
CA-I BLD ISE-SCNC: 1.13 MMOL/L (ref 1.1–1.3)
CREAT BLD-MCNC: 0.5 MG/DL (ref 0.5–1.4)

## 2022-09-01 PROCEDURE — 700111 HCHG RX REV CODE 636 W/ 250 OVERRIDE (IP): Performed by: FAMILY MEDICINE

## 2022-09-01 PROCEDURE — 96372 THER/PROPH/DIAG INJ SC/IM: CPT

## 2022-09-01 PROCEDURE — 36415 COLL VENOUS BLD VENIPUNCTURE: CPT

## 2022-09-01 PROCEDURE — 82330 ASSAY OF CALCIUM: CPT

## 2022-09-01 PROCEDURE — 82565 ASSAY OF CREATININE: CPT

## 2022-09-01 RX ADMIN — DENOSUMAB 60 MG: 60 INJECTION SUBCUTANEOUS at 17:10

## 2022-09-01 ASSESSMENT — FIBROSIS 4 INDEX: FIB4 SCORE: 1.284728109675145889

## 2022-09-01 NOTE — PROGRESS NOTES
Pt arrived to [facility] ambulatory and accompanied by her . Pt denies recent illness or infections. She states she has had no invasive dental work in the last 4 weeks. Gave pt education on Prolia, discussed side effects and answered pt questions. Pt verified that she is taking calcium and vitamin D supplements at home and no signs of hypocalcemia reported.     Lab work drawn via butterfly needle from L AC without issues. Covered site with gauze and coban, no bleeding observed. Lab work meets parameters for Prolia. Injection given in back of L arm without issues. Covered site with band aid. Pt stayed for 15 minutes of observations since this is first time getting, she had no complications. Discussed when her next appointment will be and to reach out to her doctors office with any concerns. Pt verbalized understanding and left infusion center in stable condition.

## 2022-10-21 ENCOUNTER — TELEPHONE (OUTPATIENT)
Dept: MEDICAL GROUP | Age: 72
End: 2022-10-21
Payer: MEDICARE

## 2022-10-21 DIAGNOSIS — E55.9 VITAMIN D DEFICIENCY: ICD-10-CM

## 2022-10-21 DIAGNOSIS — E78.00 PURE HYPERCHOLESTEROLEMIA: ICD-10-CM

## 2022-10-21 DIAGNOSIS — R53.82 CHRONIC FATIGUE: ICD-10-CM

## 2022-10-31 ENCOUNTER — DOCUMENTATION (OUTPATIENT)
Dept: HEALTH INFORMATION MANAGEMENT | Facility: OTHER | Age: 72
End: 2022-10-31
Payer: MEDICARE

## 2022-11-03 ENCOUNTER — TELEPHONE (OUTPATIENT)
Dept: SCHEDULING | Facility: IMAGING CENTER | Age: 72
End: 2022-11-03
Payer: MEDICARE

## 2022-11-14 ENCOUNTER — HOSPITAL ENCOUNTER (OUTPATIENT)
Dept: LAB | Facility: MEDICAL CENTER | Age: 72
End: 2022-11-14
Attending: NURSE PRACTITIONER
Payer: MEDICARE

## 2022-11-14 LAB
25(OH)D3 SERPL-MCNC: 41 NG/ML (ref 30–100)
ALBUMIN SERPL BCP-MCNC: 4.3 G/DL (ref 3.2–4.9)
ALBUMIN/GLOB SERPL: 1.5 G/DL
ALP SERPL-CCNC: 64 U/L (ref 30–99)
ALT SERPL-CCNC: 18 U/L (ref 2–50)
ANION GAP SERPL CALC-SCNC: 8 MMOL/L (ref 7–16)
AST SERPL-CCNC: 23 U/L (ref 12–45)
BILIRUB SERPL-MCNC: 0.7 MG/DL (ref 0.1–1.5)
BUN SERPL-MCNC: 12 MG/DL (ref 8–22)
CALCIUM SERPL-MCNC: 9.3 MG/DL (ref 8.5–10.5)
CHLORIDE SERPL-SCNC: 95 MMOL/L (ref 96–112)
CO2 SERPL-SCNC: 28 MMOL/L (ref 20–33)
CREAT SERPL-MCNC: 0.43 MG/DL (ref 0.5–1.4)
GFR SERPLBLD CREATININE-BSD FMLA CKD-EPI: 104 ML/MIN/1.73 M 2
GLOBULIN SER CALC-MCNC: 2.8 G/DL (ref 1.9–3.5)
GLUCOSE SERPL-MCNC: 89 MG/DL (ref 65–99)
MAGNESIUM SERPL-MCNC: 2.1 MG/DL (ref 1.5–2.5)
PHOSPHATE SERPL-MCNC: 2.7 MG/DL (ref 2.5–4.5)
POTASSIUM SERPL-SCNC: 4 MMOL/L (ref 3.6–5.5)
PROT SERPL-MCNC: 7.1 G/DL (ref 6–8.2)
PTH-INTACT SERPL-MCNC: 60 PG/ML (ref 14–72)
SODIUM SERPL-SCNC: 131 MMOL/L (ref 135–145)
TSH SERPL DL<=0.005 MIU/L-ACNC: 4.14 UIU/ML (ref 0.38–5.33)

## 2022-11-14 PROCEDURE — 82306 VITAMIN D 25 HYDROXY: CPT

## 2022-11-14 PROCEDURE — 83970 ASSAY OF PARATHORMONE: CPT

## 2022-11-14 PROCEDURE — 84100 ASSAY OF PHOSPHORUS: CPT

## 2022-11-14 PROCEDURE — 36415 COLL VENOUS BLD VENIPUNCTURE: CPT

## 2022-11-14 PROCEDURE — 84443 ASSAY THYROID STIM HORMONE: CPT

## 2022-11-14 PROCEDURE — 83735 ASSAY OF MAGNESIUM: CPT

## 2022-11-14 PROCEDURE — 80053 COMPREHEN METABOLIC PANEL: CPT

## 2023-01-23 ENCOUNTER — HOSPITAL ENCOUNTER (OUTPATIENT)
Dept: RADIOLOGY | Facility: MEDICAL CENTER | Age: 73
End: 2023-01-23
Attending: INTERNAL MEDICINE
Payer: MEDICARE

## 2023-01-23 DIAGNOSIS — Z12.31 ENCOUNTER FOR SCREENING MAMMOGRAM FOR MALIGNANT NEOPLASM OF BREAST: ICD-10-CM

## 2023-01-23 PROCEDURE — 77063 BREAST TOMOSYNTHESIS BI: CPT

## 2023-01-26 ENCOUNTER — TELEPHONE (OUTPATIENT)
Dept: ONCOLOGY | Facility: MEDICAL CENTER | Age: 73
End: 2023-01-26
Payer: MEDICARE

## 2023-01-26 NOTE — TELEPHONE ENCOUNTER
Received call from patient to check to see if we had received orders from [facility] office. I checked for orders in her epic chart and in rightfax but we haven't received orders. She states it is hard to reach anyone at their office as it take thirty minutes or more to speak to some IF you get to speak to anyone. I told her I would send a formal faxed request for the order to be sent to us.    I faxed a request to [phone] to request orders.

## 2023-02-03 ENCOUNTER — HOSPITAL ENCOUNTER (OUTPATIENT)
Dept: LAB | Facility: MEDICAL CENTER | Age: 73
End: 2023-02-03
Attending: FAMILY MEDICINE
Payer: MEDICARE

## 2023-02-03 DIAGNOSIS — R53.82 CHRONIC FATIGUE: ICD-10-CM

## 2023-02-03 DIAGNOSIS — E78.00 PURE HYPERCHOLESTEROLEMIA: ICD-10-CM

## 2023-02-03 DIAGNOSIS — E55.9 VITAMIN D DEFICIENCY: ICD-10-CM

## 2023-02-03 LAB
25(OH)D3 SERPL-MCNC: 50 NG/ML (ref 30–100)
ALBUMIN SERPL BCP-MCNC: 4.3 G/DL (ref 3.2–4.9)
ALBUMIN/GLOB SERPL: 1.5 G/DL
ALP SERPL-CCNC: 59 U/L (ref 30–99)
ALT SERPL-CCNC: 18 U/L (ref 2–50)
ANION GAP SERPL CALC-SCNC: 9 MMOL/L (ref 7–16)
AST SERPL-CCNC: 24 U/L (ref 12–45)
BASOPHILS # BLD AUTO: 1 % (ref 0–1.8)
BASOPHILS # BLD: 0.04 K/UL (ref 0–0.12)
BILIRUB SERPL-MCNC: 1 MG/DL (ref 0.1–1.5)
BUN SERPL-MCNC: 14 MG/DL (ref 8–22)
CALCIUM ALBUM COR SERPL-MCNC: 8.9 MG/DL (ref 8.5–10.5)
CALCIUM SERPL-MCNC: 9.1 MG/DL (ref 8.5–10.5)
CHLORIDE SERPL-SCNC: 96 MMOL/L (ref 96–112)
CHOLEST SERPL-MCNC: 208 MG/DL (ref 100–199)
CO2 SERPL-SCNC: 27 MMOL/L (ref 20–33)
CREAT SERPL-MCNC: 0.38 MG/DL (ref 0.5–1.4)
EOSINOPHIL # BLD AUTO: 0.05 K/UL (ref 0–0.51)
EOSINOPHIL NFR BLD: 1.3 % (ref 0–6.9)
ERYTHROCYTE [DISTWIDTH] IN BLOOD BY AUTOMATED COUNT: 42.1 FL (ref 35.9–50)
FASTING STATUS PATIENT QL REPORTED: NORMAL
GFR SERPLBLD CREATININE-BSD FMLA CKD-EPI: 107 ML/MIN/1.73 M 2
GLOBULIN SER CALC-MCNC: 2.8 G/DL (ref 1.9–3.5)
GLUCOSE SERPL-MCNC: 94 MG/DL (ref 65–99)
HCT VFR BLD AUTO: 39.7 % (ref 37–47)
HDLC SERPL-MCNC: 77 MG/DL
HGB BLD-MCNC: 14.1 G/DL (ref 12–16)
IMM GRANULOCYTES # BLD AUTO: 0.02 K/UL (ref 0–0.11)
IMM GRANULOCYTES NFR BLD AUTO: 0.5 % (ref 0–0.9)
LDLC SERPL CALC-MCNC: 117 MG/DL
LYMPHOCYTES # BLD AUTO: 1.22 K/UL (ref 1–4.8)
LYMPHOCYTES NFR BLD: 31.3 % (ref 22–41)
MCH RBC QN AUTO: 33 PG (ref 27–33)
MCHC RBC AUTO-ENTMCNC: 35.5 G/DL (ref 33.6–35)
MCV RBC AUTO: 93 FL (ref 81.4–97.8)
MONOCYTES # BLD AUTO: 0.36 K/UL (ref 0–0.85)
MONOCYTES NFR BLD AUTO: 9.2 % (ref 0–13.4)
NEUTROPHILS # BLD AUTO: 2.21 K/UL (ref 2–7.15)
NEUTROPHILS NFR BLD: 56.7 % (ref 44–72)
NRBC # BLD AUTO: 0 K/UL
NRBC BLD-RTO: 0 /100 WBC
PLATELET # BLD AUTO: 358 K/UL (ref 164–446)
PMV BLD AUTO: 8.8 FL (ref 9–12.9)
POTASSIUM SERPL-SCNC: 3.9 MMOL/L (ref 3.6–5.5)
PROT SERPL-MCNC: 7.1 G/DL (ref 6–8.2)
RBC # BLD AUTO: 4.27 M/UL (ref 4.2–5.4)
SODIUM SERPL-SCNC: 132 MMOL/L (ref 135–145)
T3FREE SERPL-MCNC: 2.92 PG/ML (ref 2–4.4)
TRIGL SERPL-MCNC: 71 MG/DL (ref 0–149)
WBC # BLD AUTO: 3.9 K/UL (ref 4.8–10.8)

## 2023-02-03 PROCEDURE — 80053 COMPREHEN METABOLIC PANEL: CPT

## 2023-02-03 PROCEDURE — 36415 COLL VENOUS BLD VENIPUNCTURE: CPT

## 2023-02-03 PROCEDURE — 82306 VITAMIN D 25 HYDROXY: CPT

## 2023-02-03 PROCEDURE — 85025 COMPLETE CBC W/AUTO DIFF WBC: CPT

## 2023-02-03 PROCEDURE — 84481 FREE ASSAY (FT-3): CPT

## 2023-02-03 PROCEDURE — 80061 LIPID PANEL: CPT

## 2023-02-08 ENCOUNTER — OFFICE VISIT (OUTPATIENT)
Dept: MEDICAL GROUP | Age: 73
End: 2023-02-08
Payer: MEDICARE

## 2023-02-08 VITALS
RESPIRATION RATE: 16 BRPM | HEART RATE: 75 BPM | TEMPERATURE: 98.8 F | DIASTOLIC BLOOD PRESSURE: 80 MMHG | BODY MASS INDEX: 21.75 KG/M2 | WEIGHT: 118.2 LBS | HEIGHT: 62 IN | SYSTOLIC BLOOD PRESSURE: 125 MMHG | OXYGEN SATURATION: 100 %

## 2023-02-08 DIAGNOSIS — E78.00 HYPERCHOLESTEROLEMIA: ICD-10-CM

## 2023-02-08 DIAGNOSIS — Z23 NEED FOR VACCINATION: ICD-10-CM

## 2023-02-08 DIAGNOSIS — Z00.00 ENCOUNTER FOR MEDICAL EXAMINATION TO ESTABLISH CARE: ICD-10-CM

## 2023-02-08 DIAGNOSIS — I10 ESSENTIAL HYPERTENSION: ICD-10-CM

## 2023-02-08 DIAGNOSIS — R53.82 CHRONIC FATIGUE: ICD-10-CM

## 2023-02-08 DIAGNOSIS — D70.9 NEUTROPENIA, UNSPECIFIED TYPE (HCC): ICD-10-CM

## 2023-02-08 PROCEDURE — 99214 OFFICE O/P EST MOD 30 MIN: CPT | Mod: 25 | Performed by: FAMILY MEDICINE

## 2023-02-08 PROCEDURE — G0009 ADMIN PNEUMOCOCCAL VACCINE: HCPCS | Performed by: FAMILY MEDICINE

## 2023-02-08 PROCEDURE — 90677 PCV20 VACCINE IM: CPT | Performed by: FAMILY MEDICINE

## 2023-02-08 ASSESSMENT — PATIENT HEALTH QUESTIONNAIRE - PHQ9: CLINICAL INTERPRETATION OF PHQ2 SCORE: 0

## 2023-02-08 ASSESSMENT — FIBROSIS 4 INDEX: FIB4 SCORE: 1.11

## 2023-02-08 NOTE — PROGRESS NOTES
This medical record contains text that has been entered with the assistance of computer voice recognition and dictation software.  Therefore, it may contain unintended errors in text, spelling, punctuation, or grammar      Chief Complaint   Patient presents with    Follow-Up     6 month follow up. New PT         Alysa Sarabia is a 72 y.o. female here evaluation and management of: establish care      HPI:     HCC Gap Form    Diagnosis to address: D70.9 - Neutropenia, unspecified type (HCC)  Assessment and plan: Chronic, stable. Continue with current defined treatment plan: . Follow-up at least annually.  Last edited 02/08/23 15:31 PST by Zac Escobar M.D.           1. Encounter for medical examination to establish care    The patient is a very pleasant 72-year-old female who presents to clinic with her  to establish care.  She has a significant past medical history of hypertension, osteoporosis.    Today the patient denied any fevers, chills, headaches, nausea, vomiting, changes in vision, shortness of breath, back pain, chest pain, nausea or vomiting, change in taste or smell, new rashes, joint pain, blood in stool dark tarry stool.      Past medical history see above and below    Family History of Cancer---none    Family History of early CAD (female for the age of 65, or a male before the age of 55 )---none      Social History        Occupation----retired teacher high school    Exercise---walks 3-5 miles 5 days when good weather, weights    Colonoscopy---UTD    Pets---none    Favorite sports team--- none          2. Essential hypertension  Amlodipine 2.5 mg p.o. daily  Losartan 100 mg p.o. daily        Overall the patient has been compliant with his medical regimen, denies any adverse side effects such as cough, no syncope, no presyncope, no excessive fatigue.  The patient denies any chest pain today no headaches.      3. Need for vaccination  Due for PCV 20      Current medicines (including  changes today)  Current Outpatient Medications   Medication Sig Dispense Refill    denosumab (PROLIA) 60 MG/ML Solution Prefilled Syringe injection Inject 1 mL under the skin every 6 months. 1 mL 1    losartan (COZAAR) 100 MG Tab Take 1 Tablet by mouth every day. 100 Tablet 4    amLODIPine (NORVASC) 2.5 MG Tab Take 1 Tablet by mouth at bedtime for 360 days. 90 Tablet 4    clotrimazole-betamethasone (LOTRISONE) 1-0.05 % Cream Apply 1 g topically.      LUTEIN PO Take  by mouth.      Multiple Vitamins-Minerals (PRESERVISION AREDS PO) Take 1 tablet by mouth 2 times a day.      Cholecalciferol (VITAMIN D3) 125 MCG (5000 UT) Tab Take 1 capsule by mouth every day.      CALCIUM PO Take 1 tablet by mouth every day.       No current facility-administered medications for this visit.     She  has a past medical history of Hydronephrosis, Hypertension, Hyponatremia, Leukopenia, Liver cyst, Small bowel obstruction (HCC), and Varicose vein of leg.  She  has a past surgical history that includes breast biopsy (2007); colon resection laparoscopic (3/27/2014); exploratory laparotomy (5/3/2016); cystoscopy stent placement (Right, 5/13/2016); retrogrades (5/13/2016); exploratory laparotomy (12/14/2016); and lumpectomy (Left, 2005).  Social History     Tobacco Use    Smoking status: Never    Smokeless tobacco: Never   Vaping Use    Vaping Use: Never used   Substance Use Topics    Alcohol use: Yes     Alcohol/week: 1.8 oz     Types: 3 Glasses of wine per week     Comment: 3 a week    Drug use: No     Social History     Social History Narrative    Lives with .     Children: 1    Work: teacher in [employer]     Family History   Problem Relation Age of Onset    Hypertension Mother     Heart Disease Father     Cancer Brother         neck    Cancer Maternal Grandmother     No Known Problems Maternal Grandfather     No Known Problems Paternal Grandmother     No Known Problems Paternal Grandfather      "Alcohol/Drug Neg Hx     Allergies Neg Hx     Diabetes Neg Hx     Psychiatric Illness Neg Hx     Stroke Neg Hx     Thyroid Neg Hx     Kidney Disease Neg Hx      Family Status   Relation Name Status    Mo  Alive    Fa  Alive    Bro  Alive    MGMo      MGFa      PGMo      PGFa      Neg Hx  (Not Specified)         ROS    The pertinent  ROS findings can be seen in the HPI above.     All other systems reviewed and are negative     Objective:     /80 (BP Location: Right arm, Patient Position: Sitting, BP Cuff Size: Adult)   Pulse 75   Temp 37.1 °C (98.8 °F) (Temporal)   Resp 16   Ht 1.575 m (5' 2\")   Wt 53.6 kg (118 lb 3.2 oz)   SpO2 100%  Body mass index is 21.62 kg/m².      Physical Exam:    Constitutional: Alert, no distress.  Skin: No suspicious lesions  Eye: Equal, round and reactive, conjunctiva clear, lids normal.  ENMT: Lips without lesions, good dentition, oropharynx clear.  Neck: Trachea midline, no masses, no thyromegaly. No cervical or supraclavicular lymphadenopathy.  Respiratory: Unlabored respiratory effort, lungs clear to auscultation, no wheezes, no ronchi.  Cardiovascular: Normal S1, S2, no murmur, no edema  Abdomen: Soft, non-tender, no masses, no hepatosplenomegaly.        Assessment and Plan:   The following treatment plan was discussed    All recent labs and provider notes reviewed    1. Encounter for medical examination to establish care    Care has been established  We need  updated l abs to establish a clinical profile      Age-appropriate preventive services recommended by USPTF guidelines and ACIP were discussed today.        Requested any outside Medical records to be sent to us  Denies intimate partner viloence  Discussed seat belt safety    2. Essential hypertension    Patient has been stable with current management  We will make no changes for now    3. Need for vaccination  Vaccine was administered today without adverse event.    - Pneumococcal " Conjugate Vaccine 20-Valent (19 yrs+)    4. Chronic fatigue  - TSH+FREE T4  - T3 FREE; Future    5. Hypercholesterolemia  - Comp Metabolic Panel; Future  - CBC WITH DIFFERENTIAL; Future  - Lipid Profile; Future          Instructed to Follow up in clinic or ER for worsening symptoms, difficulty breathing, lack of expected recovery, or should new symptoms or problems arise.    Followup: Return in about 4 weeks (around 3/8/2023) for Reevaluation.

## 2023-03-02 ENCOUNTER — OUTPATIENT INFUSION SERVICES (OUTPATIENT)
Dept: ONCOLOGY | Facility: MEDICAL CENTER | Age: 73
End: 2023-03-02
Attending: FAMILY MEDICINE
Payer: MEDICARE

## 2023-03-02 VITALS
HEART RATE: 72 BPM | WEIGHT: 116.84 LBS | DIASTOLIC BLOOD PRESSURE: 84 MMHG | BODY MASS INDEX: 22.06 KG/M2 | OXYGEN SATURATION: 98 % | SYSTOLIC BLOOD PRESSURE: 135 MMHG | TEMPERATURE: 97 F | HEIGHT: 61 IN | RESPIRATION RATE: 18 BRPM

## 2023-03-02 DIAGNOSIS — M81.0 AGE-RELATED OSTEOPOROSIS WITHOUT CURRENT PATHOLOGICAL FRACTURE: ICD-10-CM

## 2023-03-02 LAB
CA-I BLD ISE-SCNC: 1.19 MMOL/L (ref 1.1–1.3)
CREAT BLD-MCNC: 0.5 MG/DL (ref 0.5–1.4)

## 2023-03-02 PROCEDURE — 700111 HCHG RX REV CODE 636 W/ 250 OVERRIDE (IP): Performed by: FAMILY MEDICINE

## 2023-03-02 PROCEDURE — 96372 THER/PROPH/DIAG INJ SC/IM: CPT

## 2023-03-02 PROCEDURE — 36415 COLL VENOUS BLD VENIPUNCTURE: CPT

## 2023-03-02 PROCEDURE — 82565 ASSAY OF CREATININE: CPT

## 2023-03-02 PROCEDURE — 82330 ASSAY OF CALCIUM: CPT

## 2023-03-02 RX ADMIN — DENOSUMAB 60 MG: 60 INJECTION SUBCUTANEOUS at 15:34

## 2023-03-02 ASSESSMENT — FIBROSIS 4 INDEX: FIB4 SCORE: 1.11

## 2023-03-02 NOTE — PROGRESS NOTES
Alysa to infusion for Prolia injection. Denies recent dental work or s/s hypocalcemia, reports she is taking Ca and vit D. Labs drawn peripherally and reviewed by RN. Prolia given as ordered in L upper arm, patient tolerated well with no adverse effects. Patient left in stable condition, has next appt.

## 2023-03-13 ENCOUNTER — HOSPITAL ENCOUNTER (OUTPATIENT)
Dept: LAB | Facility: MEDICAL CENTER | Age: 73
End: 2023-03-13
Attending: FAMILY MEDICINE
Payer: MEDICARE

## 2023-03-13 DIAGNOSIS — E78.00 HYPERCHOLESTEROLEMIA: ICD-10-CM

## 2023-03-13 DIAGNOSIS — R53.82 CHRONIC FATIGUE: ICD-10-CM

## 2023-03-13 LAB
T3FREE SERPL-MCNC: 2.64 PG/ML (ref 2–4.4)
T4 FREE SERPL-MCNC: 1.09 NG/DL (ref 0.93–1.7)
TSH SERPL DL<=0.005 MIU/L-ACNC: 2.81 UIU/ML (ref 0.38–5.33)

## 2023-03-13 PROCEDURE — 36415 COLL VENOUS BLD VENIPUNCTURE: CPT

## 2023-03-13 PROCEDURE — 84439 ASSAY OF FREE THYROXINE: CPT

## 2023-03-13 PROCEDURE — 84481 FREE ASSAY (FT-3): CPT

## 2023-03-13 PROCEDURE — 84443 ASSAY THYROID STIM HORMONE: CPT

## 2023-04-24 ENCOUNTER — APPOINTMENT (OUTPATIENT)
Dept: RADIOLOGY | Facility: MEDICAL CENTER | Age: 73
End: 2023-04-24
Attending: NURSE PRACTITIONER
Payer: MEDICARE

## 2023-05-02 ENCOUNTER — HOSPITAL ENCOUNTER (OUTPATIENT)
Dept: RADIOLOGY | Facility: MEDICAL CENTER | Age: 73
End: 2023-05-02
Attending: NURSE PRACTITIONER
Payer: MEDICARE

## 2023-05-02 DIAGNOSIS — M54.50 LOW BACK PAIN, UNSPECIFIED BACK PAIN LATERALITY, UNSPECIFIED CHRONICITY, UNSPECIFIED WHETHER SCIATICA PRESENT: ICD-10-CM

## 2023-05-02 DIAGNOSIS — M25.562 LEFT KNEE PAIN, UNSPECIFIED CHRONICITY: ICD-10-CM

## 2023-05-02 PROCEDURE — 73564 X-RAY EXAM KNEE 4 OR MORE: CPT | Mod: LT

## 2023-05-02 PROCEDURE — 72110 X-RAY EXAM L-2 SPINE 4/>VWS: CPT

## 2023-06-14 ENCOUNTER — TELEPHONE (OUTPATIENT)
Dept: HEALTH INFORMATION MANAGEMENT | Facility: OTHER | Age: 73
End: 2023-06-14
Payer: MEDICARE

## 2023-07-18 PROBLEM — D72.819 LEUKOPENIA: Status: ACTIVE | Noted: 2023-07-18

## 2023-07-18 PROBLEM — G31.9 CEREBRAL ATROPHY, MILD (HCC): Status: ACTIVE | Noted: 2023-07-18

## 2023-07-18 PROBLEM — I70.0 AORTIC ATHEROSCLEROSIS (HCC): Status: ACTIVE | Noted: 2023-07-18

## 2023-07-21 ENCOUNTER — HOSPITAL ENCOUNTER (OUTPATIENT)
Dept: LAB | Facility: MEDICAL CENTER | Age: 73
End: 2023-07-21
Attending: FAMILY MEDICINE
Payer: MEDICARE

## 2023-07-21 LAB
ALBUMIN SERPL BCP-MCNC: 4 G/DL (ref 3.2–4.9)
ALBUMIN/GLOB SERPL: 1.2 G/DL
ALP SERPL-CCNC: 61 U/L (ref 30–99)
ALT SERPL-CCNC: 21 U/L (ref 2–50)
ANION GAP SERPL CALC-SCNC: 9 MMOL/L (ref 7–16)
AST SERPL-CCNC: 28 U/L (ref 12–45)
BASOPHILS # BLD AUTO: 1.3 % (ref 0–1.8)
BASOPHILS # BLD: 0.05 K/UL (ref 0–0.12)
BILIRUB SERPL-MCNC: 0.6 MG/DL (ref 0.1–1.5)
BUN SERPL-MCNC: 13 MG/DL (ref 8–22)
CALCIUM ALBUM COR SERPL-MCNC: 9.4 MG/DL (ref 8.5–10.5)
CALCIUM SERPL-MCNC: 9.4 MG/DL (ref 8.5–10.5)
CHLORIDE SERPL-SCNC: 97 MMOL/L (ref 96–112)
CHOLEST SERPL-MCNC: 201 MG/DL (ref 100–199)
CO2 SERPL-SCNC: 28 MMOL/L (ref 20–33)
CREAT SERPL-MCNC: 0.44 MG/DL (ref 0.5–1.4)
EOSINOPHIL # BLD AUTO: 0.09 K/UL (ref 0–0.51)
EOSINOPHIL NFR BLD: 2.4 % (ref 0–6.9)
ERYTHROCYTE [DISTWIDTH] IN BLOOD BY AUTOMATED COUNT: 43.3 FL (ref 35.9–50)
FASTING STATUS PATIENT QL REPORTED: NORMAL
GFR SERPLBLD CREATININE-BSD FMLA CKD-EPI: 103 ML/MIN/1.73 M 2
GLOBULIN SER CALC-MCNC: 3.3 G/DL (ref 1.9–3.5)
GLUCOSE SERPL-MCNC: 91 MG/DL (ref 65–99)
HCT VFR BLD AUTO: 39.7 % (ref 37–47)
HDLC SERPL-MCNC: 71 MG/DL
HGB BLD-MCNC: 13 G/DL (ref 12–16)
IMM GRANULOCYTES # BLD AUTO: 0.01 K/UL (ref 0–0.11)
IMM GRANULOCYTES NFR BLD AUTO: 0.3 % (ref 0–0.9)
LDLC SERPL CALC-MCNC: 115 MG/DL
LYMPHOCYTES # BLD AUTO: 1.57 K/UL (ref 1–4.8)
LYMPHOCYTES NFR BLD: 42.1 % (ref 22–41)
MCH RBC QN AUTO: 30.6 PG (ref 27–33)
MCHC RBC AUTO-ENTMCNC: 32.7 G/DL (ref 32.2–35.5)
MCV RBC AUTO: 93.4 FL (ref 81.4–97.8)
MONOCYTES # BLD AUTO: 0.39 K/UL (ref 0–0.85)
MONOCYTES NFR BLD AUTO: 10.5 % (ref 0–13.4)
NEUTROPHILS # BLD AUTO: 1.62 K/UL (ref 1.82–7.42)
NEUTROPHILS NFR BLD: 43.4 % (ref 44–72)
NRBC # BLD AUTO: 0 K/UL
NRBC BLD-RTO: 0 /100 WBC (ref 0–0.2)
PLATELET # BLD AUTO: 390 K/UL (ref 164–446)
PMV BLD AUTO: 9.2 FL (ref 9–12.9)
POTASSIUM SERPL-SCNC: 4.4 MMOL/L (ref 3.6–5.5)
PROT SERPL-MCNC: 7.3 G/DL (ref 6–8.2)
RBC # BLD AUTO: 4.25 M/UL (ref 4.2–5.4)
SODIUM SERPL-SCNC: 134 MMOL/L (ref 135–145)
TRIGL SERPL-MCNC: 77 MG/DL (ref 0–149)
WBC # BLD AUTO: 3.7 K/UL (ref 4.8–10.8)

## 2023-07-21 PROCEDURE — 85025 COMPLETE CBC W/AUTO DIFF WBC: CPT

## 2023-07-21 PROCEDURE — 36415 COLL VENOUS BLD VENIPUNCTURE: CPT

## 2023-07-21 PROCEDURE — 80061 LIPID PANEL: CPT

## 2023-07-21 PROCEDURE — 80053 COMPREHEN METABOLIC PANEL: CPT

## 2023-07-26 ENCOUNTER — TELEPHONE (OUTPATIENT)
Dept: MEDICAL GROUP | Age: 73
End: 2023-07-26

## 2023-07-26 ENCOUNTER — OFFICE VISIT (OUTPATIENT)
Dept: MEDICAL GROUP | Age: 73
End: 2023-07-26
Payer: MEDICARE

## 2023-07-26 VITALS
HEIGHT: 60 IN | HEART RATE: 64 BPM | WEIGHT: 115 LBS | OXYGEN SATURATION: 98 % | SYSTOLIC BLOOD PRESSURE: 130 MMHG | DIASTOLIC BLOOD PRESSURE: 80 MMHG | TEMPERATURE: 98.3 F | BODY MASS INDEX: 22.58 KG/M2

## 2023-07-26 DIAGNOSIS — N40.0 BENIGN PROSTATIC HYPERPLASIA WITHOUT LOWER URINARY TRACT SYMPTOMS: ICD-10-CM

## 2023-07-26 DIAGNOSIS — E78.00 HYPERCHOLESTEROLEMIA: ICD-10-CM

## 2023-07-26 DIAGNOSIS — E78.00 PURE HYPERCHOLESTEROLEMIA: ICD-10-CM

## 2023-07-26 DIAGNOSIS — I10 ESSENTIAL HYPERTENSION: ICD-10-CM

## 2023-07-26 PROCEDURE — 3075F SYST BP GE 130 - 139MM HG: CPT | Performed by: FAMILY MEDICINE

## 2023-07-26 PROCEDURE — 3079F DIAST BP 80-89 MM HG: CPT | Performed by: FAMILY MEDICINE

## 2023-07-26 PROCEDURE — 99214 OFFICE O/P EST MOD 30 MIN: CPT | Performed by: FAMILY MEDICINE

## 2023-07-26 ASSESSMENT — FIBROSIS 4 INDEX: FIB4 SCORE: 1.11

## 2023-07-26 NOTE — TELEPHONE ENCOUNTER
Patient and patient's spouse checked out of their appointment for today. They said that usually they get labs ordered every 6 mths. I did not see any ordered.    Did you want labs for both patients before their next visit? If so, they need to be ordered.    Thank you

## 2023-07-26 NOTE — PROGRESS NOTES
This medical record contains text that has been entered with the assistance of computer voice recognition and dictation software.  Therefore, it may contain unintended errors in text, spelling, punctuation, or grammar      Chief Complaint   Patient presents with    Lab Results    Follow-Up     6 months          Alysa Sarabia is a 73 y.o. female here evaluation and management of: 6-month follow-up      HPI:           1. Essential hypertension  Losartan 100 mg p.o. daily  Amlodipine 2.5 mg p.o. daily      Overall the patient has been compliant with his medical regimen, denies any adverse side effects such as cough, no syncope, no presyncope, no excessive fatigue.  The patient denies any chest pain today no headaches.      2. Hypercholesterolemia  Patient has been using lifestyle modification to address this.     Latest Reference Range & Units 07/21/23 06:36   Cholesterol,Tot 100 - 199 mg/dL 201 (H)   Triglycerides 0 - 149 mg/dL 77   HDL >=40 mg/dL 71   LDL <100 mg/dL 115 (H)   (H): Data is abnormally high   Latest Reference Range & Units 03/13/23 12:37   TSH 0.380 - 5.330 uIU/mL 2.810   Free T-4 0.93 - 1.70 ng/dL 1.09   T3,Free 2.00 - 4.40 pg/mL 2.64      Latest Reference Range & Units 07/21/23 06:36   WBC 4.8 - 10.8 K/uL 3.7 (L)   RBC 4.20 - 5.40 M/uL 4.25   Hemoglobin 12.0 - 16.0 g/dL 13.0   Hematocrit 37.0 - 47.0 % 39.7   MCV 81.4 - 97.8 fL 93.4   MCH 27.0 - 33.0 pg 30.6   MCHC 32.2 - 35.5 g/dL 32.7   RDW 35.9 - 50.0 fL 43.3   Platelet Count 164 - 446 K/uL 390   MPV 9.0 - 12.9 fL 9.2   (L): Data is abnormally low  Current medicines (including changes today)  Current Outpatient Medications   Medication Sig Dispense Refill    denosumab (PROLIA) 60 MG/ML Solution Prefilled Syringe injection Inject 1 mL under the skin every 6 months. 1 mL 1    losartan (COZAAR) 100 MG Tab Take 1 Tablet by mouth every day. 100 Tablet 4    amLODIPine (NORVASC) 2.5 MG Tab Take 1 Tablet by mouth at bedtime for 360 days. 90 Tablet  4    clotrimazole-betamethasone (LOTRISONE) 1-0.05 % Cream Apply 1 g topically.      LUTEIN PO Take  by mouth.      Multiple Vitamins-Minerals (PRESERVISION AREDS PO) Take 1 tablet by mouth 2 times a day.      Cholecalciferol (VITAMIN D3) 125 MCG (5000 UT) Tab Take 1 capsule by mouth every day.      CALCIUM PO Take 1 tablet by mouth every day.       No current facility-administered medications for this visit.     She  has a past medical history of Hydronephrosis, Hypertension, Hyponatremia, Leukopenia, Liver cyst, Small bowel obstruction (HCC), and Varicose vein of leg.  She  has a past surgical history that includes breast biopsy (); colon resection laparoscopic (3/27/2014); exploratory laparotomy (5/3/2016); cystoscopy stent placement (Right, 2016); retrogrades (2016); exploratory laparotomy (2016); and lumpectomy (Left, ).  Social History     Tobacco Use    Smoking status: Never    Smokeless tobacco: Never   Vaping Use    Vaping Use: Never used   Substance Use Topics    Alcohol use: Yes     Alcohol/week: 1.8 oz     Types: 3 Glasses of wine per week     Comment: 3 a week    Drug use: No     Social History     Social History Narrative    Lives with .     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     Family History   Problem Relation Age of Onset    Hypertension Mother     Heart Disease Father     Cancer Brother         neck    Cancer Maternal Grandmother     No Known Problems Maternal Grandfather     No Known Problems Paternal Grandmother     No Known Problems Paternal Grandfather     Alcohol/Drug Neg Hx     Allergies Neg Hx     Diabetes Neg Hx     Psychiatric Illness Neg Hx     Stroke Neg Hx     Thyroid Neg Hx     Kidney Disease Neg Hx      Family Status   Relation Name Status    Mo  Alive    Fa  Alive    Bro  Alive    MGMo      MGFa      PGMo      PGFa      Neg Hx  (Not Specified)         ROS    The pertinent  ROS findings  "can be seen in the HPI above.     All other systems reviewed and are negative     Objective:     /80 (BP Location: Right arm, Patient Position: Sitting, BP Cuff Size: Adult)   Pulse 64   Temp 36.8 °C (98.3 °F) (Temporal)   Ht 1.511 m (4' 11.5\")   Wt 52.2 kg (115 lb)   SpO2 98%  Body mass index is 22.84 kg/m².      Physical Exam:    Constitutional: Alert, no distress.  Skin: No suspicious lesions  Eye: Equal, round and reactive, conjunctiva clear, lids normal.  ENMT: Lips without lesions, good dentition, oropharynx clear.  Neck: Trachea midline, no masses, no thyromegaly. No cervical or supraclavicular lymphadenopathy.  Respiratory: Unlabored respiratory effort, lungs clear to auscultation, no wheezes, no ronchi.  Cardiovascular: Normal S1, S2, no murmur, no edema  Abdomen: Soft, non-tender, no masses, no hepatosplenomegaly.        Assessment and Plan:   The following treatment plan was discussed    All recent labs and provider notes reviewed    1. Essential hypertension    Patient has been stable with current management  We will make no changes for now    2. Hypercholesterolemia     Patient refuses to start a statin  So I recommended aggressive lifestyle modifications  She is currently walking 2 miles a day so I recommended limiting fatty and fried foods.  To eat more of a Mediterranean diet        Instructed to Follow up in clinic or ER for worsening symptoms, difficulty breathing, lack of expected recovery, or should new symptoms or problems arise.    Followup: Return in about 6 months (around 1/26/2024) for Reevaluation, labs.               "

## 2023-08-17 DIAGNOSIS — I10 ESSENTIAL HYPERTENSION: ICD-10-CM

## 2023-08-18 RX ORDER — AMLODIPINE BESYLATE 2.5 MG/1
2.5 TABLET ORAL
Qty: 100 TABLET | Refills: 2 | Status: SHIPPED | OUTPATIENT
Start: 2023-08-18 | End: 2023-08-21 | Stop reason: SDUPTHER

## 2023-08-21 DIAGNOSIS — I10 ESSENTIAL HYPERTENSION: ICD-10-CM

## 2023-08-22 DIAGNOSIS — I10 ESSENTIAL HYPERTENSION: ICD-10-CM

## 2023-08-22 RX ORDER — AMLODIPINE BESYLATE 2.5 MG/1
2.5 TABLET ORAL
Qty: 100 TABLET | Refills: 2 | Status: SHIPPED | OUTPATIENT
Start: 2023-08-22 | End: 2024-08-16

## 2023-08-22 NOTE — TELEPHONE ENCOUNTER
Received request via: Patient    Was the patient seen in the last year in this department? Yes    Does the patient have an active prescription (recently filled or refills available) for medication(s) requested? No    Does the patient have group home Plus and need 100 day supply (blood pressure, diabetes and cholesterol meds only)? Yes, quantity updated to 100 days

## 2023-08-23 RX ORDER — LOSARTAN POTASSIUM 100 MG/1
100 TABLET ORAL DAILY
Qty: 100 TABLET | Refills: 4 | Status: SHIPPED | OUTPATIENT
Start: 2023-08-23 | End: 2023-08-31 | Stop reason: SDUPTHER

## 2023-08-31 DIAGNOSIS — I10 ESSENTIAL HYPERTENSION: ICD-10-CM

## 2023-08-31 RX ORDER — LOSARTAN POTASSIUM 100 MG/1
100 TABLET ORAL DAILY
Qty: 100 TABLET | Refills: 4 | Status: SHIPPED | OUTPATIENT
Start: 2023-08-31 | End: 2023-10-05 | Stop reason: SDUPTHER

## 2023-09-04 ENCOUNTER — OUTPATIENT INFUSION SERVICES (OUTPATIENT)
Dept: ONCOLOGY | Facility: MEDICAL CENTER | Age: 73
End: 2023-09-04
Attending: FAMILY MEDICINE
Payer: MEDICARE

## 2023-09-04 VITALS
HEIGHT: 61 IN | RESPIRATION RATE: 18 BRPM | TEMPERATURE: 98 F | OXYGEN SATURATION: 98 % | HEART RATE: 77 BPM | BODY MASS INDEX: 21.6 KG/M2 | DIASTOLIC BLOOD PRESSURE: 78 MMHG | WEIGHT: 114.42 LBS | SYSTOLIC BLOOD PRESSURE: 121 MMHG

## 2023-09-04 DIAGNOSIS — M81.0 AGE-RELATED OSTEOPOROSIS WITHOUT CURRENT PATHOLOGICAL FRACTURE: ICD-10-CM

## 2023-09-04 LAB
CA-I BLD ISE-SCNC: 1.11 MMOL/L (ref 1.1–1.3)
CREAT BLD-MCNC: 0.4 MG/DL (ref 0.5–1.4)

## 2023-09-04 PROCEDURE — 82330 ASSAY OF CALCIUM: CPT

## 2023-09-04 PROCEDURE — 82565 ASSAY OF CREATININE: CPT

## 2023-09-04 PROCEDURE — 36415 COLL VENOUS BLD VENIPUNCTURE: CPT

## 2023-09-04 PROCEDURE — 700111 HCHG RX REV CODE 636 W/ 250 OVERRIDE (IP): Mod: JZ | Performed by: NURSE PRACTITIONER

## 2023-09-04 RX ADMIN — DENOSUMAB 60 MG: 60 INJECTION SUBCUTANEOUS at 11:52

## 2023-09-04 ASSESSMENT — FIBROSIS 4 INDEX: FIB4 SCORE: 1.11

## 2023-09-04 NOTE — PROGRESS NOTES
Alysa to [facility] for Prolia injection. Pt denied having any new complaints, acute infections, or dental procedures in the last month or scheduled for the next month. Butterfly needle used to draw blood from left AC. Ionized calcium/creatinine tested, WNL, pharmacist notified that Pt within parameters to treat. Alysa aware to continue taking vitamin D and calcium supplements. Prolia injection given in back of right arm SQ. Pt tolerated well, band-aid applied to injection site. Pt reports that she has a bone density scan coming up and will be inquiring with MD about future Prolia appts. Pt discharged to home in stable condition.

## 2023-10-02 ENCOUNTER — PHARMACY VISIT (OUTPATIENT)
Dept: PHARMACY | Facility: MEDICAL CENTER | Age: 73
End: 2023-10-02
Payer: COMMERCIAL

## 2023-10-02 PROCEDURE — RXMED WILLOW AMBULATORY MEDICATION CHARGE: Performed by: INTERNAL MEDICINE

## 2023-10-02 RX ORDER — INFLUENZA A VIRUS A/MICHIGAN/45/2015 X-275 (H1N1) ANTIGEN (FORMALDEHYDE INACTIVATED), INFLUENZA A VIRUS A/SINGAPORE/INFIMH-16-0019/2016 IVR-186 (H3N2) ANTIGEN (FORMALDEHYDE INACTIVATED), INFLUENZA B VIRUS B/PHUKET/3073/2013 ANTIGEN (FORMALDEHYDE INACTIVATED), AND INFLUENZA B VIRUS B/MARYLAND/15/2016 BX-69A ANTIGEN (FORMALDEHYDE INACTIVATED) 60; 60; 60; 60 UG/.7ML; UG/.7ML; UG/.7ML; UG/.7ML
INJECTION, SUSPENSION INTRAMUSCULAR
Qty: 0.7 ML | Refills: 0 | Status: SHIPPED | OUTPATIENT
Start: 2023-10-02 | End: 2024-03-27

## 2023-10-05 DIAGNOSIS — I10 ESSENTIAL HYPERTENSION: ICD-10-CM

## 2023-10-05 RX ORDER — LOSARTAN POTASSIUM 100 MG/1
100 TABLET ORAL DAILY
Qty: 100 TABLET | Refills: 4 | Status: SHIPPED | OUTPATIENT
Start: 2023-10-05

## 2024-01-23 ENCOUNTER — HOSPITAL ENCOUNTER (OUTPATIENT)
Dept: LAB | Facility: MEDICAL CENTER | Age: 74
End: 2024-01-23
Attending: FAMILY MEDICINE
Payer: MEDICARE

## 2024-01-23 DIAGNOSIS — N40.0 BENIGN PROSTATIC HYPERPLASIA WITHOUT LOWER URINARY TRACT SYMPTOMS: ICD-10-CM

## 2024-01-23 DIAGNOSIS — E78.00 PURE HYPERCHOLESTEROLEMIA: ICD-10-CM

## 2024-01-23 LAB
ALBUMIN SERPL BCP-MCNC: 4.1 G/DL (ref 3.2–4.9)
ALBUMIN/GLOB SERPL: 1.3 G/DL
ALP SERPL-CCNC: 70 U/L (ref 30–99)
ALT SERPL-CCNC: 18 U/L (ref 2–50)
ANION GAP SERPL CALC-SCNC: 8 MMOL/L (ref 7–16)
AST SERPL-CCNC: 22 U/L (ref 12–45)
BASOPHILS # BLD AUTO: 1.2 % (ref 0–1.8)
BASOPHILS # BLD: 0.05 K/UL (ref 0–0.12)
BILIRUB SERPL-MCNC: 0.6 MG/DL (ref 0.1–1.5)
BUN SERPL-MCNC: 12 MG/DL (ref 8–22)
CALCIUM ALBUM COR SERPL-MCNC: 8.5 MG/DL (ref 8.5–10.5)
CALCIUM SERPL-MCNC: 8.6 MG/DL (ref 8.5–10.5)
CHLORIDE SERPL-SCNC: 100 MMOL/L (ref 96–112)
CHOLEST SERPL-MCNC: 201 MG/DL (ref 100–199)
CO2 SERPL-SCNC: 26 MMOL/L (ref 20–33)
CREAT SERPL-MCNC: 0.41 MG/DL (ref 0.5–1.4)
EOSINOPHIL # BLD AUTO: 0.09 K/UL (ref 0–0.51)
EOSINOPHIL NFR BLD: 2.2 % (ref 0–6.9)
ERYTHROCYTE [DISTWIDTH] IN BLOOD BY AUTOMATED COUNT: 44.6 FL (ref 35.9–50)
FASTING STATUS PATIENT QL REPORTED: NORMAL
GFR SERPLBLD CREATININE-BSD FMLA CKD-EPI: 105 ML/MIN/1.73 M 2
GLOBULIN SER CALC-MCNC: 3.1 G/DL (ref 1.9–3.5)
GLUCOSE SERPL-MCNC: 89 MG/DL (ref 65–99)
HCT VFR BLD AUTO: 38.2 % (ref 37–47)
HDLC SERPL-MCNC: 71 MG/DL
HGB BLD-MCNC: 12.5 G/DL (ref 12–16)
IMM GRANULOCYTES # BLD AUTO: 0 K/UL (ref 0–0.11)
IMM GRANULOCYTES NFR BLD AUTO: 0 % (ref 0–0.9)
LDLC SERPL CALC-MCNC: 115 MG/DL
LYMPHOCYTES # BLD AUTO: 1.44 K/UL (ref 1–4.8)
LYMPHOCYTES NFR BLD: 35 % (ref 22–41)
MCH RBC QN AUTO: 28.2 PG (ref 27–33)
MCHC RBC AUTO-ENTMCNC: 32.7 G/DL (ref 32.2–35.5)
MCV RBC AUTO: 86.2 FL (ref 81.4–97.8)
MONOCYTES # BLD AUTO: 0.37 K/UL (ref 0–0.85)
MONOCYTES NFR BLD AUTO: 9 % (ref 0–13.4)
NEUTROPHILS # BLD AUTO: 2.17 K/UL (ref 1.82–7.42)
NEUTROPHILS NFR BLD: 52.6 % (ref 44–72)
NRBC # BLD AUTO: 0 K/UL
NRBC BLD-RTO: 0 /100 WBC (ref 0–0.2)
PLATELET # BLD AUTO: 447 K/UL (ref 164–446)
PMV BLD AUTO: 9.2 FL (ref 9–12.9)
POTASSIUM SERPL-SCNC: 4.5 MMOL/L (ref 3.6–5.5)
PROT SERPL-MCNC: 7.2 G/DL (ref 6–8.2)
RBC # BLD AUTO: 4.43 M/UL (ref 4.2–5.4)
SODIUM SERPL-SCNC: 134 MMOL/L (ref 135–145)
T3FREE SERPL-MCNC: 3.14 PG/ML (ref 2–4.4)
T4 FREE SERPL-MCNC: 1.24 NG/DL (ref 0.93–1.7)
TRIGL SERPL-MCNC: 75 MG/DL (ref 0–149)
TSH SERPL DL<=0.005 MIU/L-ACNC: 5.5 UIU/ML (ref 0.38–5.33)
WBC # BLD AUTO: 4.1 K/UL (ref 4.8–10.8)

## 2024-01-23 PROCEDURE — 85025 COMPLETE CBC W/AUTO DIFF WBC: CPT

## 2024-01-23 PROCEDURE — 84443 ASSAY THYROID STIM HORMONE: CPT

## 2024-01-23 PROCEDURE — 84439 ASSAY OF FREE THYROXINE: CPT

## 2024-01-23 PROCEDURE — 80061 LIPID PANEL: CPT

## 2024-01-23 PROCEDURE — 80053 COMPREHEN METABOLIC PANEL: CPT

## 2024-01-23 PROCEDURE — 84481 FREE ASSAY (FT-3): CPT

## 2024-01-23 PROCEDURE — 84153 ASSAY OF PSA TOTAL: CPT

## 2024-01-23 PROCEDURE — 36415 COLL VENOUS BLD VENIPUNCTURE: CPT

## 2024-01-26 ENCOUNTER — OFFICE VISIT (OUTPATIENT)
Dept: MEDICAL GROUP | Age: 74
End: 2024-01-26
Payer: MEDICARE

## 2024-01-26 VITALS
TEMPERATURE: 98.2 F | SYSTOLIC BLOOD PRESSURE: 122 MMHG | DIASTOLIC BLOOD PRESSURE: 76 MMHG | HEART RATE: 68 BPM | OXYGEN SATURATION: 100 % | BODY MASS INDEX: 20.24 KG/M2 | HEIGHT: 62 IN | WEIGHT: 110 LBS

## 2024-01-26 DIAGNOSIS — I10 ESSENTIAL HYPERTENSION: ICD-10-CM

## 2024-01-26 DIAGNOSIS — E78.00 HYPERCHOLESTEROLEMIA: ICD-10-CM

## 2024-01-26 PROCEDURE — 99214 OFFICE O/P EST MOD 30 MIN: CPT | Performed by: FAMILY MEDICINE

## 2024-01-26 PROCEDURE — 3074F SYST BP LT 130 MM HG: CPT | Performed by: FAMILY MEDICINE

## 2024-01-26 PROCEDURE — 3078F DIAST BP <80 MM HG: CPT | Performed by: FAMILY MEDICINE

## 2024-01-26 ASSESSMENT — PATIENT HEALTH QUESTIONNAIRE - PHQ9: CLINICAL INTERPRETATION OF PHQ2 SCORE: 0

## 2024-01-26 ASSESSMENT — FIBROSIS 4 INDEX: FIB4 SCORE: 0.82

## 2024-01-26 NOTE — PROGRESS NOTES
This medical record contains text that has been entered with the assistance of computer voice recognition and dictation software.  Therefore, it may contain unintended errors in text, spelling, punctuation, or grammar      Chief Complaint   Patient presents with    Lab Results         Alysa Sarabia is a 73 y.o. female here evaluation and management of: Routine follow-up    HPI:     HCC Gap Form    Diagnosis to address: I70.0 - Aortic atherosclerosis (HCC)  Assessment and plan: Chronic, stable. Continue with current defined treatment plan: . Follow-up at least annually.  Diagnosis: D70.9 - Neutropenia, unspecified type (HCC)  Assessment and plan: Chronic, stable. Continue with current defined treatment plan: . Follow-up at least annually.  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 Continue with current defined treatment plan: . Follow-up at least annually.  Last edited 01/26/24 09:47 PST by Zac Escobar M.D.           1. Essential hypertension  Amlodipine 2.5 mg p.o. daily  Losartan 100 mg p.o. daily      Overall the patient has been compliant with his medical regimen, denies any adverse side effects such as cough, no syncope, no presyncope, no excessive fatigue.  The patient denies any chest pain today no headaches.    2. Hypercholesterolemia  The patient has been using lifestyle modification to address this.  She would prefer not to take any more medication.       Latest Reference Range & Units 01/23/24 07:25   Cholesterol,Tot 100 - 199 mg/dL 201 (H)   Triglycerides 0 - 149 mg/dL 75   HDL >=40 mg/dL 71   LDL <100 mg/dL 115 (H)   (H): Data is abnormally high    Current medicines (including changes today)  Current Outpatient Medications   Medication Sig Dispense Refill    losartan (COZAAR) 100 MG Tab Take 1 Tablet by mouth every day. 100 Tablet 4    amLODIPine (NORVASC) 2.5 MG Tab Take 1 Tablet by mouth at bedtime for 360 days. 100 Tablet 2    denosumab  (PROLIA) 60 MG/ML Solution Prefilled Syringe injection Inject 1 mL under the skin every 6 months. 1 mL 1    clotrimazole-betamethasone (LOTRISONE) 1-0.05 % Cream Apply 1 g topically.      LUTEIN PO Take  by mouth.      Multiple Vitamins-Minerals (PRESERVISION AREDS PO) Take 1 tablet by mouth 2 times a day.      Cholecalciferol (VITAMIN D3) 125 MCG (5000 UT) Tab Take 1 capsule by mouth every day.      CALCIUM PO Take 1 tablet by mouth every day.      influenza Vac High-Dose Quad (FLUZONE HIGH-DOSE QUADRIVALENT) 0.7 ML Suspension Prefilled Syringe injection Inject  into the shoulder, thigh, or buttocks. (Patient not taking: Reported on 1/26/2024) 0.7 mL 0     No current facility-administered medications for this visit.     She  has a past medical history of Hydronephrosis, Hypertension, Hyponatremia, Leukopenia, Liver cyst, Small bowel obstruction (HCC), and Varicose vein of leg.  She  has a past surgical history that includes breast biopsy (2007); colon resection laparoscopic (3/27/2014); exploratory laparotomy (5/3/2016); cystoscopy stent placement (Right, 5/13/2016); retrogrades (5/13/2016); exploratory laparotomy (12/14/2016); and lumpectomy (Left, 2005).  Social History     Tobacco Use    Smoking status: Never    Smokeless tobacco: Never   Vaping Use    Vaping Use: Never used   Substance Use Topics    Alcohol use: Yes     Alcohol/week: 1.8 oz     Types: 3 Glasses of wine per week     Comment: 3 a week    Drug use: No     Social History     Social History Narrative    Lives with .     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     Family History   Problem Relation Age of Onset    Hypertension Mother     Heart Disease Father     Cancer Brother         neck    Cancer Maternal Grandmother     No Known Problems Maternal Grandfather     No Known Problems Paternal Grandmother     No Known Problems Paternal Grandfather     Alcohol/Drug Neg Hx     Allergies Neg Hx     Diabetes Neg Hx      "Psychiatric Illness Neg Hx     Stroke Neg Hx     Thyroid Neg Hx     Kidney Disease Neg Hx      Family Status   Relation Name Status    Mo  Alive    Fa  Alive    Bro  Alive    MGMo      MGFa      PGMo      PGFa      Neg Hx  (Not Specified)         ROS    The pertinent  ROS findings can be seen in the HPI above.     All other systems reviewed and are negative     Objective:     /76 (BP Location: Left arm, Patient Position: Sitting, BP Cuff Size: Adult)   Pulse 68   Temp 36.8 °C (98.2 °F) (Temporal)   Ht 1.575 m (5' 2\")   Wt 49.9 kg (110 lb)   SpO2 100%  Body mass index is 20.12 kg/m².      Physical Exam:    Constitutional: Alert, no distress.  Skin: No suspicious lesions  Eye: Equal, round and reactive, conjunctiva clear, lids normal.  ENMT: Lips without lesions, good dentition, oropharynx clear.  Neck: Trachea midline, no masses, no thyromegaly. No cervical or supraclavicular lymphadenopathy.  Respiratory: Unlabored respiratory effort, lungs clear to auscultation, no wheezes, no ronchi.  Cardiovascular: Normal S1, S2, no murmur, no edema  Abdomen: Soft, non-tender, no masses, no hepatosplenomegaly.        Assessment and Plan:   The following treatment plan was discussed    All recent labs and provider notes reviewed    1. Essential hypertension    Patient has been stable with current management  We will make no changes for now    2. Hypercholesterolemia     Recommended low-fat Mediterranean diet.  Also for 150 minutes of exercise weekly  Prefers not to take anymore medications.        Instructed to Follow up in clinic or ER for worsening symptoms, difficulty breathing, lack of expected recovery, or should new symptoms or problems arise.    Followup: Return in about 6 months (around 2024) for Reevaluation, labs.               "

## 2024-02-01 ENCOUNTER — HOSPITAL ENCOUNTER (OUTPATIENT)
Dept: RADIOLOGY | Facility: MEDICAL CENTER | Age: 74
End: 2024-02-01
Attending: PHYSICIAN ASSISTANT
Payer: MEDICARE

## 2024-02-01 DIAGNOSIS — R29.898 SHOULDER WEAKNESS: ICD-10-CM

## 2024-02-01 DIAGNOSIS — G89.29 CHRONIC RIGHT SHOULDER PAIN: ICD-10-CM

## 2024-02-01 DIAGNOSIS — M25.511 CHRONIC RIGHT SHOULDER PAIN: ICD-10-CM

## 2024-02-01 DIAGNOSIS — M25.511 ACUTE PAIN OF RIGHT SHOULDER: ICD-10-CM

## 2024-02-01 PROCEDURE — 73221 MRI JOINT UPR EXTREM W/O DYE: CPT | Mod: RT

## 2024-02-02 ENCOUNTER — TELEPHONE (OUTPATIENT)
Dept: ONCOLOGY | Facility: MEDICAL CENTER | Age: 74
End: 2024-02-02
Payer: MEDICARE

## 2024-02-02 NOTE — TELEPHONE ENCOUNTER
Patient called to schedule Prolia treatment.    Let patient know we have received her order twice but they were both incomplete, the first order had no dx codes or written diagnosis, nor did it have a wet signature. The second order had dx and written diagnosis but still had an electronic signature.    Called [phone] [facility] for Lucas MEJÍA to request order be corrected and faxed back to [phone]

## 2024-02-07 LAB — PSA SERPL-MCNC: NORMAL NG/ML (ref 0–4)

## 2024-02-26 ENCOUNTER — HOSPITAL ENCOUNTER (OUTPATIENT)
Dept: RADIOLOGY | Facility: MEDICAL CENTER | Age: 74
End: 2024-02-26
Attending: NURSE PRACTITIONER
Payer: MEDICARE

## 2024-02-26 DIAGNOSIS — M81.0 AGE-RELATED OSTEOPOROSIS WITHOUT CURRENT PATHOLOGICAL FRACTURE: ICD-10-CM

## 2024-02-26 PROCEDURE — 77080 DXA BONE DENSITY AXIAL: CPT

## 2024-03-11 ENCOUNTER — HOSPITAL ENCOUNTER (OUTPATIENT)
Dept: RADIOLOGY | Facility: MEDICAL CENTER | Age: 74
End: 2024-03-11
Attending: OBSTETRICS & GYNECOLOGY
Payer: MEDICARE

## 2024-03-11 ENCOUNTER — OUTPATIENT INFUSION SERVICES (OUTPATIENT)
Dept: ONCOLOGY | Facility: MEDICAL CENTER | Age: 74
End: 2024-03-11
Attending: FAMILY MEDICINE
Payer: MEDICARE

## 2024-03-11 VITALS
WEIGHT: 117.06 LBS | SYSTOLIC BLOOD PRESSURE: 132 MMHG | HEART RATE: 71 BPM | BODY MASS INDEX: 21.54 KG/M2 | RESPIRATION RATE: 18 BRPM | OXYGEN SATURATION: 98 % | HEIGHT: 62 IN | TEMPERATURE: 97.6 F | DIASTOLIC BLOOD PRESSURE: 80 MMHG

## 2024-03-11 DIAGNOSIS — Z12.31 VISIT FOR SCREENING MAMMOGRAM: ICD-10-CM

## 2024-03-11 DIAGNOSIS — M81.0 AGE-RELATED OSTEOPOROSIS WITHOUT CURRENT PATHOLOGICAL FRACTURE: ICD-10-CM

## 2024-03-11 LAB
CA-I BLD ISE-SCNC: 1.09 MMOL/L (ref 1.1–1.3)
CREAT BLD-MCNC: 0.5 MG/DL (ref 0.5–1.4)

## 2024-03-11 PROCEDURE — 77067 SCR MAMMO BI INCL CAD: CPT

## 2024-03-11 PROCEDURE — 700111 HCHG RX REV CODE 636 W/ 250 OVERRIDE (IP): Mod: JZ | Performed by: NURSE PRACTITIONER

## 2024-03-11 PROCEDURE — 82330 ASSAY OF CALCIUM: CPT

## 2024-03-11 PROCEDURE — 36415 COLL VENOUS BLD VENIPUNCTURE: CPT

## 2024-03-11 PROCEDURE — 82565 ASSAY OF CREATININE: CPT

## 2024-03-11 PROCEDURE — 96372 THER/PROPH/DIAG INJ SC/IM: CPT

## 2024-03-11 RX ADMIN — DENOSUMAB 60 MG: 60 INJECTION SUBCUTANEOUS at 15:30

## 2024-03-11 ASSESSMENT — FIBROSIS 4 INDEX: FIB4 SCORE: 0.82

## 2024-03-11 NOTE — PROGRESS NOTES
[facility]. Alysa denied having any new complaints, acute infections, or dental procedures in the last month or scheduled for the next month. 23G butterfly needle used to draw blood from left AC, bleeding controlled with gauze and coban after. iSTAT Calcium = 1.09. Alysa denies any symptoms of hypocalcemia such as paresthesias, muscle twitching, spasms, or cramps). Educated Alysa to take at least 1000 mg PO calcium daily and at least 400 international units vitamin D daily. Alysa verbalized understanding. Prolia injection given in the back of right arm SQ. Alysa tolerated well, adhesive bandage applied to injection site. Message sent to Schedulers for future appointment. Discharged to self care; no apparent distress noted.

## 2024-03-21 PROBLEM — Z00.00 HEALTH CARE MAINTENANCE: Status: RESOLVED | Noted: 2019-04-25 | Resolved: 2024-03-21

## 2024-03-25 ASSESSMENT — ENCOUNTER SYMPTOMS: GENERAL WELL-BEING: GOOD

## 2024-03-25 ASSESSMENT — PATIENT HEALTH QUESTIONNAIRE - PHQ9
2. FEELING DOWN, DEPRESSED, IRRITABLE, OR HOPELESS: NOT AT ALL
1. LITTLE INTEREST OR PLEASURE IN DOING THINGS: NOT AT ALL

## 2024-03-25 ASSESSMENT — ACTIVITIES OF DAILY LIVING (ADL): BATHING_REQUIRES_ASSISTANCE: 0

## 2024-03-26 ASSESSMENT — PATIENT HEALTH QUESTIONNAIRE - PHQ9: CLINICAL INTERPRETATION OF PHQ2 SCORE: 0

## 2024-03-26 NOTE — ASSESSMENT & PLAN NOTE
Chronic, stable. BP today 122/76. Pt monitors BP at home and reports these have been within goal. Denies dizziness/lightheadedness, chest pain, dyspnea. Continue current treatment regime: losartan 100mg daily and amlodipine 2.5mg daily. Follow up with PCP annually for continued monitoring and management.

## 2024-03-26 NOTE — ASSESSMENT & PLAN NOTE
"Chronic, stable. Diagnosis made following incidental finding of disease on imaging. Associated with HLD. Encouraged Mediterranean diet and regular physical exercise. Follow up with PCP at least annually for continued monitoring.     CT abdomen 6/13/2022:  Vasculature: There are scattered arterial calcifications.     Ultrasound abdomen 11/17/2018 :  \"the visualized aorta is normal in caliber. Some atherosclerotic plaque\"  "

## 2024-03-26 NOTE — ASSESSMENT & PLAN NOTE
"Chronic, stable. Diagnosed via findings on Head CT in 2021 with note of \"mildly advanced atrophy for age.\" Pt denies any significant memory or cognitive changes. Minicog today completed without error. Follow up with PCP at least annually for continued monitoring and management.    CT head 6/11/2021  Mildly advanced atrophy for age  "

## 2024-03-26 NOTE — ASSESSMENT & PLAN NOTE
Chronic, stable. Most recent lipid panel from 1/2024 with LDL at 115. Recommend Mediterranean diet and regular physical activity. Follow up with PCP at least annually for continued monitoring and management.   Lab Results   Component Value Date/Time    CHOLSTRLTOT 201 (H) 01/23/2024 07:25 AM     (H) 01/23/2024 07:25 AM    HDL 71 01/23/2024 07:25 AM    TRIGLYCERIDE 75 01/23/2024 07:25 AM

## 2024-03-26 NOTE — ASSESSMENT & PLAN NOTE
Chronic, stable. Last DEXA 2/2024 with osteoporosis of the lumbar spine and osteopenia of the proximal left femur with T scores of -3.3 and -2.4 respectively. Denies hx of fragility fracture. Pt maintains on Prolia every six months with calcium and vitamin D supplementation with weightbearing exercises as tolerated. Follow up with PCP at least annually for continued monitoring and management.   Protocol For Uva1: The patient received UVA1.

## 2024-03-27 ENCOUNTER — APPOINTMENT (OUTPATIENT)
Dept: FAMILY PLANNING/WOMEN'S HEALTH CLINIC | Facility: PHYSICIAN GROUP | Age: 74
End: 2024-03-27
Attending: FAMILY MEDICINE
Payer: MEDICARE

## 2024-03-27 VITALS
DIASTOLIC BLOOD PRESSURE: 76 MMHG | SYSTOLIC BLOOD PRESSURE: 122 MMHG | WEIGHT: 118 LBS | HEIGHT: 62 IN | BODY MASS INDEX: 21.71 KG/M2

## 2024-03-27 DIAGNOSIS — I70.0 AORTIC ATHEROSCLEROSIS (HCC): ICD-10-CM

## 2024-03-27 DIAGNOSIS — M81.0 AGE-RELATED OSTEOPOROSIS WITHOUT CURRENT PATHOLOGICAL FRACTURE: ICD-10-CM

## 2024-03-27 DIAGNOSIS — G31.9 CEREBRAL ATROPHY, MILD (HCC): ICD-10-CM

## 2024-03-27 DIAGNOSIS — I10 ESSENTIAL HYPERTENSION: ICD-10-CM

## 2024-03-27 DIAGNOSIS — E78.00 HYPERCHOLESTEROLEMIA: ICD-10-CM

## 2024-03-27 PROBLEM — D70.9 NEUTROPENIA (HCC): Status: RESOLVED | Noted: 2019-11-04 | Resolved: 2024-03-27

## 2024-03-27 PROCEDURE — 3074F SYST BP LT 130 MM HG: CPT | Performed by: PHYSICIAN ASSISTANT

## 2024-03-27 PROCEDURE — G0439 PPPS, SUBSEQ VISIT: HCPCS | Performed by: PHYSICIAN ASSISTANT

## 2024-03-27 PROCEDURE — 3078F DIAST BP <80 MM HG: CPT | Performed by: PHYSICIAN ASSISTANT

## 2024-03-27 SDOH — ECONOMIC STABILITY: INCOME INSECURITY: IN THE LAST 12 MONTHS, WAS THERE A TIME WHEN YOU WERE NOT ABLE TO PAY THE MORTGAGE OR RENT ON TIME?: NO

## 2024-03-27 SDOH — ECONOMIC STABILITY: FOOD INSECURITY: WITHIN THE PAST 12 MONTHS, YOU WORRIED THAT YOUR FOOD WOULD RUN OUT BEFORE YOU GOT MONEY TO BUY MORE.: NEVER TRUE

## 2024-03-27 SDOH — ECONOMIC STABILITY: FOOD INSECURITY: WITHIN THE PAST 12 MONTHS, THE FOOD YOU BOUGHT JUST DIDN'T LAST AND YOU DIDN'T HAVE MONEY TO GET MORE.: NEVER TRUE

## 2024-03-27 SDOH — ECONOMIC STABILITY: INCOME INSECURITY: HOW HARD IS IT FOR YOU TO PAY FOR THE VERY BASICS LIKE FOOD, HOUSING, MEDICAL CARE, AND HEATING?: NOT HARD AT ALL

## 2024-03-27 SDOH — ECONOMIC STABILITY: TRANSPORTATION INSECURITY
IN THE PAST 12 MONTHS, HAS LACK OF TRANSPORTATION KEPT YOU FROM MEETINGS, WORK, OR FROM GETTING THINGS NEEDED FOR DAILY LIVING?: NO

## 2024-03-27 SDOH — ECONOMIC STABILITY: TRANSPORTATION INSECURITY
IN THE PAST 12 MONTHS, HAS THE LACK OF TRANSPORTATION KEPT YOU FROM MEDICAL APPOINTMENTS OR FROM GETTING MEDICATIONS?: NO

## 2024-03-27 SDOH — ECONOMIC STABILITY: HOUSING INSECURITY
IN THE LAST 12 MONTHS, WAS THERE A TIME WHEN YOU DID NOT HAVE A STEADY PLACE TO SLEEP OR SLEPT IN A SHELTER (INCLUDING NOW)?: NO

## 2024-03-27 SDOH — HEALTH STABILITY: PHYSICAL HEALTH: ON AVERAGE, HOW MANY DAYS PER WEEK DO YOU ENGAGE IN MODERATE TO STRENUOUS EXERCISE (LIKE A BRISK WALK)?: 7 DAYS

## 2024-03-27 SDOH — HEALTH STABILITY: PHYSICAL HEALTH: ON AVERAGE, HOW MANY MINUTES DO YOU ENGAGE IN EXERCISE AT THIS LEVEL?: 70 MIN

## 2024-03-27 ASSESSMENT — FIBROSIS 4 INDEX: FIB4 SCORE: 0.82

## 2024-03-27 NOTE — PROGRESS NOTES
Comprehensive Health Assessment Program     Alysa Sarabia is a 73 y.o. here for her comprehensive health assessment.    Patient Active Problem List    Diagnosis Date Noted    BMI 21.0-21.9, adult 07/18/2023    Aortic atherosclerosis (HCC) 07/18/2023    Cerebral atrophy, mild (HCC) 07/18/2023    Leukopenia 07/18/2023    Age-related osteoporosis without current pathological fracture 10/26/2020    Hyponatremia 11/04/2019    Hypercholesterolemia 04/25/2019    Benign liver cyst 07/24/2017    Essential hypertension 09/03/2015       Current Outpatient Medications   Medication Sig Dispense Refill    losartan (COZAAR) 100 MG Tab Take 1 Tablet by mouth every day. 100 Tablet 4    amLODIPine (NORVASC) 2.5 MG Tab Take 1 Tablet by mouth at bedtime for 360 days. 100 Tablet 2    denosumab (PROLIA) 60 MG/ML Solution Prefilled Syringe injection Inject 1 mL under the skin every 6 months. 1 mL 1    clotrimazole-betamethasone (LOTRISONE) 1-0.05 % Cream Apply 1 g topically.      LUTEIN PO Take  by mouth.      Multiple Vitamins-Minerals (PRESERVISION AREDS PO) Take 1 tablet by mouth 2 times a day.      Cholecalciferol (VITAMIN D3) 125 MCG (5000 UT) Tab Take 1 capsule by mouth every day.      CALCIUM PO Take 1 tablet by mouth every day.       No current facility-administered medications for this visit.          Current supplements as per medication list.     Allergies:   Patient has no known allergies.  Social History     Tobacco Use    Smoking status: Never    Smokeless tobacco: Never   Vaping Use    Vaping Use: Never used   Substance Use Topics    Alcohol use: Yes     Alcohol/week: 1.8 oz     Types: 3 Glasses of wine per week     Comment: 3 a week    Drug use: No     Family History   Problem Relation Age of Onset    Hypertension Mother     Heart Disease Father     Cancer Brother         neck    Cancer Maternal Grandmother     No Known Problems Maternal Grandfather     No Known Problems Paternal Grandmother     No Known  Problems Paternal Grandfather     Alcohol/Drug Neg Hx     Allergies Neg Hx     Diabetes Neg Hx     Psychiatric Illness Neg Hx     Stroke Neg Hx     Thyroid Neg Hx     Kidney Disease Neg Hx      Alysa  has a past medical history of Hydronephrosis, Hypertension, Hyponatremia, Leukopenia, Liver cyst, Neutropenia (HCC), Small bowel obstruction (HCC), and Varicose vein of leg.   Past Surgical History:   Procedure Laterality Date    EXPLORATORY LAPAROTOMY  12/14/2016    Procedure: EXPLORATORY LAPAROTOMY, EXTENSIVE ADHESIOLYSIS, SMALL BOWEL ANASTOMOSIS;  Surgeon: Mac Reddy M.D.;  Location: SURGERY [facility];  Service:     CYSTOSCOPY STENT PLACEMENT Right 5/13/2016    Procedure: CYSTOSCOPY STENT PLACEMENT;  Surgeon: Fabrizio Sun M.D.;  Location: SURGERY [facility];  Service:     RETROGRADES  5/13/2016    Procedure: RETROGRADES;  Surgeon: Fabrizio Sun M.D.;  Location: SURGERY [facility];  Service:     EXPLORATORY LAPAROTOMY  5/3/2016    Procedure: EXPLORATORY LAPAROTOMY- Bowel obstruction;  Surgeon: Mac Reddy M.D.;  Location: SURGERY [facility];  Service:     COLON RESECTION LAPAROSCOPIC  3/27/2014    Performed by Mac Reddy M.D. at SURGERY [facility]    BREAST BIOPSY  2007    LUMPECTOMY Left 2005    benign       Screening:  In the last six months have you experienced any leakage of urine? No    Depression Screening  Little interest or pleasure in doing things?  0 - not at all  Feeling down, depressed , or hopeless? 0 - not at all  Patient Health Questionnaire Score: 0     If depressive symptoms identified deferred to follow up visit unless specifically addressed in assessment and plan.    Interpretation of PHQ-9 Total Score   Score Severity   1-4 No Depression   5-9 Mild Depression   10-14 Moderate Depression   15-19 Moderately Severe Depression   20-27 Severe Depression    Screening for Cognitive Impairment  Do you or any of your friends or family members have any concern  about your memory? No  Three Minute Recall (Leader, Season, Table) 3/3    Miki clock face with all 12 numbers and set the hands to show 10 minutes after 11.  Yes 5  Cognitive concerns identified deferred for follow up unless specifically addressed in assessment and plan.    Fall Risk Assessment  Has the patient had two or more falls in the last year or any fall with injury in the last year?  No    Safety Assessment  Do you always wear your seatbelt?  Yes  Any changes to home needed to function safely? No  Difficulty hearing.  No  Patient counseled about all safety risks that were identified.    Functional Assessment ADLs  Are there any barriers preventing you from cooking for yourself or meeting nutritional needs?  No.    Are there any barriers preventing you from driving safely or obtaining transportation?  No.    Are there any barriers preventing you from using a telephone or calling for help?  No    Are there any barriers preventing you from shopping?  No.    Are there any barriers preventing you from taking care of your own finances?  No    Are there any barriers preventing you from managing your medications?  No    Are there any barriers preventing you from showering, bathing or dressing yourself? No    Are there any barriers preventing you from doing housework or laundry? No  Are there any barriers preventing you from using the toilet?No  Are you currently engaging in any exercise or physical activity?  Yes.      Self-Assessment of Health  What is your perception of your health? Good  Do you sleep more than six hours a night? Yes  In the past 7 days, how much did pain keep you from doing your normal work? None  Do you spend quality time with family or friends (virtually or in person)? Yes  Do you usually eat a heart healthy diet that constists of a variety of fruits, vegetables, whole grains and fiber? Yes  Do you eat foods high in fat and/or Fast Food more than three times per week? No    Advance Care  Planning  Do you have an Advance Directive, Living Will, Durable Power of , or POLST? Yes  Advance Directive Living Will Durable Power of  POLST        Health Maintenance Summary            IMM DTaP/Tdap/Td Vaccine (2 - Td or Tdap) Next due on 7/31/2024 07/31/2014  Imm Admin: Tdap Vaccine    09/03/2008  Imm Admin: TD Vaccine              Annual Wellness Visit (Yearly) Next due on 3/27/2025      03/27/2024  Level of Service: [place] ANNUAL WELLNESS VISIT-INCLUDES PPPS SUBSEQUE*    07/18/2023  Level of Service: [place] ANNUAL WELLNESS VISIT-INCLUDES PPPS SUBSEQUE*    11/08/2021  Subsequent Annual Wellness Visit - Includes PPPS ()    11/08/2021  Visit Dx: Medicare annual wellness visit, subsequent    11/03/2020  Initial Annual Wellness Visit - Includes PPPS ()    Only the first 5 history entries have been loaded, but more history exists.              Mammogram (Every 2 Years) Next due on 3/11/2026      03/11/2024  MA-SCREENING MAMMO BILAT W/TOMOSYNTHESIS W/CAD    01/23/2023  MA-SCREENING MAMMO BILAT W/TOMOSYNTHESIS W/CAD    12/13/2021  MA-SCREENING MAMMO BILAT W/TOMOSYNTHESIS W/CAD    11/30/2020  MA-SCREENING MAMMO BILAT W/TOMOSYNTHESIS W/CAD    10/03/2019  MA-SCREENING MAMMO BILAT W/TOMOSYNTHESIS W/CAD    Only the first 5 history entries have been loaded, but more history exists.              Bone Density Scan (Every 5 Years) Next due on 2/26/2029 02/26/2024  DS-BONE DENSITY STUDY (DEXA)    02/25/2022  DS-BONE DENSITY STUDY (DEXA)    02/11/2020  DS-BONE DENSITY STUDY (DEXA)              Colorectal Cancer Screening (Colonoscopy - Every 10 Years) Next due on 4/20/2032 04/20/2022  Colonoscopy (Done - repeat in 5 years)    01/18/2022  OCCULT BLOOD FECES IMMUNOASSAY    12/15/2013  AMB REFERRAL TO GI FOR COLONOSCOPY    05/10/2012  OCCULT BLOOD FECES IMMUNOASSAY              Hepatitis C Screening  Completed      10/21/2017  Hepatitis C Antibody component of HEP C VIRUS ANTIBODY               Zoster (Shingles) Vaccines (Series Information) Completed      09/05/2019  Imm Admin: Zoster Vaccine Recombinant (RZV) (SHINGRIX)    06/25/2019  Imm Admin: Zoster Vaccine Recombinant (RZV) (SHINGRIX)    11/05/2012  Imm Admin: Zoster Vaccine Live (ZVL) (Zostavax) - HISTORICAL DATA              Pneumococcal Vaccine: 65+ Years (Series Information) Completed      02/08/2023  Imm Admin: Pneumococcal Conjugate Vaccine (PCV20)    09/29/2017  Imm Admin: Pneumococcal Conjugate Vaccine (Prevnar/PCV-13)    12/15/2016  Imm Admin: Pneumococcal polysaccharide vaccine (PPSV-23)              Influenza Vaccine (Series Information) Completed      10/02/2023  Imm Admin: Influenza Vaccine Adult HD    11/14/2022  Imm Admin: Influenza, Unspecified - HISTORICAL DATA    11/08/2021  Imm Admin: Influenza Vaccine Adult HD    11/03/2020  Imm Admin: Influenza Vaccine Adult HD    10/02/2019  Imm Admin: Influenza Vaccine Adult HD    Only the first 5 history entries have been loaded, but more history exists.              COVID-19 Vaccine (Series Information) Completed      10/29/2023  Imm Admin: Covid-19 Mrna (Spikevax) Moderna 12+ Years    10/23/2022  Imm Admin: PFIZER BIVALENT SARS-COV-2 VACCINE (12+)    07/07/2022  Imm Admin: PFIZER LINK CAP SARS-COV-2 VACCINATION (12+)    10/03/2021  Imm Admin: PFIZER PURPLE CAP SARS-COV-2 VACCINATION (12+)    03/18/2021  Imm Admin: PFIZER PURPLE CAP SARS-COV-2 VACCINATION (12+)    Only the first 5 history entries have been loaded, but more history exists.              Hepatitis A Vaccine (Hep A) (Series Information) Aged Out      No completion history exists for this topic.              Hepatitis B Vaccine (Hep B) (Series Information) Aged Out      No completion history exists for this topic.              HPV Vaccines (Series Information) Aged Out      No completion history exists for this topic.              Polio Vaccine (Inactivated Polio) (Series Information) Aged Out      No completion history exists for  "this topic.              Meningococcal Immunization (Series Information) Aged Out      No completion history exists for this topic.              Discontinued - Cervical Cancer Screening  Discontinued        Frequency changed to Never automatically (Topic No Longer Applies)    02/05/2013  Reason not specified                    Patient Care Team:  Zac Escobar M.D. as PCP - General (Family Medicine)  Dr. Regan Rico, OD as Attending Team Physician (Ophthalmology)  CAROL Knutson as Attending Team Physician (Orthopedic Surgery)      Financial Resource Strain: Low Risk  (3/27/2024)    Overall Financial Resource Strain (CARDIA)     Difficulty of Paying Living Expenses: Not hard at all      Transportation Needs: No Transportation Needs (3/27/2024)    PRAPARE - Transportation     Lack of Transportation (Medical): No     Lack of Transportation (Non-Medical): No      Food Insecurity: No Food Insecurity (3/27/2024)    Hunger Vital Sign     Worried About Running Out of Food in the Last Year: Never true     Ran Out of Food in the Last Year: Never true        Encounter Vitals  Blood Pressure : 122/76  Weight: 53.5 kg (118 lb)  Height: 157.5 cm (5' 2\")  BMI (Calculated): 21.58     Alert, oriented in no acute distress.  Eye contact is good, speech goal directed, affect calm.    Assessment and Plan. The following treatment and monitoring plan is recommended:  Hypercholesterolemia  Chronic, stable. Most recent lipid panel from 1/2024 with LDL at 115. Recommend Mediterranean diet and regular physical activity. Follow up with PCP at least annually for continued monitoring and management.   Lab Results   Component Value Date/Time    CHOLSTRLTOT 201 (H) 01/23/2024 07:25 AM     (H) 01/23/2024 07:25 AM    HDL 71 01/23/2024 07:25 AM    TRIGLYCERIDE 75 01/23/2024 07:25 AM       Essential hypertension  Chronic, stable. BP today 122/76. Pt monitors BP at home and reports these have been within goal. Denies " "dizziness/lightheadedness, chest pain, dyspnea. Continue current treatment regime: losartan 100mg daily and amlodipine 2.5mg daily. Follow up with PCP annually for continued monitoring and management.     Aortic atherosclerosis (HCC)  Chronic, stable. Diagnosis made following incidental finding of disease on imaging. Associated with HLD. Encouraged Mediterranean diet and regular physical exercise. Follow up with PCP at least annually for continued monitoring.     CT abdomen 6/13/2022:  Vasculature: There are scattered arterial calcifications.     Ultrasound abdomen 11/17/2018 :  \"the visualized aorta is normal in caliber. Some atherosclerotic plaque\"    Age-related osteoporosis without current pathological fracture  Chronic, stable. Last DEXA 2/2024 with osteoporosis of the lumbar spine and osteopenia of the proximal left femur with T scores of -3.3 and -2.4 respectively. Denies hx of fragility fracture. Pt maintains on Prolia every six months with calcium and vitamin D supplementation with weightbearing exercises as tolerated. Follow up with PCP at least annually for continued monitoring and management.    Cerebral atrophy, mild (HCC)  Chronic, stable. Diagnosed via findings on Head CT in 2021 with note of \"mildly advanced atrophy for age.\" Pt denies any significant memory or cognitive changes. Minicog today completed without error. Follow up with PCP at least annually for continued monitoring and management.    CT head 6/11/2021  Mildly advanced atrophy for age    Services suggested: No services needed at this time  Health Care Screening: Age-appropriate preventive services recommended by USPTF and ACIP covered by Medicare were discussed today. Services ordered if indicated and agreed upon by the patient.  Referrals offered: Community-based lifestyle interventions to reduce health risks and promote self-management and wellness, fall prevention, nutrition, physical activity, tobacco-use cessation, weight loss, and " mental health services as per orders if indicated.    Discussion today about general wellness and lifestyle habits:    Prevent falls and reduce trip hazards; Cautioned about securing or removing rugs.  Have a working fire alarm and carbon monoxide detector.  Engage in regular physical activity and social activities.    Follow-up: Return for follow up visit with PCP as previously scheduled.

## 2024-05-09 DIAGNOSIS — E78.00 PURE HYPERCHOLESTEROLEMIA: ICD-10-CM

## 2024-05-16 ENCOUNTER — APPOINTMENT (OUTPATIENT)
Dept: FAMILY PLANNING/WOMEN'S HEALTH CLINIC | Facility: PHYSICIAN GROUP | Age: 74
End: 2024-05-16
Attending: FAMILY MEDICINE
Payer: MEDICARE

## 2024-05-28 ENCOUNTER — APPOINTMENT (OUTPATIENT)
Dept: MEDICAL GROUP | Age: 74
End: 2024-05-28
Payer: MEDICARE

## 2024-06-21 DIAGNOSIS — I10 ESSENTIAL HYPERTENSION: ICD-10-CM

## 2024-06-25 RX ORDER — AMLODIPINE BESYLATE 2.5 MG/1
2.5 TABLET ORAL
Qty: 100 TABLET | Refills: 0 | Status: SHIPPED | OUTPATIENT
Start: 2024-06-25

## 2024-07-22 ENCOUNTER — HOSPITAL ENCOUNTER (OUTPATIENT)
Dept: LAB | Facility: MEDICAL CENTER | Age: 74
End: 2024-07-22
Attending: FAMILY MEDICINE
Payer: MEDICARE

## 2024-07-22 DIAGNOSIS — E78.00 PURE HYPERCHOLESTEROLEMIA: ICD-10-CM

## 2024-07-22 LAB
25(OH)D3 SERPL-MCNC: 49 NG/ML (ref 30–100)
ALBUMIN SERPL BCP-MCNC: 4 G/DL (ref 3.2–4.9)
ALBUMIN/GLOB SERPL: 1.4 G/DL
ALP SERPL-CCNC: 60 U/L (ref 30–99)
ALT SERPL-CCNC: 18 U/L (ref 2–50)
ANION GAP SERPL CALC-SCNC: 11 MMOL/L (ref 7–16)
AST SERPL-CCNC: 26 U/L (ref 12–45)
BASOPHILS # BLD AUTO: 1 % (ref 0–1.8)
BASOPHILS # BLD: 0.03 K/UL (ref 0–0.12)
BILIRUB SERPL-MCNC: 0.7 MG/DL (ref 0.1–1.5)
BUN SERPL-MCNC: 11 MG/DL (ref 8–22)
CALCIUM ALBUM COR SERPL-MCNC: 8.5 MG/DL (ref 8.5–10.5)
CALCIUM SERPL-MCNC: 8.5 MG/DL (ref 8.5–10.5)
CHLORIDE SERPL-SCNC: 102 MMOL/L (ref 96–112)
CHOLEST SERPL-MCNC: 179 MG/DL (ref 100–199)
CO2 SERPL-SCNC: 23 MMOL/L (ref 20–33)
CREAT SERPL-MCNC: 0.41 MG/DL (ref 0.5–1.4)
EOSINOPHIL # BLD AUTO: 0.06 K/UL (ref 0–0.51)
EOSINOPHIL NFR BLD: 1.9 % (ref 0–6.9)
ERYTHROCYTE [DISTWIDTH] IN BLOOD BY AUTOMATED COUNT: 50.8 FL (ref 35.9–50)
FASTING STATUS PATIENT QL REPORTED: NORMAL
GFR SERPLBLD CREATININE-BSD FMLA CKD-EPI: 104 ML/MIN/1.73 M 2
GLOBULIN SER CALC-MCNC: 2.8 G/DL (ref 1.9–3.5)
GLUCOSE SERPL-MCNC: 82 MG/DL (ref 65–99)
HCT VFR BLD AUTO: 31.9 % (ref 37–47)
HDLC SERPL-MCNC: 73 MG/DL
HGB BLD-MCNC: 10 G/DL (ref 12–16)
IMM GRANULOCYTES # BLD AUTO: 0.01 K/UL (ref 0–0.11)
IMM GRANULOCYTES NFR BLD AUTO: 0.3 % (ref 0–0.9)
LDLC SERPL CALC-MCNC: 93 MG/DL
LYMPHOCYTES # BLD AUTO: 1.34 K/UL (ref 1–4.8)
LYMPHOCYTES NFR BLD: 42.9 % (ref 22–41)
MCH RBC QN AUTO: 24.6 PG (ref 27–33)
MCHC RBC AUTO-ENTMCNC: 31.3 G/DL (ref 32.2–35.5)
MCV RBC AUTO: 78.4 FL (ref 81.4–97.8)
MONOCYTES # BLD AUTO: 0.3 K/UL (ref 0–0.85)
MONOCYTES NFR BLD AUTO: 9.6 % (ref 0–13.4)
NEUTROPHILS # BLD AUTO: 1.38 K/UL (ref 1.82–7.42)
NEUTROPHILS NFR BLD: 44.3 % (ref 44–72)
NRBC # BLD AUTO: 0 K/UL
NRBC BLD-RTO: 0 /100 WBC (ref 0–0.2)
PLATELET # BLD AUTO: 458 K/UL (ref 164–446)
PMV BLD AUTO: 9.2 FL (ref 9–12.9)
POTASSIUM SERPL-SCNC: 4.4 MMOL/L (ref 3.6–5.5)
PROT SERPL-MCNC: 6.8 G/DL (ref 6–8.2)
RBC # BLD AUTO: 4.07 M/UL (ref 4.2–5.4)
SODIUM SERPL-SCNC: 136 MMOL/L (ref 135–145)
T3FREE SERPL-MCNC: 3 PG/ML (ref 2–4.4)
T4 FREE SERPL-MCNC: 1.07 NG/DL (ref 0.93–1.7)
TRIGL SERPL-MCNC: 63 MG/DL (ref 0–149)
TSH SERPL-ACNC: 5.86 UIU/ML (ref 0.35–5.5)
WBC # BLD AUTO: 3.1 K/UL (ref 4.8–10.8)

## 2024-07-22 PROCEDURE — 80061 LIPID PANEL: CPT

## 2024-07-22 PROCEDURE — 80053 COMPREHEN METABOLIC PANEL: CPT

## 2024-07-22 PROCEDURE — 84443 ASSAY THYROID STIM HORMONE: CPT

## 2024-07-22 PROCEDURE — 82306 VITAMIN D 25 HYDROXY: CPT

## 2024-07-22 PROCEDURE — 36415 COLL VENOUS BLD VENIPUNCTURE: CPT

## 2024-07-22 PROCEDURE — 84439 ASSAY OF FREE THYROXINE: CPT

## 2024-07-22 PROCEDURE — 84481 FREE ASSAY (FT-3): CPT

## 2024-07-22 PROCEDURE — 85025 COMPLETE CBC W/AUTO DIFF WBC: CPT

## 2024-07-26 ENCOUNTER — HOSPITAL ENCOUNTER (OUTPATIENT)
Dept: LAB | Facility: MEDICAL CENTER | Age: 74
End: 2024-07-26
Attending: FAMILY MEDICINE
Payer: MEDICARE

## 2024-07-26 ENCOUNTER — OFFICE VISIT (OUTPATIENT)
Dept: MEDICAL GROUP | Age: 74
End: 2024-07-26
Payer: MEDICARE

## 2024-07-26 VITALS
OXYGEN SATURATION: 94 % | RESPIRATION RATE: 16 BRPM | HEART RATE: 66 BPM | SYSTOLIC BLOOD PRESSURE: 122 MMHG | BODY MASS INDEX: 20.8 KG/M2 | DIASTOLIC BLOOD PRESSURE: 70 MMHG | WEIGHT: 113 LBS | TEMPERATURE: 97.9 F | HEIGHT: 62 IN

## 2024-07-26 DIAGNOSIS — I10 ESSENTIAL HYPERTENSION: ICD-10-CM

## 2024-07-26 DIAGNOSIS — E78.00 PURE HYPERCHOLESTEROLEMIA: ICD-10-CM

## 2024-07-26 DIAGNOSIS — D50.0 IRON DEFICIENCY ANEMIA DUE TO CHRONIC BLOOD LOSS: ICD-10-CM

## 2024-07-26 PROBLEM — D50.9 IRON DEFICIENCY ANEMIA: Status: ACTIVE | Noted: 2024-07-26

## 2024-07-26 PROCEDURE — 99214 OFFICE O/P EST MOD 30 MIN: CPT | Performed by: FAMILY MEDICINE

## 2024-07-26 PROCEDURE — 3078F DIAST BP <80 MM HG: CPT | Performed by: FAMILY MEDICINE

## 2024-07-26 PROCEDURE — 3074F SYST BP LT 130 MM HG: CPT | Performed by: FAMILY MEDICINE

## 2024-07-26 ASSESSMENT — FIBROSIS 4 INDEX: FIB4 SCORE: 0.96

## 2024-07-29 ENCOUNTER — HOSPITAL ENCOUNTER (OUTPATIENT)
Dept: RADIOLOGY | Facility: MEDICAL CENTER | Age: 74
End: 2024-07-29
Attending: FAMILY MEDICINE
Payer: MEDICARE

## 2024-07-29 DIAGNOSIS — D50.0 IRON DEFICIENCY ANEMIA DUE TO CHRONIC BLOOD LOSS: ICD-10-CM

## 2024-07-29 PROCEDURE — 76830 TRANSVAGINAL US NON-OB: CPT

## 2024-08-12 ENCOUNTER — HOSPITAL ENCOUNTER (OUTPATIENT)
Dept: LAB | Facility: MEDICAL CENTER | Age: 74
End: 2024-08-12
Attending: FAMILY MEDICINE
Payer: MEDICARE

## 2024-08-12 DIAGNOSIS — E78.00 PURE HYPERCHOLESTEROLEMIA: ICD-10-CM

## 2024-08-12 LAB
BASOPHILS # BLD AUTO: 0.9 % (ref 0–1.8)
BASOPHILS # BLD: 0.04 K/UL (ref 0–0.12)
EOSINOPHIL # BLD AUTO: 0.04 K/UL (ref 0–0.51)
EOSINOPHIL NFR BLD: 0.9 % (ref 0–6.9)
ERYTHROCYTE [DISTWIDTH] IN BLOOD BY AUTOMATED COUNT: 47.8 FL (ref 35.9–50)
FERRITIN SERPL-MCNC: 8.1 NG/ML (ref 10–291)
HCT VFR BLD AUTO: 32.5 % (ref 37–47)
HGB BLD-MCNC: 10.3 G/DL (ref 12–16)
IMM GRANULOCYTES # BLD AUTO: 0.01 K/UL (ref 0–0.11)
IMM GRANULOCYTES NFR BLD AUTO: 0.2 % (ref 0–0.9)
IRON SATN MFR SERPL: 6 % (ref 15–55)
IRON SERPL-MCNC: 24 UG/DL (ref 40–170)
LYMPHOCYTES # BLD AUTO: 1.09 K/UL (ref 1–4.8)
LYMPHOCYTES NFR BLD: 24.4 % (ref 22–41)
MCH RBC QN AUTO: 24.2 PG (ref 27–33)
MCHC RBC AUTO-ENTMCNC: 31.7 G/DL (ref 32.2–35.5)
MCV RBC AUTO: 76.5 FL (ref 81.4–97.8)
MONOCYTES # BLD AUTO: 0.4 K/UL (ref 0–0.85)
MONOCYTES NFR BLD AUTO: 8.9 % (ref 0–13.4)
NEUTROPHILS # BLD AUTO: 2.89 K/UL (ref 1.82–7.42)
NEUTROPHILS NFR BLD: 64.7 % (ref 44–72)
NRBC # BLD AUTO: 0 K/UL
NRBC BLD-RTO: 0 /100 WBC (ref 0–0.2)
PLATELET # BLD AUTO: 495 K/UL (ref 164–446)
PMV BLD AUTO: 8.8 FL (ref 9–12.9)
RBC # BLD AUTO: 4.25 M/UL (ref 4.2–5.4)
TIBC SERPL-MCNC: 381 UG/DL (ref 250–450)
UIBC SERPL-MCNC: 357 UG/DL (ref 110–370)
WBC # BLD AUTO: 4.5 K/UL (ref 4.8–10.8)

## 2024-08-12 PROCEDURE — 36415 COLL VENOUS BLD VENIPUNCTURE: CPT

## 2024-08-12 PROCEDURE — 83021 HEMOGLOBIN CHROMOTOGRAPHY: CPT

## 2024-08-12 PROCEDURE — 85025 COMPLETE CBC W/AUTO DIFF WBC: CPT

## 2024-08-12 PROCEDURE — 83540 ASSAY OF IRON: CPT

## 2024-08-12 PROCEDURE — 83550 IRON BINDING TEST: CPT

## 2024-08-12 PROCEDURE — 82728 ASSAY OF FERRITIN: CPT

## 2024-08-14 ENCOUNTER — HOSPITAL ENCOUNTER (OUTPATIENT)
Dept: HEMATOLOGY ONCOLOGY | Facility: MEDICAL CENTER | Age: 74
End: 2024-08-14
Attending: STUDENT IN AN ORGANIZED HEALTH CARE EDUCATION/TRAINING PROGRAM
Payer: MEDICARE

## 2024-08-14 VITALS
SYSTOLIC BLOOD PRESSURE: 116 MMHG | HEIGHT: 62 IN | TEMPERATURE: 97.3 F | DIASTOLIC BLOOD PRESSURE: 74 MMHG | OXYGEN SATURATION: 96 % | WEIGHT: 114.8 LBS | HEART RATE: 71 BPM | BODY MASS INDEX: 21.12 KG/M2

## 2024-08-14 DIAGNOSIS — D50.0 IRON DEFICIENCY ANEMIA DUE TO CHRONIC BLOOD LOSS: ICD-10-CM

## 2024-08-14 PROCEDURE — 99212 OFFICE O/P EST SF 10 MIN: CPT | Performed by: STUDENT IN AN ORGANIZED HEALTH CARE EDUCATION/TRAINING PROGRAM

## 2024-08-14 RX ORDER — SODIUM CHLORIDE 9 MG/ML
INJECTION, SOLUTION INTRAVENOUS CONTINUOUS
Status: CANCELLED | OUTPATIENT
Start: 2024-08-19

## 2024-08-14 RX ORDER — 0.9 % SODIUM CHLORIDE 0.9 %
3 VIAL (ML) INJECTION PRN
Status: CANCELLED | OUTPATIENT
Start: 2024-08-19

## 2024-08-14 RX ORDER — 0.9 % SODIUM CHLORIDE 0.9 %
10 VIAL (ML) INJECTION PRN
Status: CANCELLED | OUTPATIENT
Start: 2024-08-19

## 2024-08-14 RX ORDER — METHYLPREDNISOLONE SODIUM SUCCINATE 125 MG/2ML
125 INJECTION, POWDER, LYOPHILIZED, FOR SOLUTION INTRAMUSCULAR; INTRAVENOUS PRN
Status: CANCELLED | OUTPATIENT
Start: 2024-08-19

## 2024-08-14 RX ORDER — FERROUS SULFATE 325(65) MG
325 TABLET ORAL DAILY
Qty: 30 TABLET | Refills: 2 | Status: SHIPPED | OUTPATIENT
Start: 2024-08-14

## 2024-08-14 RX ORDER — DIPHENHYDRAMINE HYDROCHLORIDE 50 MG/ML
50 INJECTION INTRAMUSCULAR; INTRAVENOUS PRN
Status: CANCELLED | OUTPATIENT
Start: 2024-08-19

## 2024-08-14 RX ORDER — EPINEPHRINE 1 MG/ML(1)
0.5 AMPUL (ML) INJECTION PRN
Status: CANCELLED | OUTPATIENT
Start: 2024-08-19

## 2024-08-14 RX ORDER — 0.9 % SODIUM CHLORIDE 0.9 %
VIAL (ML) INJECTION PRN
Status: CANCELLED | OUTPATIENT
Start: 2024-08-19

## 2024-08-14 ASSESSMENT — FIBROSIS 4 INDEX: FIB4 SCORE: 0.89

## 2024-08-14 ASSESSMENT — PAIN SCALES - GENERAL: PAINLEVEL: NO PAIN

## 2024-08-15 LAB
HGB A1 MFR BLD: 97.2 % (ref 95–97.9)
HGB A2 MFR BLD: 2.4 % (ref 2–3.5)
HGB C MFR BLD: 0 % (ref 0–0)
HGB E MFR BLD: 0 % (ref 0–0)
HGB F MFR BLD: 0.4 % (ref 0–2.1)
HGB FRACT BLD ELPH-IMP: NORMAL
HGB OTHER MFR BLD: 0 % (ref 0–0)
HGB S BLD QL SOLY: NORMAL
HGB S MFR BLD: 0 % (ref 0–0)
PATH INTERP BLD-IMP: NORMAL

## 2024-08-15 ASSESSMENT — ENCOUNTER SYMPTOMS
ORTHOPNEA: 0
SINUS PAIN: 0
NAUSEA: 0
MYALGIAS: 0
BLURRED VISION: 0
HEADACHES: 0
VOMITING: 0
BACK PAIN: 0
BRUISES/BLEEDS EASILY: 0
DIAPHORESIS: 0
TINGLING: 0
CHILLS: 0
DOUBLE VISION: 0
CONSTIPATION: 0
WEAKNESS: 0
PALPITATIONS: 0
DIARRHEA: 0
HEARTBURN: 0
COUGH: 0
SHORTNESS OF BREATH: 0
INSOMNIA: 0
ABDOMINAL PAIN: 0
NERVOUS/ANXIOUS: 0
SPUTUM PRODUCTION: 0
FEVER: 0
BLOOD IN STOOL: 0
WHEEZING: 0
SORE THROAT: 0
WEIGHT LOSS: 0
DIZZINESS: 0

## 2024-08-15 NOTE — PROGRESS NOTES
Consult:  Hematology/Oncology      Referring Physician: PCP  Primary Care:  Zac Escobar M.D.    Diagnosis: Iron deficiency anemia    Chief Complaint:  Iron deficiency, weakness, fatigue    History of Presenting Illness:  Alysa Sarabia is a 74 y.o.  woman who presents to the clinic for evaluation for iron deficiency anemia. She has had issues previously with this and she denies any bleeding problems. She does feel weak and fatigued though, and was noted to have significant iron deficiency with a ferritin of 8. She was referred for evaluation.     Interval History:  Patient is here for consultation. She feels fatigued. She last had a colonoscopy in 2022. She denies any bleeding. She is wondering what to do. Her  is with her today.     Past Medical History:   Diagnosis Date    Hydronephrosis     Hypertension     Hyponatremia     Leukopenia     Liver cyst     benign, no f/u    Neutropenia (HCC)     Small bowel obstruction (HCC)     R    Varicose vein of leg        Past Surgical History:   Procedure Laterality Date    EXPLORATORY LAPAROTOMY  12/14/2016    Procedure: EXPLORATORY LAPAROTOMY, EXTENSIVE ADHESIOLYSIS, SMALL BOWEL ANASTOMOSIS;  Surgeon: Mac Reddy M.D.;  Location: [facility];  Service:     CYSTOSCOPY STENT PLACEMENT Right 5/13/2016    Procedure: CYSTOSCOPY STENT PLACEMENT;  Surgeon: Fabrizio Sun M.D.;  Location: [facility];  Service:     RETROGRADES  5/13/2016    Procedure: RETROGRADES;  Surgeon: Fabrizio Sun M.D.;  Location: [facility];  Service:     EXPLORATORY LAPAROTOMY  5/3/2016    Procedure: EXPLORATORY LAPAROTOMY- Bowel obstruction;  Surgeon: Mac Reddy M.D.;  Location: [facility];  Service:     COLON RESECTION LAPAROSCOPIC  3/27/2014    Performed by Mac Reddy M.D. at [facility]    BREAST BIOPSY  2007    LUMPECTOMY Left 2005    benign       Social History     Socioeconomic History    Marital  status:      Spouse name: Not on file    Number of children: Not on file    Years of education: Not on file    Highest education level: Not on file   Occupational History    Not on file   Tobacco Use    Smoking status: Never    Smokeless tobacco: Never   Vaping Use    Vaping status: Never Used   Substance and Sexual Activity    Alcohol use: Yes     Alcohol/week: 1.8 oz     Types: 3 Glasses of wine per week     Comment: 3 a week    Drug use: No    Sexual activity: Yes     Partners: Male     Comment: , one child, Rocketick (German)   Other Topics Concern    Not on file   Social History Narrative    Lives with .     Children: 1    Work: teacher in [employer]     Social Determinants of Health     Financial Resource Strain: Low Risk  (3/27/2024)    Overall Financial Resource Strain (CARDIA)     Difficulty of Paying Living Expenses: Not hard at all   Food Insecurity: No Food Insecurity (3/27/2024)    Hunger Vital Sign     Worried About Running Out of Food in the Last Year: Never true     Ran Out of Food in the Last Year: Never true   Transportation Needs: No Transportation Needs (3/27/2024)    PRAPARE - Transportation     Lack of Transportation (Medical): No     Lack of Transportation (Non-Medical): No   Physical Activity: Sufficiently Active (3/27/2024)    Exercise Vital Sign     Days of Exercise per Week: 7 days     Minutes of Exercise per Session: 70 min   Stress: Not on file   Social Connections: Not on file   Intimate Partner Violence: Not on file   Housing Stability: Unknown (3/27/2024)    Housing Stability Vital Sign     Unable to Pay for Housing in the Last Year: No     Number of Places Lived in the Last Year: Not on file     Unstable Housing in the Last Year: No       Family History   Problem Relation Age of Onset    Hypertension Mother     Heart Disease Father     Cancer Brother         neck    Cancer Maternal Grandmother     No Known Problems Maternal Grandfather      No Known Problems Paternal Grandmother     No Known Problems Paternal Grandfather     Alcohol/Drug Neg Hx     Allergies Neg Hx     Diabetes Neg Hx     Psychiatric Illness Neg Hx     Stroke Neg Hx     Thyroid Neg Hx     Kidney Disease Neg Hx        OB History    Para Term  AB Living   2 2 2         SAB IAB Ectopic Molar Multiple Live Births                    # Outcome Date GA Lbr Trevon/2nd Weight Sex Type Anes PTL Lv   2 Term            1 Term                Allergies as of 2024    (No Known Allergies)         Current Outpatient Medications:     ferrous sulfate 325 (65 Fe) MG tablet, Take 1 Tablet by mouth every day., Disp: 30 Tablet, Rfl: 2    amLODIPine (NORVASC) 2.5 MG Tab, TAKE 1 TABLET BY MOUTH AT BEDTIME, Disp: 100 Tablet, Rfl: 0    losartan (COZAAR) 100 MG Tab, Take 1 Tablet by mouth every day., Disp: 100 Tablet, Rfl: 4    denosumab (PROLIA) 60 MG/ML Solution Prefilled Syringe injection, Inject 1 mL under the skin every 6 months., Disp: 1 mL, Rfl: 1    clotrimazole-betamethasone (LOTRISONE) 1-0.05 % Cream, Apply 1 g topically., Disp: , Rfl:     LUTEIN PO, Take  by mouth., Disp: , Rfl:     Multiple Vitamins-Minerals (PRESERVISION AREDS PO), Take 1 tablet by mouth 2 times a day., Disp: , Rfl:     Cholecalciferol (VITAMIN D3) 125 MCG (5000 UT) Tab, Take 1 capsule by mouth every day., Disp: , Rfl:     CALCIUM PO, Take 1 tablet by mouth every day., Disp: , Rfl:     Review of Systems:  Review of Systems   Constitutional:  Positive for malaise/fatigue. Negative for chills, diaphoresis, fever and weight loss.   HENT:  Negative for hearing loss, nosebleeds, sinus pain and sore throat.    Eyes:  Negative for blurred vision and double vision.   Respiratory:  Negative for cough, sputum production, shortness of breath and wheezing.    Cardiovascular:  Negative for chest pain, palpitations, orthopnea and leg swelling.   Gastrointestinal:  Negative for abdominal pain, blood in stool, constipation,  "diarrhea, heartburn, melena, nausea and vomiting.   Genitourinary:  Negative for dysuria, frequency, hematuria and urgency.   Musculoskeletal:  Negative for back pain, joint pain and myalgias.   Skin:  Negative for rash.   Neurological:  Negative for dizziness, tingling, weakness and headaches.   Endo/Heme/Allergies:  Does not bruise/bleed easily.   Psychiatric/Behavioral:  The patient is not nervous/anxious and does not have insomnia.           Physical Exam:  Vitals:    08/14/24 0829   BP: 116/74   Pulse: 71   Temp: 36.3 °C (97.3 °F)   TempSrc: Temporal   SpO2: 96%   Weight: 52.1 kg (114 lb 12.8 oz)   Height: 1.575 m (5' 2.01\")       DESC; KARNOFSKY SCALE WITH ECOG EQUIVALENT: 100, Fully active, able to carry on all pre-disease performed without restriction (ECOG equivalent 0)    DISTRESS LEVEL: no apparent distress    Physical Exam  Vitals and nursing note reviewed.   Constitutional:       General: She is awake. She is not in acute distress.     Appearance: Normal appearance. She is normal weight. She is not ill-appearing, toxic-appearing or diaphoretic.   HENT:      Head: Normocephalic and atraumatic.      Nose: Nose normal. No congestion.      Mouth/Throat:      Pharynx: Oropharynx is clear. No oropharyngeal exudate or posterior oropharyngeal erythema.   Eyes:      General: No scleral icterus.     Extraocular Movements: Extraocular movements intact.      Conjunctiva/sclera: Conjunctivae normal.      Pupils: Pupils are equal, round, and reactive to light.      Comments: Pale conjunctivae   Cardiovascular:      Rate and Rhythm: Normal rate and regular rhythm.      Pulses: Normal pulses.      Heart sounds: Normal heart sounds. No murmur heard.     No friction rub. No gallop.   Pulmonary:      Effort: Pulmonary effort is normal.      Breath sounds: Normal breath sounds. No decreased air movement. No wheezing, rhonchi or rales.   Abdominal:      General: Bowel sounds are normal. There is no distension.      " Tenderness: There is no abdominal tenderness.   Musculoskeletal:         General: No deformity. Normal range of motion.      Cervical back: Normal range of motion and neck supple. No tenderness.      Right lower leg: No edema.      Left lower leg: No edema.   Lymphadenopathy:      Cervical: No cervical adenopathy.      Upper Body:      Right upper body: No axillary adenopathy.      Left upper body: No axillary adenopathy.      Lower Body: No right inguinal adenopathy. No left inguinal adenopathy.   Skin:     General: Skin is warm and dry.      Coloration: Skin is not jaundiced.      Findings: No erythema or rash.   Neurological:      General: No focal deficit present.      Mental Status: She is alert and oriented to person, place, and time.      Sensory: Sensation is intact.      Motor: Motor function is intact. No weakness.      Gait: Gait is intact.   Psychiatric:         Attention and Perception: Attention normal.         Mood and Affect: Mood normal.         Behavior: Behavior normal. Behavior is cooperative.         Thought Content: Thought content normal.         Judgment: Judgment normal.          Depression Screening    Little interest or pleasure in doing things?      Feeling down, depressed , or hopeless?     Trouble falling or staying asleep, or sleeping too much?      Feeling tired or having little energy?      Poor appetite or overeating?      Feeling bad about yourself - or that you are a failure or have let yourself or your family down?     Trouble concentrating on things, such as reading the newspaper or watching television?     Moving or speaking so slowly that other people could have noticed.  Or the opposite - being so fidgety or restless that you have been moving around a lot more than usual?      Thoughts that you would be better off dead, or of hurting yourself?      Patient Health Questionnaire Score:         If depressive symptoms identified deferred to follow up visit unless specifically  addressed in assesment and plan.    Interpretation of PHQ-9 Total Score   Score Severity   1-4 No Depression   5-9 Mild Depression   10-14 Moderate Depression   15-19 Moderately Severe Depression   20-27 Severe Depression    Labs:  Hospital Outpatient Visit on 08/12/2024   Component Date Value Ref Range Status    WBC 08/12/2024 4.5 (L)  4.8 - 10.8 K/uL Final    RBC 08/12/2024 4.25  4.20 - 5.40 M/uL Final    Hemoglobin 08/12/2024 10.3 (L)  12.0 - 16.0 g/dL Final    Hematocrit 08/12/2024 32.5 (L)  37.0 - 47.0 % Final    MCV 08/12/2024 76.5 (L)  81.4 - 97.8 fL Final    MCH 08/12/2024 24.2 (L)  27.0 - 33.0 pg Final    MCHC 08/12/2024 31.7 (L)  32.2 - 35.5 g/dL Final    RDW 08/12/2024 47.8  35.9 - 50.0 fL Final    Platelet Count 08/12/2024 495 (H)  164 - 446 K/uL Final    MPV 08/12/2024 8.8 (L)  9.0 - 12.9 fL Final    Neutrophils-Polys 08/12/2024 64.70  44.00 - 72.00 % Final    Lymphocytes 08/12/2024 24.40  22.00 - 41.00 % Final    Monocytes 08/12/2024 8.90  0.00 - 13.40 % Final    Eosinophils 08/12/2024 0.90  0.00 - 6.90 % Final    Basophils 08/12/2024 0.90  0.00 - 1.80 % Final    Immature Granulocytes 08/12/2024 0.20  0.00 - 0.90 % Final    Nucleated RBC 08/12/2024 0.00  0.00 - 0.20 /100 WBC Final    Neutrophils (Absolute) 08/12/2024 2.89  1.82 - 7.42 K/uL Final    Includes immature neutrophils, if present.    Lymphs (Absolute) 08/12/2024 1.09  1.00 - 4.80 K/uL Final    Monos (Absolute) 08/12/2024 0.40  0.00 - 0.85 K/uL Final    Eos (Absolute) 08/12/2024 0.04  0.00 - 0.51 K/uL Final    Baso (Absolute) 08/12/2024 0.04  0.00 - 0.12 K/uL Final    Immature Granulocytes (abs) 08/12/2024 0.01  0.00 - 0.11 K/uL Final    NRBC (Absolute) 08/12/2024 0.00  K/uL Final    Ferritin 08/12/2024 8.1 (L)  10.0 - 291.0 ng/mL Final    Iron 08/12/2024 24 (L)  40 - 170 ug/dL Final    Total Iron Binding 08/12/2024 381  250 - 450 ug/dL Final    Unsat Iron Binding 08/12/2024 357  110 - 370 ug/dL Final    % Saturation 08/12/2024 6 (L)  15 - 55  % Final    Hemoglobin A1 08/12/2024 97.2  95.0 - 97.9 % Final    Hemoglobin A2 08/12/2024 2.4  2.0 - 3.5 % Final    Hemoglobin F 08/12/2024 0.4  0.0 - 2.1 % Final    Hemoglobin S 08/12/2024 0.0  0.0 - 0.0 % Final    Hemaglobin C 08/12/2024 0.0  0.0 - 0.0 % Final    Hemoglobin E 08/12/2024 0.0  0.0 - 0.0 % Final    Hemoglobin - Other 08/12/2024 0.0  0.0 - 0.0 % Final    Hemoglobin Eval 08/12/2024 See Note   Final    Comment: Impression: Normal Hemoglobin evaluation.  Normal HPLC and capillary electrophoresis results do not rule out  the possibility of alpha globin gene deletions associated with  silent carrier status or alpha thalassemia trait. Individuals who  carry a rare, Greek beta thalassemia variant often have a normal  Hb A2 and may not be identified by this assay. Please correlate  with clinical and laboratory findings.  INTERPRETIVE INFORMATION: Hemoglobin Evaluation, with Reflex  to Electrophoresis and/or RBC  Solubility  This test was developed and its performance characteristics  determined by [facility]. It has not been cleared or  approved by the U.S. Food and Drug Administration. This test was  performed in a CLIA-certified laboratory and is intended for  clinical purposes.      Hgb Solubility 08/12/2024 Not Performed   Final    Comment: INTERPRETIVE INFORMATION: Sickle Cell Solubility Reflex  Not Performed: Solubility testing for Hemoglobin S not indicated.  Positive: Positive for Hemoglobin S by HPLC and confirmed by  solubility testing. Additional charges apply.  Conf Previous: Positive for Hemoglobin S by HPLC. Solubility  testing performed previously and not repeated with this submission.      Hemoglobin,Capillary Electrophores* 08/12/2024 Not Performed   Final    Comment: INTERPRETIVE INFORMATION: Hgb Capillary Electrophoresis  Reflex  Not Performed: Confirmation by Capillary Electrophoresis not  indicated.  Performed: Results confirmed by Capillary Electrophoresis.  Additional charges  apply.  Conf Previous: Capillary Electrophoresis confirmation performed as  part of a previous submission. Confirmation not repeated with this  submission.  Performed By: [facility]  [address] [place] [address]  : Humberto Bellamy MD, PhD  CLIA Number: [id number]         Imaging:   All listed images below have been independently reviewed by me. I agree with the findings as summarized below:    US-PELVIC COMPLETE (TRANSABDOMINAL/TRANSVAGINAL) (COMBO)    Result Date: 7/29/2024 7/29/2024 3:16 PM HISTORY/REASON FOR EXAM:  Anemia TECHNIQUE/EXAM DESCRIPTION: Transabdominal and transvaginal pelvic ultrasound. COMPARISON:   None FINDINGS: Both transabdominal and transvaginal scanning were performed to optimally visualize the pelvis. UTERUS: The uterus measures 5.0 x 5.94 cm x 6.72 cm. The uterine myometrium is inhomogeneous. Multiple calcifications are present in the uterine wall. The endometrial echo complex is not visualized due to shadowing from uterine wall calcifications. The endometrium is unremarkable in appearance and thickness for age and menstrual status. OVARIES: Ovaries are not visualized due to overlying bowel gas. There is no free fluid seen.     1.  Dense calcifications in the uterine wall are identified which could be due to calcified leiomyomas. 2.  Endometrium is not visualized due to shadowing from calcifications.       Pathology:  NA    Assessment & Plan:  1. Iron deficiency anemia due to chronic blood loss  ferrous sulfate 325 (65 Fe) MG tablet          This is a 74 year old  woman with iron-deficiency anemia. She presents for evaluation.     Current Diagnosis and Staging: Iron deficiency anemia    Treatment Plan: IV ferumoxytol to replete iron, then maintain with oral iron. She will get EGD and colonoscopy done as well.     Treatment Citation: NA    Plan of Care:    Primary Therapy: IV ferumoxytol  Supportive Therapy: Oral iron for continued  supplementation  Toxicity: NA  Labs: CBC with diff, ferritin, iron panel monitoring  Imaging: Patient needs EGD and colonoscopy to evaluate for bleeding. Discussed with GI (Dr. Graves) and he will get her scheduled.   Treatment Planning: Patient has iron deficiency anemia and needs iron replacement. She will get this done with IV iron and will get an EGD and colonoscopy to evaluate for root cause.   Consultations: GI (Dr. Graves)  Code Status: Full  Miscellaneous: NA  Return for Follow Up: 3 months    Any questions and concerns raised by the patient were answered to the best of my ability. Thank you for allowing me to participate in the care for this patient. Please feel free to contact me for any questions or concerns.     Total time spent on chart review, clinic encounter, and documentation: 47 minutes.

## 2024-08-16 ENCOUNTER — OUTPATIENT INFUSION SERVICES (OUTPATIENT)
Dept: ONCOLOGY | Facility: MEDICAL CENTER | Age: 74
End: 2024-08-16
Attending: STUDENT IN AN ORGANIZED HEALTH CARE EDUCATION/TRAINING PROGRAM
Payer: MEDICARE

## 2024-08-16 VITALS
DIASTOLIC BLOOD PRESSURE: 71 MMHG | BODY MASS INDEX: 21.73 KG/M2 | HEIGHT: 61 IN | OXYGEN SATURATION: 99 % | RESPIRATION RATE: 18 BRPM | SYSTOLIC BLOOD PRESSURE: 123 MMHG | HEART RATE: 66 BPM | TEMPERATURE: 97.6 F | WEIGHT: 115.08 LBS

## 2024-08-16 DIAGNOSIS — D50.0 IRON DEFICIENCY ANEMIA DUE TO CHRONIC BLOOD LOSS: ICD-10-CM

## 2024-08-16 PROCEDURE — 700111 HCHG RX REV CODE 636 W/ 250 OVERRIDE (IP): Mod: JZ,JG | Performed by: STUDENT IN AN ORGANIZED HEALTH CARE EDUCATION/TRAINING PROGRAM

## 2024-08-16 PROCEDURE — 96365 THER/PROPH/DIAG IV INF INIT: CPT

## 2024-08-16 PROCEDURE — 700105 HCHG RX REV CODE 258: Performed by: STUDENT IN AN ORGANIZED HEALTH CARE EDUCATION/TRAINING PROGRAM

## 2024-08-16 RX ORDER — EPINEPHRINE 1 MG/ML(1)
0.5 AMPUL (ML) INJECTION PRN
Status: CANCELLED | OUTPATIENT
Start: 2024-08-23

## 2024-08-16 RX ORDER — SODIUM CHLORIDE 9 MG/ML
INJECTION, SOLUTION INTRAVENOUS CONTINUOUS
Status: CANCELLED | OUTPATIENT
Start: 2024-08-23

## 2024-08-16 RX ORDER — 0.9 % SODIUM CHLORIDE 0.9 %
VIAL (ML) INJECTION PRN
Status: CANCELLED | OUTPATIENT
Start: 2024-08-23

## 2024-08-16 RX ORDER — 0.9 % SODIUM CHLORIDE 0.9 %
10 VIAL (ML) INJECTION PRN
Status: CANCELLED | OUTPATIENT
Start: 2024-08-23

## 2024-08-16 RX ORDER — METHYLPREDNISOLONE SODIUM SUCCINATE 125 MG/2ML
125 INJECTION, POWDER, LYOPHILIZED, FOR SOLUTION INTRAMUSCULAR; INTRAVENOUS PRN
Status: CANCELLED | OUTPATIENT
Start: 2024-08-23

## 2024-08-16 RX ORDER — DIPHENHYDRAMINE HYDROCHLORIDE 50 MG/ML
50 INJECTION INTRAMUSCULAR; INTRAVENOUS PRN
Status: CANCELLED | OUTPATIENT
Start: 2024-08-23

## 2024-08-16 RX ORDER — 0.9 % SODIUM CHLORIDE 0.9 %
3 VIAL (ML) INJECTION PRN
Status: CANCELLED | OUTPATIENT
Start: 2024-08-23

## 2024-08-16 RX ADMIN — FERUMOXYTOL 510 MG: 510 INJECTION INTRAVENOUS at 16:26

## 2024-08-16 ASSESSMENT — FIBROSIS 4 INDEX: FIB4 SCORE: 0.89

## 2024-08-17 NOTE — PROGRESS NOTES
[facility] Feraheme infusion for iron deficiency anemia. Oriented to the unit and plan of care reviewed. Provided an opportunity for questions. Pt denied having any new or acute complaints today. PIV started to right FA, had positive blood return and flushed briskly. Labs were drawn on 08/12/2024 and reviewed. Pt meets parameters for treatment today. Pt given Feraheme as prescribed, tolerated well, denied having any complaints during or after infusion. PIV discontinued, bleeding controlled with gauze and Coban. Alysa will return on 08/23/2024 for her next appointment. Pt discharged to home in good condition.

## 2024-08-24 ENCOUNTER — OUTPATIENT INFUSION SERVICES (OUTPATIENT)
Dept: ONCOLOGY | Facility: MEDICAL CENTER | Age: 74
End: 2024-08-24
Attending: STUDENT IN AN ORGANIZED HEALTH CARE EDUCATION/TRAINING PROGRAM
Payer: MEDICARE

## 2024-08-24 VITALS
HEIGHT: 61 IN | TEMPERATURE: 97.1 F | DIASTOLIC BLOOD PRESSURE: 70 MMHG | WEIGHT: 113.54 LBS | HEART RATE: 73 BPM | BODY MASS INDEX: 21.44 KG/M2 | OXYGEN SATURATION: 98 % | SYSTOLIC BLOOD PRESSURE: 124 MMHG | RESPIRATION RATE: 18 BRPM

## 2024-08-24 DIAGNOSIS — D50.0 IRON DEFICIENCY ANEMIA DUE TO CHRONIC BLOOD LOSS: ICD-10-CM

## 2024-08-24 PROCEDURE — 700105 HCHG RX REV CODE 258: Performed by: STUDENT IN AN ORGANIZED HEALTH CARE EDUCATION/TRAINING PROGRAM

## 2024-08-24 PROCEDURE — 700111 HCHG RX REV CODE 636 W/ 250 OVERRIDE (IP): Mod: JZ,JG | Performed by: STUDENT IN AN ORGANIZED HEALTH CARE EDUCATION/TRAINING PROGRAM

## 2024-08-24 PROCEDURE — 96365 THER/PROPH/DIAG IV INF INIT: CPT

## 2024-08-24 RX ORDER — EPINEPHRINE 1 MG/ML(1)
0.5 AMPUL (ML) INJECTION PRN
Status: CANCELLED | OUTPATIENT
Start: 2024-08-24

## 2024-08-24 RX ORDER — SODIUM CHLORIDE 9 MG/ML
INJECTION, SOLUTION INTRAVENOUS CONTINUOUS
Status: CANCELLED | OUTPATIENT
Start: 2024-08-24

## 2024-08-24 RX ORDER — DIPHENHYDRAMINE HYDROCHLORIDE 50 MG/ML
50 INJECTION INTRAMUSCULAR; INTRAVENOUS PRN
Status: CANCELLED | OUTPATIENT
Start: 2024-08-24

## 2024-08-24 RX ORDER — 0.9 % SODIUM CHLORIDE 0.9 %
3 VIAL (ML) INJECTION PRN
Status: CANCELLED | OUTPATIENT
Start: 2024-08-24

## 2024-08-24 RX ORDER — METHYLPREDNISOLONE SODIUM SUCCINATE 125 MG/2ML
125 INJECTION, POWDER, LYOPHILIZED, FOR SOLUTION INTRAMUSCULAR; INTRAVENOUS PRN
Status: CANCELLED | OUTPATIENT
Start: 2024-08-24

## 2024-08-24 RX ORDER — 0.9 % SODIUM CHLORIDE 0.9 %
VIAL (ML) INJECTION PRN
Status: CANCELLED | OUTPATIENT
Start: 2024-08-24

## 2024-08-24 RX ORDER — 0.9 % SODIUM CHLORIDE 0.9 %
10 VIAL (ML) INJECTION PRN
Status: CANCELLED | OUTPATIENT
Start: 2024-08-24

## 2024-08-24 RX ADMIN — FERUMOXYTOL 510 MG: 510 INJECTION INTRAVENOUS at 15:40

## 2024-08-24 ASSESSMENT — FIBROSIS 4 INDEX: FIB4 SCORE: 0.89

## 2024-08-25 NOTE — PROGRESS NOTES
Alysa arrives to [facility] for second dose Feraheme for iron deficiency anemia. Patient denies acute health concerns. Reports tolerating first dose of iron without side effects at home. 24g PIV placed to LAC, which flushes easily and has brisk blood return. Feraheme infused over 30 mins without adverse s/s. 30 min post-iron observation completed without issues. Patient tolerated infusion well. PIV flushed and removed with tip intact. Patient to follow up with Dr. Espinosa. She has no future [facility] appts at this time. Discharged home to self care in no apparent distress.

## 2024-09-12 ENCOUNTER — OUTPATIENT INFUSION SERVICES (OUTPATIENT)
Dept: ONCOLOGY | Facility: MEDICAL CENTER | Age: 74
End: 2024-09-12
Attending: NURSE PRACTITIONER
Payer: MEDICARE

## 2024-09-12 VITALS
SYSTOLIC BLOOD PRESSURE: 112 MMHG | HEART RATE: 75 BPM | DIASTOLIC BLOOD PRESSURE: 66 MMHG | WEIGHT: 116.4 LBS | BODY MASS INDEX: 21.98 KG/M2 | TEMPERATURE: 97.7 F | RESPIRATION RATE: 18 BRPM | HEIGHT: 61 IN | OXYGEN SATURATION: 96 %

## 2024-09-12 DIAGNOSIS — M81.0 AGE-RELATED OSTEOPOROSIS WITHOUT CURRENT PATHOLOGICAL FRACTURE: ICD-10-CM

## 2024-09-12 LAB
CA-I BLD ISE-SCNC: 1.22 MMOL/L (ref 1.1–1.3)
CREAT BLD-MCNC: 0.5 MG/DL (ref 0.5–1.4)

## 2024-09-12 PROCEDURE — 82330 ASSAY OF CALCIUM: CPT

## 2024-09-12 PROCEDURE — 82565 ASSAY OF CREATININE: CPT

## 2024-09-12 PROCEDURE — 700111 HCHG RX REV CODE 636 W/ 250 OVERRIDE (IP): Mod: JZ,JG | Performed by: NURSE PRACTITIONER

## 2024-09-12 PROCEDURE — 96372 THER/PROPH/DIAG INJ SC/IM: CPT

## 2024-09-12 RX ADMIN — DENOSUMAB 60 MG: 60 INJECTION SUBCUTANEOUS at 15:24

## 2024-09-12 ASSESSMENT — FIBROSIS 4 INDEX: FIB4 SCORE: 0.89

## 2024-09-12 NOTE — PROGRESS NOTES
Alysa arrives ambulatory to [facility] for Prolia injection. Labs drawn by venupuncture to left AC. Site covered with gauze and coban. Results reviewed and Prolia injected into back left arm. Message sent to schedulers for next appointment.

## 2024-09-17 ENCOUNTER — HOSPITAL ENCOUNTER (OUTPATIENT)
Dept: LAB | Facility: MEDICAL CENTER | Age: 74
End: 2024-09-17
Attending: INTERNAL MEDICINE
Payer: MEDICARE

## 2024-09-17 PROCEDURE — 82746 ASSAY OF FOLIC ACID SERUM: CPT

## 2024-09-17 PROCEDURE — 86340 INTRINSIC FACTOR ANTIBODY: CPT

## 2024-09-17 PROCEDURE — 82607 VITAMIN B-12: CPT

## 2024-09-17 PROCEDURE — 83516 IMMUNOASSAY NONANTIBODY: CPT

## 2024-09-17 PROCEDURE — 36415 COLL VENOUS BLD VENIPUNCTURE: CPT

## 2024-09-18 LAB
FOLATE SERPL-MCNC: 18.6 NG/ML
VIT B12 SERPL-MCNC: 495 PG/ML (ref 211–911)

## 2024-09-19 LAB — PCA IGG SER-ACNC: 131.7 UNITS (ref 0–24.9)

## 2024-09-20 DIAGNOSIS — I10 ESSENTIAL HYPERTENSION: ICD-10-CM

## 2024-09-20 LAB — IF BLOCK AB SER QL RIA: NEGATIVE

## 2024-09-20 RX ORDER — AMLODIPINE BESYLATE 2.5 MG/1
2.5 TABLET ORAL
Qty: 100 TABLET | Refills: 2 | Status: SHIPPED | OUTPATIENT
Start: 2024-09-20 | End: 2024-09-27

## 2024-09-27 DIAGNOSIS — I10 ESSENTIAL HYPERTENSION: ICD-10-CM

## 2024-09-27 RX ORDER — AMLODIPINE BESYLATE 2.5 MG/1
2.5 TABLET ORAL
Qty: 100 TABLET | Refills: 2 | Status: SHIPPED | OUTPATIENT
Start: 2024-09-27

## 2024-10-01 ENCOUNTER — HOSPITAL ENCOUNTER (OUTPATIENT)
Dept: RADIOLOGY | Facility: MEDICAL CENTER | Age: 74
End: 2024-10-01
Attending: NURSE PRACTITIONER
Payer: MEDICARE

## 2024-10-01 DIAGNOSIS — K29.40 ATROPHIC GASTRITIS, PRESENCE OF BLEEDING UNSPECIFIED: ICD-10-CM

## 2024-10-01 PROCEDURE — 74018 RADEX ABDOMEN 1 VIEW: CPT

## 2024-10-03 DIAGNOSIS — D50.0 IRON DEFICIENCY ANEMIA DUE TO CHRONIC BLOOD LOSS: ICD-10-CM

## 2024-10-03 RX ORDER — FERROUS SULFATE 325(65) MG
325 TABLET ORAL DAILY
Qty: 30 TABLET | Refills: 2 | Status: SHIPPED | OUTPATIENT
Start: 2024-10-03

## 2024-10-10 ENCOUNTER — PATIENT MESSAGE (OUTPATIENT)
Dept: MEDICAL GROUP | Age: 74
End: 2024-10-10
Payer: MEDICARE

## 2024-10-17 ENCOUNTER — OFFICE VISIT (OUTPATIENT)
Dept: MEDICAL GROUP | Age: 74
End: 2024-10-17
Payer: MEDICARE

## 2024-10-17 VITALS
WEIGHT: 114 LBS | DIASTOLIC BLOOD PRESSURE: 64 MMHG | SYSTOLIC BLOOD PRESSURE: 110 MMHG | TEMPERATURE: 97.4 F | HEART RATE: 65 BPM | BODY MASS INDEX: 22.38 KG/M2 | HEIGHT: 60 IN | OXYGEN SATURATION: 98 %

## 2024-10-17 DIAGNOSIS — I10 ESSENTIAL HYPERTENSION: ICD-10-CM

## 2024-10-17 DIAGNOSIS — M17.12 PRIMARY OSTEOARTHRITIS OF LEFT KNEE: ICD-10-CM

## 2024-10-17 PROCEDURE — 3074F SYST BP LT 130 MM HG: CPT | Performed by: STUDENT IN AN ORGANIZED HEALTH CARE EDUCATION/TRAINING PROGRAM

## 2024-10-17 PROCEDURE — 3078F DIAST BP <80 MM HG: CPT | Performed by: STUDENT IN AN ORGANIZED HEALTH CARE EDUCATION/TRAINING PROGRAM

## 2024-10-17 PROCEDURE — 99214 OFFICE O/P EST MOD 30 MIN: CPT | Performed by: STUDENT IN AN ORGANIZED HEALTH CARE EDUCATION/TRAINING PROGRAM

## 2024-10-17 RX ORDER — POLYETHYLENE GLYCOL-3350 AND ELECTROLYTES 236; 6.74; 5.86; 2.97; 22.74 G/274.31G; G/274.31G; G/274.31G; G/274.31G; G/274.31G
240 POWDER, FOR SOLUTION ORAL ONCE
COMMUNITY
Start: 2024-10-04

## 2024-10-17 ASSESSMENT — ENCOUNTER SYMPTOMS
DIZZINESS: 0
CHILLS: 0
FALLS: 0
MYALGIAS: 0
WEAKNESS: 0
DOUBLE VISION: 0
DEPRESSION: 0
COUGH: 0
BACK PAIN: 0
PALPITATIONS: 0
HEADACHES: 0
SHORTNESS OF BREATH: 0
BLOOD IN STOOL: 0
BLURRED VISION: 0
FEVER: 0

## 2024-10-17 ASSESSMENT — FIBROSIS 4 INDEX: FIB4 SCORE: 0.89

## 2024-10-23 DIAGNOSIS — I10 ESSENTIAL HYPERTENSION: ICD-10-CM

## 2024-10-24 RX ORDER — LOSARTAN POTASSIUM 100 MG/1
100 TABLET ORAL DAILY
Qty: 100 TABLET | Refills: 2 | Status: SHIPPED | OUTPATIENT
Start: 2024-10-24 | End: 2024-10-29 | Stop reason: SDUPTHER

## 2024-10-29 DIAGNOSIS — I10 ESSENTIAL HYPERTENSION: ICD-10-CM

## 2024-10-30 RX ORDER — LOSARTAN POTASSIUM 100 MG/1
100 TABLET ORAL DAILY
Qty: 100 TABLET | Refills: 2 | Status: SHIPPED | OUTPATIENT
Start: 2024-10-30

## 2024-11-07 ENCOUNTER — HOSPITAL ENCOUNTER (OUTPATIENT)
Dept: LAB | Facility: MEDICAL CENTER | Age: 74
End: 2024-11-07
Attending: STUDENT IN AN ORGANIZED HEALTH CARE EDUCATION/TRAINING PROGRAM
Payer: MEDICARE

## 2024-11-07 DIAGNOSIS — D50.0 IRON DEFICIENCY ANEMIA DUE TO CHRONIC BLOOD LOSS: ICD-10-CM

## 2024-11-07 DIAGNOSIS — E78.00 PURE HYPERCHOLESTEROLEMIA: ICD-10-CM

## 2024-11-07 LAB
BASOPHILS # BLD AUTO: 1 % (ref 0–1.8)
BASOPHILS # BLD: 0.04 K/UL (ref 0–0.12)
EOSINOPHIL # BLD AUTO: 0.07 K/UL (ref 0–0.51)
EOSINOPHIL NFR BLD: 1.8 % (ref 0–6.9)
ERYTHROCYTE [DISTWIDTH] IN BLOOD BY AUTOMATED COUNT: 53.1 FL (ref 35.9–50)
FERRITIN SERPL-MCNC: 147 NG/ML (ref 10–291)
HCT VFR BLD AUTO: 41.6 % (ref 37–47)
HGB BLD-MCNC: 14.4 G/DL (ref 12–16)
IMM GRANULOCYTES # BLD AUTO: 0 K/UL (ref 0–0.11)
IMM GRANULOCYTES NFR BLD AUTO: 0 % (ref 0–0.9)
IRON SATN MFR SERPL: 51 % (ref 15–55)
IRON SERPL-MCNC: 143 UG/DL (ref 40–170)
LYMPHOCYTES # BLD AUTO: 1.42 K/UL (ref 1–4.8)
LYMPHOCYTES NFR BLD: 37.2 % (ref 22–41)
MCH RBC QN AUTO: 31.3 PG (ref 27–33)
MCHC RBC AUTO-ENTMCNC: 34.6 G/DL (ref 32.2–35.5)
MCV RBC AUTO: 90.4 FL (ref 81.4–97.8)
MONOCYTES # BLD AUTO: 0.37 K/UL (ref 0–0.85)
MONOCYTES NFR BLD AUTO: 9.7 % (ref 0–13.4)
NEUTROPHILS # BLD AUTO: 1.92 K/UL (ref 1.82–7.42)
NEUTROPHILS NFR BLD: 50.3 % (ref 44–72)
NRBC # BLD AUTO: 0 K/UL
NRBC BLD-RTO: 0 /100 WBC (ref 0–0.2)
PLATELET # BLD AUTO: 372 K/UL (ref 164–446)
PMV BLD AUTO: 9 FL (ref 9–12.9)
RBC # BLD AUTO: 4.6 M/UL (ref 4.2–5.4)
TIBC SERPL-MCNC: 280 UG/DL (ref 250–450)
UIBC SERPL-MCNC: 137 UG/DL (ref 110–370)
WBC # BLD AUTO: 3.8 K/UL (ref 4.8–10.8)

## 2024-11-07 PROCEDURE — 36415 COLL VENOUS BLD VENIPUNCTURE: CPT

## 2024-11-07 PROCEDURE — 85025 COMPLETE CBC W/AUTO DIFF WBC: CPT

## 2024-11-07 PROCEDURE — 83550 IRON BINDING TEST: CPT

## 2024-11-07 PROCEDURE — 83540 ASSAY OF IRON: CPT

## 2024-11-07 PROCEDURE — 82728 ASSAY OF FERRITIN: CPT

## 2024-11-13 ENCOUNTER — HOSPITAL ENCOUNTER (OUTPATIENT)
Dept: HEMATOLOGY ONCOLOGY | Facility: MEDICAL CENTER | Age: 74
End: 2024-11-13
Attending: STUDENT IN AN ORGANIZED HEALTH CARE EDUCATION/TRAINING PROGRAM
Payer: MEDICARE

## 2024-11-13 VITALS
HEART RATE: 71 BPM | TEMPERATURE: 97.7 F | SYSTOLIC BLOOD PRESSURE: 116 MMHG | WEIGHT: 114.2 LBS | DIASTOLIC BLOOD PRESSURE: 68 MMHG | BODY MASS INDEX: 22.42 KG/M2 | HEIGHT: 60 IN | OXYGEN SATURATION: 96 %

## 2024-11-13 DIAGNOSIS — D50.0 IRON DEFICIENCY ANEMIA DUE TO CHRONIC BLOOD LOSS: ICD-10-CM

## 2024-11-13 PROCEDURE — 99213 OFFICE O/P EST LOW 20 MIN: CPT | Performed by: STUDENT IN AN ORGANIZED HEALTH CARE EDUCATION/TRAINING PROGRAM

## 2024-11-13 PROCEDURE — 99212 OFFICE O/P EST SF 10 MIN: CPT | Performed by: STUDENT IN AN ORGANIZED HEALTH CARE EDUCATION/TRAINING PROGRAM

## 2024-11-13 ASSESSMENT — ENCOUNTER SYMPTOMS
BRUISES/BLEEDS EASILY: 0
PALPITATIONS: 0
DIZZINESS: 0
VOMITING: 0
NAUSEA: 0
WHEEZING: 0
MYALGIAS: 0
NERVOUS/ANXIOUS: 0
SORE THROAT: 0
DIAPHORESIS: 0
TINGLING: 0
HEADACHES: 0
BLURRED VISION: 0
WEIGHT LOSS: 0
HEARTBURN: 0
ORTHOPNEA: 0
ABDOMINAL PAIN: 0
INSOMNIA: 0
COUGH: 0
DIARRHEA: 0
SINUS PAIN: 0
BACK PAIN: 0
SPUTUM PRODUCTION: 0
WEAKNESS: 0
DOUBLE VISION: 0
CONSTIPATION: 0
FEVER: 0
CHILLS: 0
BLOOD IN STOOL: 0
SHORTNESS OF BREATH: 0

## 2024-11-13 ASSESSMENT — FIBROSIS 4 INDEX: FIB4 SCORE: 1.19

## 2024-11-13 ASSESSMENT — PAIN SCALES - GENERAL: PAINLEVEL_OUTOF10: NO PAIN

## 2024-11-13 NOTE — PROGRESS NOTES
Follow Up Note:  Hematology/Oncology      Primary Care:  Zac Escobar M.D.    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  woman who presents to the clinic for evaluation for iron deficiency anemia. She has had issues previously with this and she denies any bleeding problems. She does feel weak and fatigued though, and was noted to have significant iron deficiency with a ferritin of 8. She was referred for evaluation.     Treatment History:   08/16/24: IV ferumoxytol administered    Interval History:  Patient is here for follow up visit. She feels well and has no complaints.     Allergies as of 11/13/2024    (No Known Allergies)         Current Outpatient Medications:     losartan (COZAAR) 100 MG Tab, Take 1 Tablet by mouth every day., Disp: 100 Tablet, Rfl: 2    ferrous sulfate 325 (65 Fe) MG tablet, Take 1 Tablet by mouth every day., Disp: 30 Tablet, Rfl: 2    amLODIPine (NORVASC) 2.5 MG Tab, TAKE 1 TABLET BY MOUTH AT BEDTIME, Disp: 100 Tablet, Rfl: 2    denosumab (PROLIA) 60 MG/ML Solution Prefilled Syringe injection, Inject 1 mL under the skin every 6 months., Disp: 1 mL, Rfl: 1    clotrimazole-betamethasone (LOTRISONE) 1-0.05 % Cream, Apply 1 g topically., Disp: , Rfl:     LUTEIN PO, Take  by mouth., Disp: , Rfl:     Multiple Vitamins-Minerals (PRESERVISION AREDS PO), Take 1 tablet by mouth 2 times a day., Disp: , Rfl:     Cholecalciferol (VITAMIN D3) 125 MCG (5000 UT) Tab, Take 1 capsule by mouth every day., Disp: , Rfl:     CALCIUM PO, Take 1 tablet by mouth every day., Disp: , Rfl:     GAVILYTE-G 236 g Recon Soln, Take 240 mL by mouth one time. (Patient not taking: Reported on 11/13/2024), Disp: , Rfl:       Review of Systems:  Review of Systems   Constitutional:  Negative for chills,  diaphoresis, fever, malaise/fatigue and weight loss.   HENT:  Negative for hearing loss, nosebleeds, sinus pain and sore throat.    Eyes:  Negative for blurred vision and double vision.   Respiratory:  Negative for cough, sputum production, shortness of breath and wheezing.    Cardiovascular:  Negative for chest pain, palpitations, orthopnea and leg swelling.   Gastrointestinal:  Negative for abdominal pain, blood in stool, constipation, diarrhea, heartburn, melena, nausea and vomiting.   Genitourinary:  Negative for dysuria, frequency, hematuria and urgency.   Musculoskeletal:  Negative for back pain, joint pain and myalgias.   Skin:  Negative for rash.   Neurological:  Negative for dizziness, tingling, weakness and headaches.   Endo/Heme/Allergies:  Does not bruise/bleed easily.   Psychiatric/Behavioral:  The patient is not nervous/anxious and does not have insomnia.          Physical Exam:  Vitals:    11/13/24 1014   BP: 116/68   Pulse: 71   Temp: 36.5 °C (97.7 °F)   TempSrc: Temporal   SpO2: 96%   Weight: 51.8 kg (114 lb 3.2 oz)   Height: 1.524 m (5')       DESC; KARNOFSKY SCALE WITH ECOG EQUIVALENT: 100, Fully active, able to carry on all pre-disease performed without restriction (ECOG equivalent 0)    DISTRESS LEVEL: no apparent distress    Physical Exam  Vitals and nursing note reviewed.   Constitutional:       General: She is awake. She is not in acute distress.     Appearance: Normal appearance. She is normal weight. She is not ill-appearing, toxic-appearing or diaphoretic.   HENT:      Head: Normocephalic and atraumatic.      Nose: Nose normal. No congestion.      Mouth/Throat:      Pharynx: Oropharynx is clear. No oropharyngeal exudate or posterior oropharyngeal erythema.   Eyes:      General: No scleral icterus.     Extraocular Movements: Extraocular movements intact.      Conjunctiva/sclera: Conjunctivae normal.      Pupils: Pupils are equal, round, and reactive to light.   Cardiovascular:      Rate and  Rhythm: Normal rate and regular rhythm.      Pulses: Normal pulses.      Heart sounds: Normal heart sounds. No murmur heard.     No friction rub. No gallop.   Pulmonary:      Effort: Pulmonary effort is normal.      Breath sounds: Normal breath sounds. No decreased air movement. No wheezing, rhonchi or rales.   Abdominal:      General: Bowel sounds are normal. There is no distension.      Tenderness: There is no abdominal tenderness.   Musculoskeletal:         General: No deformity. Normal range of motion.      Cervical back: Normal range of motion and neck supple. No tenderness.      Right lower leg: No edema.      Left lower leg: No edema.   Lymphadenopathy:      Cervical: No cervical adenopathy.      Upper Body:      Right upper body: No axillary adenopathy.      Left upper body: No axillary adenopathy.      Lower Body: No right inguinal adenopathy. No left inguinal adenopathy.   Skin:     General: Skin is warm and dry.      Coloration: Skin is not jaundiced.      Findings: No erythema or rash.   Neurological:      General: No focal deficit present.      Mental Status: She is alert and oriented to person, place, and time.      Sensory: Sensation is intact.      Motor: Motor function is intact. No weakness.      Gait: Gait is intact.   Psychiatric:         Attention and Perception: Attention normal.         Mood and Affect: Mood normal.         Behavior: Behavior normal. Behavior is cooperative.         Thought Content: Thought content normal.         Judgment: Judgment normal.           Labs:  Hospital Outpatient Visit on 11/07/2024   Component Date Value Ref Range Status    WBC 11/07/2024 3.8 (L)  4.8 - 10.8 K/uL Final    RBC 11/07/2024 4.60  4.20 - 5.40 M/uL Final    Hemoglobin 11/07/2024 14.4  12.0 - 16.0 g/dL Final    Hematocrit 11/07/2024 41.6  37.0 - 47.0 % Final    MCV 11/07/2024 90.4  81.4 - 97.8 fL Final    MCH 11/07/2024 31.3  27.0 - 33.0 pg Final    MCHC 11/07/2024 34.6  32.2 - 35.5 g/dL Final    RDW  11/07/2024 53.1 (H)  35.9 - 50.0 fL Final    Platelet Count 11/07/2024 372  164 - 446 K/uL Final    MPV 11/07/2024 9.0  9.0 - 12.9 fL Final    Neutrophils-Polys 11/07/2024 50.30  44.00 - 72.00 % Final    Lymphocytes 11/07/2024 37.20  22.00 - 41.00 % Final    Monocytes 11/07/2024 9.70  0.00 - 13.40 % Final    Eosinophils 11/07/2024 1.80  0.00 - 6.90 % Final    Basophils 11/07/2024 1.00  0.00 - 1.80 % Final    Immature Granulocytes 11/07/2024 0.00  0.00 - 0.90 % Final    Nucleated RBC 11/07/2024 0.00  0.00 - 0.20 /100 WBC Final    Neutrophils (Absolute) 11/07/2024 1.92  1.82 - 7.42 K/uL Final    Includes immature neutrophils, if present.    Lymphs (Absolute) 11/07/2024 1.42  1.00 - 4.80 K/uL Final    Monos (Absolute) 11/07/2024 0.37  0.00 - 0.85 K/uL Final    Eos (Absolute) 11/07/2024 0.07  0.00 - 0.51 K/uL Final    Baso (Absolute) 11/07/2024 0.04  0.00 - 0.12 K/uL Final    Immature Granulocytes (abs) 11/07/2024 0.00  0.00 - 0.11 K/uL Final    NRBC (Absolute) 11/07/2024 0.00  K/uL Final    Ferritin 11/07/2024 147.0  10.0 - 291.0 ng/mL Final    Iron 11/07/2024 143  40 - 170 ug/dL Final    Total Iron Binding 11/07/2024 280  250 - 450 ug/dL Final    Unsat Iron Binding 11/07/2024 137  110 - 370 ug/dL Final    Comment: The hemolysis index of the specimen exceeds the allowed tolerance for the  test. Result may be affected.?Specimen recollection is recommended to  confirm the result.      % Saturation 11/07/2024 51  15 - 55 % Final       Imaging:     All listed images below have been independently reviewed by me. I agree with the findings as summarized below:    No results found.    Pathology:  Autoimmune gastritis found on EGD    Assessment & Plan:  1. Iron deficiency anemia due to chronic blood loss          This is a 74 year old  woman with iron-deficiency anemia. She presents for evaluation.      Current Diagnosis and Staging: Iron deficiency anemia    Update: The patient is doing well at this time. 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 Continue PO iron and monitoring. If levels drop again, will give IV iron again.      Treatment Plan: IV ferumoxytol to replete iron, then maintain with oral iron.      Treatment Citation: DAYO     Plan of Care:     Primary Therapy: NA  Supportive Therapy: Oral iron for continued supplementation  Toxicity: NA  Labs: CBC with diff, ferritin, iron panel monitoring  Imaging: Patient to continue endoscopic surveillance for autoimmune gastritis.   Treatment Planning: Patient has iron deficiency anemia and needs iron replacement. Continue to monitor autoimmune gastritis.   Consultations: GI (Dr. Graves)  Code Status: Full  Miscellaneous: NA  Return for Follow Up: 3 months follow up lab results and return to clinic PRN    Any questions and concerns raised by the patient were answered to the best of my ability. Thank you for allowing me to participate in the care for this patient. Please feel free to contact me for any questions or concerns.       Total time spent on chart review, clinic encounter, and documentation: 28 minutes.

## 2024-12-19 ENCOUNTER — HOSPITAL ENCOUNTER (OUTPATIENT)
Dept: RADIOLOGY | Facility: MEDICAL CENTER | Age: 74
End: 2024-12-19
Attending: NURSE PRACTITIONER
Payer: MEDICARE

## 2024-12-19 DIAGNOSIS — D53.9 ANEMIA, DEFICIENCY: ICD-10-CM

## 2024-12-19 DIAGNOSIS — E61.1 IRON DEFICIENCY: ICD-10-CM

## 2024-12-19 PROCEDURE — 74018 RADEX ABDOMEN 1 VIEW: CPT

## 2024-12-23 ENCOUNTER — HOSPITAL ENCOUNTER (OUTPATIENT)
Dept: RADIOLOGY | Facility: MEDICAL CENTER | Age: 74
End: 2024-12-23
Attending: NURSE PRACTITIONER
Payer: MEDICARE

## 2024-12-23 DIAGNOSIS — D64.9 ANEMIA, UNSPECIFIED TYPE: ICD-10-CM

## 2024-12-23 PROCEDURE — 74018 RADEX ABDOMEN 1 VIEW: CPT

## 2025-01-07 ENCOUNTER — HOSPITAL ENCOUNTER (OUTPATIENT)
Dept: RADIOLOGY | Facility: MEDICAL CENTER | Age: 75
End: 2025-01-07
Attending: INTERNAL MEDICINE
Payer: MEDICARE

## 2025-01-07 ENCOUNTER — HOSPITAL ENCOUNTER (OUTPATIENT)
Dept: LAB | Facility: MEDICAL CENTER | Age: 75
End: 2025-01-07
Attending: INTERNAL MEDICINE
Payer: MEDICARE

## 2025-01-07 DIAGNOSIS — D53.9 ANEMIA, DEFICIENCY: ICD-10-CM

## 2025-01-07 LAB
BUN SERPL-MCNC: 14 MG/DL (ref 8–22)
CREAT SERPL-MCNC: 0.48 MG/DL (ref 0.5–1.4)
GFR SERPLBLD CREATININE-BSD FMLA CKD-EPI: 100 ML/MIN/1.73 M 2

## 2025-01-07 PROCEDURE — 36415 COLL VENOUS BLD VENIPUNCTURE: CPT

## 2025-01-07 PROCEDURE — 82565 ASSAY OF CREATININE: CPT

## 2025-01-07 PROCEDURE — 74018 RADEX ABDOMEN 1 VIEW: CPT

## 2025-01-07 PROCEDURE — 84520 ASSAY OF UREA NITROGEN: CPT

## 2025-01-20 ENCOUNTER — PATIENT MESSAGE (OUTPATIENT)
Dept: MEDICAL GROUP | Age: 75
End: 2025-01-20
Payer: MEDICARE

## 2025-01-20 DIAGNOSIS — E78.2 MIXED HYPERLIPIDEMIA: ICD-10-CM

## 2025-01-20 DIAGNOSIS — E55.9 VITAMIN D DEFICIENCY: ICD-10-CM

## 2025-01-22 ENCOUNTER — HOSPITAL ENCOUNTER (OUTPATIENT)
Dept: RADIOLOGY | Facility: MEDICAL CENTER | Age: 75
End: 2025-01-22
Attending: INTERNAL MEDICINE
Payer: MEDICARE

## 2025-01-22 DIAGNOSIS — D53.9 ANEMIA, DEFICIENCY: ICD-10-CM

## 2025-01-22 PROCEDURE — 700117 HCHG RX CONTRAST REV CODE 255: Performed by: INTERNAL MEDICINE

## 2025-01-22 PROCEDURE — 74177 CT ABD & PELVIS W/CONTRAST: CPT

## 2025-01-22 RX ADMIN — IOHEXOL 50 ML: 240 INJECTION, SOLUTION INTRATHECAL; INTRAVASCULAR; INTRAVENOUS; ORAL at 11:22

## 2025-01-22 RX ADMIN — IOHEXOL 100 ML: 350 INJECTION, SOLUTION INTRAVENOUS at 11:22

## 2025-01-29 ENCOUNTER — HOSPITAL ENCOUNTER (OUTPATIENT)
Dept: LAB | Facility: MEDICAL CENTER | Age: 75
End: 2025-01-29
Attending: FAMILY MEDICINE
Payer: MEDICARE

## 2025-01-29 DIAGNOSIS — E55.9 VITAMIN D DEFICIENCY: ICD-10-CM

## 2025-01-29 DIAGNOSIS — E78.2 MIXED HYPERLIPIDEMIA: ICD-10-CM

## 2025-01-29 LAB
25(OH)D3 SERPL-MCNC: 47 NG/ML (ref 30–100)
ALBUMIN SERPL BCP-MCNC: 4.1 G/DL (ref 3.2–4.9)
ALBUMIN/GLOB SERPL: 1.5 G/DL
ALP SERPL-CCNC: 52 U/L (ref 30–99)
ALT SERPL-CCNC: 19 U/L (ref 2–50)
ANION GAP SERPL CALC-SCNC: 8 MMOL/L (ref 7–16)
AST SERPL-CCNC: 24 U/L (ref 12–45)
BASOPHILS # BLD AUTO: 1.4 % (ref 0–1.8)
BASOPHILS # BLD: 0.05 K/UL (ref 0–0.12)
BILIRUB SERPL-MCNC: 0.8 MG/DL (ref 0.1–1.5)
BUN SERPL-MCNC: 13 MG/DL (ref 8–22)
CALCIUM ALBUM COR SERPL-MCNC: 9 MG/DL (ref 8.5–10.5)
CALCIUM SERPL-MCNC: 9.1 MG/DL (ref 8.5–10.5)
CHLORIDE SERPL-SCNC: 100 MMOL/L (ref 96–112)
CHOLEST SERPL-MCNC: 200 MG/DL (ref 100–199)
CO2 SERPL-SCNC: 27 MMOL/L (ref 20–33)
CREAT SERPL-MCNC: 0.63 MG/DL (ref 0.5–1.4)
EOSINOPHIL # BLD AUTO: 0.08 K/UL (ref 0–0.51)
EOSINOPHIL NFR BLD: 2.2 % (ref 0–6.9)
ERYTHROCYTE [DISTWIDTH] IN BLOOD BY AUTOMATED COUNT: 43.8 FL (ref 35.9–50)
GFR SERPLBLD CREATININE-BSD FMLA CKD-EPI: 94 ML/MIN/1.73 M 2
GLOBULIN SER CALC-MCNC: 2.8 G/DL (ref 1.9–3.5)
GLUCOSE SERPL-MCNC: 88 MG/DL (ref 65–99)
HCT VFR BLD AUTO: 42 % (ref 37–47)
HDLC SERPL-MCNC: 65 MG/DL
HGB BLD-MCNC: 14.1 G/DL (ref 12–16)
IMM GRANULOCYTES # BLD AUTO: 0.01 K/UL (ref 0–0.11)
IMM GRANULOCYTES NFR BLD AUTO: 0.3 % (ref 0–0.9)
LDLC SERPL CALC-MCNC: 114 MG/DL
LYMPHOCYTES # BLD AUTO: 1.3 K/UL (ref 1–4.8)
LYMPHOCYTES NFR BLD: 35.9 % (ref 22–41)
MCH RBC QN AUTO: 32.1 PG (ref 27–33)
MCHC RBC AUTO-ENTMCNC: 33.6 G/DL (ref 32.2–35.5)
MCV RBC AUTO: 95.7 FL (ref 81.4–97.8)
MONOCYTES # BLD AUTO: 0.31 K/UL (ref 0–0.85)
MONOCYTES NFR BLD AUTO: 8.6 % (ref 0–13.4)
NEUTROPHILS # BLD AUTO: 1.87 K/UL (ref 1.82–7.42)
NEUTROPHILS NFR BLD: 51.6 % (ref 44–72)
NRBC # BLD AUTO: 0 K/UL
NRBC BLD-RTO: 0 /100 WBC (ref 0–0.2)
PLATELET # BLD AUTO: 377 K/UL (ref 164–446)
PMV BLD AUTO: 9.2 FL (ref 9–12.9)
POTASSIUM SERPL-SCNC: 4.4 MMOL/L (ref 3.6–5.5)
PROT SERPL-MCNC: 6.9 G/DL (ref 6–8.2)
RBC # BLD AUTO: 4.39 M/UL (ref 4.2–5.4)
SODIUM SERPL-SCNC: 135 MMOL/L (ref 135–145)
T3FREE SERPL-MCNC: 3.08 PG/ML (ref 2–4.4)
T4 FREE SERPL-MCNC: 1.1 NG/DL (ref 0.93–1.7)
TRIGL SERPL-MCNC: 103 MG/DL (ref 0–149)
TSH SERPL-ACNC: 3.93 UIU/ML (ref 0.35–5.5)
WBC # BLD AUTO: 3.6 K/UL (ref 4.8–10.8)

## 2025-01-29 PROCEDURE — 82306 VITAMIN D 25 HYDROXY: CPT

## 2025-01-29 PROCEDURE — 80053 COMPREHEN METABOLIC PANEL: CPT

## 2025-01-29 PROCEDURE — 84481 FREE ASSAY (FT-3): CPT

## 2025-01-29 PROCEDURE — 85025 COMPLETE CBC W/AUTO DIFF WBC: CPT

## 2025-01-29 PROCEDURE — 84443 ASSAY THYROID STIM HORMONE: CPT

## 2025-01-29 PROCEDURE — 80061 LIPID PANEL: CPT

## 2025-01-29 PROCEDURE — 84439 ASSAY OF FREE THYROXINE: CPT

## 2025-01-29 PROCEDURE — 36415 COLL VENOUS BLD VENIPUNCTURE: CPT

## 2025-01-31 ENCOUNTER — HOSPITAL ENCOUNTER (OUTPATIENT)
Dept: LAB | Facility: MEDICAL CENTER | Age: 75
End: 2025-01-31
Attending: STUDENT IN AN ORGANIZED HEALTH CARE EDUCATION/TRAINING PROGRAM
Payer: MEDICARE

## 2025-01-31 DIAGNOSIS — D50.0 IRON DEFICIENCY ANEMIA DUE TO CHRONIC BLOOD LOSS: ICD-10-CM

## 2025-01-31 PROCEDURE — 83550 IRON BINDING TEST: CPT

## 2025-01-31 PROCEDURE — 83540 ASSAY OF IRON: CPT

## 2025-01-31 PROCEDURE — 36415 COLL VENOUS BLD VENIPUNCTURE: CPT

## 2025-01-31 PROCEDURE — 82728 ASSAY OF FERRITIN: CPT

## 2025-02-01 LAB
FERRITIN SERPL-MCNC: 119 NG/ML (ref 10–291)
IRON SATN MFR SERPL: 32 % (ref 15–55)
IRON SERPL-MCNC: 88 UG/DL (ref 40–170)
TIBC SERPL-MCNC: 273 UG/DL (ref 250–450)
UIBC SERPL-MCNC: 185 UG/DL (ref 110–370)

## 2025-02-04 ENCOUNTER — APPOINTMENT (OUTPATIENT)
Dept: MEDICAL GROUP | Age: 75
End: 2025-02-04
Payer: MEDICARE

## 2025-02-04 VITALS
WEIGHT: 115 LBS | HEIGHT: 60 IN | OXYGEN SATURATION: 99 % | BODY MASS INDEX: 22.58 KG/M2 | TEMPERATURE: 97.6 F | DIASTOLIC BLOOD PRESSURE: 80 MMHG | SYSTOLIC BLOOD PRESSURE: 130 MMHG | HEART RATE: 72 BPM

## 2025-02-04 DIAGNOSIS — E78.00 HYPERCHOLESTEROLEMIA: ICD-10-CM

## 2025-02-04 DIAGNOSIS — E55.9 VITAMIN D DEFICIENCY: ICD-10-CM

## 2025-02-04 DIAGNOSIS — I10 ESSENTIAL HYPERTENSION: ICD-10-CM

## 2025-02-04 PROCEDURE — 99214 OFFICE O/P EST MOD 30 MIN: CPT | Performed by: FAMILY MEDICINE

## 2025-02-04 PROCEDURE — 3079F DIAST BP 80-89 MM HG: CPT | Performed by: FAMILY MEDICINE

## 2025-02-04 PROCEDURE — 3075F SYST BP GE 130 - 139MM HG: CPT | Performed by: FAMILY MEDICINE

## 2025-02-04 ASSESSMENT — FIBROSIS 4 INDEX: FIB4 SCORE: 1.05

## 2025-02-04 ASSESSMENT — PATIENT HEALTH QUESTIONNAIRE - PHQ9: CLINICAL INTERPRETATION OF PHQ2 SCORE: 0

## 2025-02-04 NOTE — PROGRESS NOTES
This medical record contains text that has been entered with the assistance of computer voice recognition and dictation software.  Therefore, it may contain unintended errors in text, spelling, punctuation, or grammar      Verbal consent was acquired by the patient to use [employer] ambient listening note generation during this visit Yes       Chief Complaint   Patient presents with    Follow-Up     6 month          Alysa Sarabia is a 74 y.o. female here evaluation and management of: follow up      HPI:           1. Essential hypertension  Losartan 100 mg p.o. daily  Amlodipine 2.5 mg p.o. daily      Overall the patient has been compliant with his medical regimen, denies any adverse side effects such as cough, no syncope, no presyncope, no excessive fatigue.  The patient denies any chest pain today no headaches.    2. Hypercholesterolemia  The patient is reluctant to start any medication for hyperlipidemia.     Latest Reference Range & Units 01/29/25 07:52   Cholesterol,Tot 100 - 199 mg/dL 200 (H)   Triglycerides 0 - 149 mg/dL 103   HDL >=40 mg/dL 65   LDL <100 mg/dL 114 (H)   (H): Data is abnormally high      Current medicines (including changes today)  Current Outpatient Medications   Medication Sig Dispense Refill    losartan (COZAAR) 100 MG Tab Take 1 Tablet by mouth every day. 100 Tablet 2    ferrous sulfate 325 (65 Fe) MG tablet Take 1 Tablet by mouth every day. 30 Tablet 2    amLODIPine (NORVASC) 2.5 MG Tab TAKE 1 TABLET BY MOUTH AT BEDTIME 100 Tablet 2    denosumab (PROLIA) 60 MG/ML Solution Prefilled Syringe injection Inject 1 mL under the skin every 6 months. 1 mL 1    clotrimazole-betamethasone (LOTRISONE) 1-0.05 % Cream Apply 1 g topically.      LUTEIN PO Take  by mouth.      Multiple Vitamins-Minerals (PRESERVISION AREDS PO) Take 1 tablet by mouth 2 times a day.      Cholecalciferol (VITAMIN D3) 125 MCG (5000 UT) Tab Take 1 capsule by mouth every day.      CALCIUM PO Take 1 tablet by mouth  every day.       No current facility-administered medications for this visit.     She  has a past medical history of Hydronephrosis, Hypertension, Hyponatremia, Leukopenia, Liver cyst, Neutropenia (HCC), Small bowel obstruction (HCC), and Varicose vein of leg.  She  has a past surgical history that includes breast biopsy (); colon resection laparoscopic (3/27/2014); exploratory laparotomy (5/3/2016); cystoscopy stent placement (Right, 2016); retrogrades (2016); exploratory laparotomy (2016); and lumpectomy (Left, ).  Social History     Tobacco Use    Smoking status: Never    Smokeless tobacco: Never   Vaping Use    Vaping status: Never Used   Substance Use Topics    Alcohol use: Yes     Alcohol/week: 1.8 oz     Types: 3 Glasses of wine per week     Comment: 3 a week    Drug use: No     Social History     Social History Narrative    Lives with .     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[place] [employer]     Family History   Problem Relation Age of Onset    Hypertension Mother     Heart Disease Father     Cancer Brother         neck    Cancer Maternal Grandmother     No Known Problems Maternal Grandfather     No Known Problems Paternal Grandmother     No Known Problems Paternal Grandfather     Alcohol/Drug Neg Hx     Allergies Neg Hx     Diabetes Neg Hx     Psychiatric Illness Neg Hx     Stroke Neg Hx     Thyroid Neg Hx     Kidney Disease Neg Hx      Family Status   Relation Name Status    Mo  Alive    Fa  Alive    Bro  Alive    MGMo      MGFa      PGMo      PGFa      Neg Hx  (Not Specified)   No partnership data on file         ROS    The pertinent  ROS findings can be seen in the HPI above.     All other systems reviewed and are negative     Objective:     /80 (BP Location: Right arm, Patient Position: Sitting, BP Cuff Size: Adult)   Pulse 72   Temp 36.4 °C (97.6 °F) (Temporal)   Ht 1.524 m (5')   Wt 52.2 kg (115 lb)   SpO2 99%   Body mass index is 22.46 kg/m².      Physical Exam:    Constitutional: Alert, no distress.  Skin: No suspicious lesions  Eye: Equal, round and reactive, conjunctiva clear, lids normal.  ENMT: Lips without lesions, good dentition, oropharynx clear.  Neck: Trachea midline, no masses, no thyromegaly. No cervical or supraclavicular lymphadenopathy.  Respiratory: Unlabored respiratory effort, lungs clear to auscultation, no wheezes, no ronchi.  Cardiovascular: Normal S1, S2, no murmur, no edema  Abdomen: Soft, non-tender, no masses, no hepatosplenomegaly.        Assessment and Plan:   The following treatment plan was discussed    All recent labs and provider notes reviewed    Assessment & Plan  1. Elevated cholesterol.  LDL is not at goal but she is reluctant to start any medication.  As result, she has been advised to incorporate red yeast rice into her diet as a supplement, which can be procured from any pharmacy.    2. Vitamin D deficiency.  A prescription for vitamin D 50,000 units, to be taken once weekly, has been provided.    3. Uterine fibroid.  The uterine fibroid remains stable and benign, as confirmed by the CT scan with contrast conducted on 06/22/2024. She will continue to be monitored by her gynecologist, Dr. Danii Mejias, who will conduct further evaluations during her annual visit.        1. Essential hypertension    2. Hypercholesterolemia  - CBC WITH DIFFERENTIAL; Future  - Comp Metabolic Panel; Future  - Lipid Profile; Future  - TSH+FREE T4  - T3 FREE; Future    3. Vitamin D deficiency  - VITAMIN D,25 HYDROXY (DEFICIENCY); Future             Instructed to Follow up in clinic or ER for worsening symptoms, difficulty breathing, lack of expected recovery, or should new symptoms or problems arise.    Followup: Return in about 6 months (around 8/4/2025) for Reevaluation, labs.

## 2025-03-05 DIAGNOSIS — D50.0 IRON DEFICIENCY ANEMIA DUE TO CHRONIC BLOOD LOSS: ICD-10-CM

## 2025-03-05 RX ORDER — FERROUS SULFATE 325(65) MG
325 TABLET ORAL DAILY
Qty: 30 TABLET | Refills: 2 | Status: SHIPPED | OUTPATIENT
Start: 2025-03-05

## 2025-03-13 ENCOUNTER — OUTPATIENT INFUSION SERVICES (OUTPATIENT)
Dept: ONCOLOGY | Facility: MEDICAL CENTER | Age: 75
End: 2025-03-13
Attending: NURSE PRACTITIONER
Payer: MEDICARE

## 2025-03-13 VITALS
WEIGHT: 116.84 LBS | HEART RATE: 76 BPM | SYSTOLIC BLOOD PRESSURE: 141 MMHG | OXYGEN SATURATION: 98 % | HEIGHT: 62 IN | RESPIRATION RATE: 18 BRPM | BODY MASS INDEX: 21.5 KG/M2 | DIASTOLIC BLOOD PRESSURE: 82 MMHG | TEMPERATURE: 97 F

## 2025-03-13 DIAGNOSIS — M81.0 AGE-RELATED OSTEOPOROSIS WITHOUT CURRENT PATHOLOGICAL FRACTURE: ICD-10-CM

## 2025-03-13 LAB
CA-I BLD ISE-SCNC: 1.19 MMOL/L (ref 1.1–1.3)
CREAT BLD-MCNC: 0.6 MG/DL (ref 0.5–1.4)

## 2025-03-13 PROCEDURE — 82565 ASSAY OF CREATININE: CPT

## 2025-03-13 PROCEDURE — 82330 ASSAY OF CALCIUM: CPT

## 2025-03-13 PROCEDURE — 36415 COLL VENOUS BLD VENIPUNCTURE: CPT

## 2025-03-13 PROCEDURE — 700111 HCHG RX REV CODE 636 W/ 250 OVERRIDE (IP): Mod: JZ,TB | Performed by: NURSE PRACTITIONER

## 2025-03-13 PROCEDURE — 96372 THER/PROPH/DIAG INJ SC/IM: CPT

## 2025-03-13 RX ADMIN — DENOSUMAB 60 MG: 60 INJECTION SUBCUTANEOUS at 15:27

## 2025-03-13 ASSESSMENT — PAIN DESCRIPTION - PAIN TYPE: TYPE: ACUTE PAIN

## 2025-03-13 ASSESSMENT — FIBROSIS 4 INDEX: FIB4 SCORE: 1.05

## 2025-03-13 NOTE — PROGRESS NOTES
Alysa arrived ambulatory with family member to [facility] for every 6 month Prolia injection. Plan of care reviewed, assessment completed, patient reports tolerating past treatments well. Patient confirms taking vit D and calcium as directed, denies signs of hypocalcemia. Patient denies any oral surgery in the last 4 weeks with none scheduled.   Butterfly needle used to obtain labs from [facility], removed, site dressed with gauze and coban. Istat creatinine and calcium run and are within parameters, verified with pharmacist.   Prolia injection administered SQ to left back of arm, patient tolerated well, site dressed with band-aid. Next appointment confirmed with the patient, patient discharged home in [facility].

## 2025-03-18 ENCOUNTER — HOSPITAL ENCOUNTER (OUTPATIENT)
Dept: RADIOLOGY | Facility: MEDICAL CENTER | Age: 75
End: 2025-03-18
Attending: OBSTETRICS & GYNECOLOGY
Payer: MEDICARE

## 2025-03-18 DIAGNOSIS — Z12.31 VISIT FOR SCREENING MAMMOGRAM: ICD-10-CM

## 2025-03-18 PROCEDURE — 77067 SCR MAMMO BI INCL CAD: CPT

## 2025-04-23 ENCOUNTER — APPOINTMENT (OUTPATIENT)
Dept: ADMISSIONS | Facility: MEDICAL CENTER | Age: 75
End: 2025-04-23
Attending: INTERNAL MEDICINE
Payer: MEDICARE

## 2025-04-30 ENCOUNTER — PRE-ADMISSION TESTING (OUTPATIENT)
Dept: ADMISSIONS | Facility: MEDICAL CENTER | Age: 75
End: 2025-04-30
Attending: INTERNAL MEDICINE
Payer: MEDICARE

## 2025-04-30 VITALS — BODY MASS INDEX: 21.37 KG/M2 | HEIGHT: 62 IN

## 2025-04-30 DIAGNOSIS — Z01.812 PRE-OPERATIVE LABORATORY EXAMINATION: ICD-10-CM

## 2025-04-30 DIAGNOSIS — Z01.810 PRE-OPERATIVE CARDIOVASCULAR EXAMINATION: ICD-10-CM

## 2025-04-30 NOTE — PREADMIT AVS NOTE
Current Medications   Medication Instructions    ferrous sulfate 325 (65 Fe) MG tablet Stop 7 days before surgery    losartan (COZAAR) 100 MG Tab Stop 24 hours before surgery    amLODIPine (NORVASC) 2.5 MG Tab Continue taking medication as prescribed, including morning of procedure          clotrimazole-betamethasone (LOTRISONE) 1-0.05 % Cream Hold medication day of procedure    LUTEIN PO Stop 7 days before surgery    Multiple Vitamins-Minerals (PRESERVISION AREDS PO) Stop 7 days before surgery    Cholecalciferol (VITAMIN D3) 125 MCG (5000 UT) Tab Stop 7 days before surgery    CALCIUM PO Stop 7 days before surgery

## 2025-05-12 ENCOUNTER — PRE-ADMISSION TESTING (OUTPATIENT)
Dept: ADMISSIONS | Facility: MEDICAL CENTER | Age: 75
End: 2025-05-12
Attending: INTERNAL MEDICINE
Payer: MEDICARE

## 2025-05-12 DIAGNOSIS — Z01.812 PRE-OPERATIVE LABORATORY EXAMINATION: ICD-10-CM

## 2025-05-12 DIAGNOSIS — Z01.810 PRE-OPERATIVE CARDIOVASCULAR EXAMINATION: ICD-10-CM

## 2025-05-12 LAB
ANION GAP SERPL CALC-SCNC: 12 MMOL/L (ref 7–16)
BUN SERPL-MCNC: 10 MG/DL (ref 8–22)
CALCIUM SERPL-MCNC: 9 MG/DL (ref 8.5–10.5)
CHLORIDE SERPL-SCNC: 95 MMOL/L (ref 96–112)
CO2 SERPL-SCNC: 23 MMOL/L (ref 20–33)
CREAT SERPL-MCNC: 0.45 MG/DL (ref 0.5–1.4)
EKG IMPRESSION: NORMAL
ERYTHROCYTE [DISTWIDTH] IN BLOOD BY AUTOMATED COUNT: 41.4 FL (ref 35.9–50)
GFR SERPLBLD CREATININE-BSD FMLA CKD-EPI: 102 ML/MIN/1.73 M 2
GLUCOSE SERPL-MCNC: 97 MG/DL (ref 65–99)
HCT VFR BLD AUTO: 39.4 % (ref 37–47)
HGB BLD-MCNC: 13.8 G/DL (ref 12–16)
MCH RBC QN AUTO: 32.4 PG (ref 27–33)
MCHC RBC AUTO-ENTMCNC: 35 G/DL (ref 32.2–35.5)
MCV RBC AUTO: 92.5 FL (ref 81.4–97.8)
PLATELET # BLD AUTO: 343 K/UL (ref 164–446)
PMV BLD AUTO: 8.9 FL (ref 9–12.9)
POTASSIUM SERPL-SCNC: 4.2 MMOL/L (ref 3.6–5.5)
RBC # BLD AUTO: 4.26 M/UL (ref 4.2–5.4)
SODIUM SERPL-SCNC: 130 MMOL/L (ref 135–145)
WBC # BLD AUTO: 4 K/UL (ref 4.8–10.8)

## 2025-05-12 PROCEDURE — 85027 COMPLETE CBC AUTOMATED: CPT

## 2025-05-12 PROCEDURE — 93005 ELECTROCARDIOGRAM TRACING: CPT | Mod: TC

## 2025-05-12 PROCEDURE — 80048 BASIC METABOLIC PNL TOTAL CA: CPT

## 2025-05-12 PROCEDURE — 36415 COLL VENOUS BLD VENIPUNCTURE: CPT

## 2025-05-12 PROCEDURE — 93010 ELECTROCARDIOGRAM REPORT: CPT | Performed by: INTERNAL MEDICINE

## 2025-05-20 ENCOUNTER — OFFICE VISIT (OUTPATIENT)
Dept: MEDICAL GROUP | Age: 75
End: 2025-05-20
Payer: MEDICARE

## 2025-05-20 VITALS
DIASTOLIC BLOOD PRESSURE: 76 MMHG | WEIGHT: 115 LBS | HEART RATE: 69 BPM | BODY MASS INDEX: 21.16 KG/M2 | TEMPERATURE: 97.4 F | HEIGHT: 62 IN | OXYGEN SATURATION: 99 % | SYSTOLIC BLOOD PRESSURE: 120 MMHG

## 2025-05-20 DIAGNOSIS — E78.00 HYPERCHOLESTEROLEMIA: Primary | ICD-10-CM

## 2025-05-20 DIAGNOSIS — Z01.818 PREOPERATIVE CLEARANCE: ICD-10-CM

## 2025-05-20 PROCEDURE — 3074F SYST BP LT 130 MM HG: CPT | Performed by: FAMILY MEDICINE

## 2025-05-20 PROCEDURE — 99214 OFFICE O/P EST MOD 30 MIN: CPT | Performed by: FAMILY MEDICINE

## 2025-05-20 PROCEDURE — 3078F DIAST BP <80 MM HG: CPT | Performed by: FAMILY MEDICINE

## 2025-05-20 RX ORDER — ROSUVASTATIN CALCIUM 5 MG/1
5 TABLET, COATED ORAL EVERY EVENING
Qty: 100 TABLET | Refills: 3 | Status: SHIPPED | OUTPATIENT
Start: 2025-05-20 | End: 2026-06-24

## 2025-05-20 ASSESSMENT — FIBROSIS 4 INDEX: FIB4 SCORE: 1.16

## 2025-05-20 NOTE — PROGRESS NOTES
This medical record contains text that has been entered with the assistance of computer voice recognition and dictation software.  Therefore, it may contain unintended errors in text, spelling, punctuation, or grammar      Verbal consent was acquired by the patient to use [employer] ambient listening note generation during this visit Yes       Chief Complaint   Patient presents with    Results     EKG RESULTS FOR PRE--OP AND GI CONSULTANT PAPERWORK          Alysa Sarabia is a 74 y.o. female here evaluation and management of: preoperative clearance      HPI:     1. Preoperative clearance      2. Hypercholesterolemia        History of Present Illness  The patient, a 74-year-old female, presents for a preoperative evaluation in anticipation of a scheduled colonoscopy with dilatation.    Abdominal Bloating  She reports experiencing abdominal bloating for a duration exceeding 9 months. Her cardiologist has already provided the necessary preoperative clearance for the procedure.  - Duration: Exceeding 9 months.  - Character: Abdominal bloating.    Hypercholesterolemia  The patient expresses concern regarding her hypercholesterolemia, with current cholesterol levels approximating 200 mg/dL, and inquires about the initiation of pharmacotherapy for management.  - Character: Hypercholesterolemia with cholesterol levels around 200 mg/dL.    Ophthalmologic Evaluation  Additionally, she mentions the need for an ophthalmologic evaluation. She reports her current visual acuity as 20/20 and expresses concerns regarding the potential development of cataracts.  - Character: Concerns about potential cataracts.  - Severity: Current visual acuity is 20/20.           Results        Latest Reference Range & Units 01/29/25 07:52   Cholesterol,Tot 100 - 199 mg/dL 200 (H)   Triglycerides 0 - 149 mg/dL 103   HDL >=40 mg/dL 65   LDL <100 mg/dL 114 (H)   (H): Data is abnormally high      Current medicines (including changes  "today)  Current Medications[1]  She  has a past medical history of Hydronephrosis, Hypertension, Hyponatremia, Leukopenia, Liver cyst, Neutropenia (HCC) (2019), Small bowel obstruction (HCC), and Varicose vein of leg.  She  has a past surgical history that includes breast biopsy (); colon resection laparoscopic (3/27/2014); exploratory laparotomy (5/3/2016); cystoscopy stent placement (Right, 2016); retrogrades (2016); exploratory laparotomy (2016); and lumpectomy (Left, ).  Social History[2]  Social History     Social History Narrative    Lives with .     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[employer]     Family History   Problem Relation Age of Onset    Hypertension Mother     Heart Disease Father     Cancer Brother         neck    Cancer Maternal Grandmother     No Known Problems Maternal Grandfather     No Known Problems Paternal Grandmother     No Known Problems Paternal Grandfather     Alcohol/Drug Neg Hx     Allergies Neg Hx     Diabetes Neg Hx     Psychiatric Illness Neg Hx     Stroke Neg Hx     Thyroid Neg Hx     Kidney Disease Neg Hx      Family Status   Relation Name Status    Mo  Alive    Fa  Alive    Bro  Alive    MGMo      MGFa      PGMo      PGFa      Neg Hx  (Not Specified)   No partnership data on file         ROS    The pertinent  ROS findings can be seen in the HPI above.     All other systems reviewed and are negative     Objective:     /76 (BP Location: Left arm, Patient Position: Sitting, BP Cuff Size: Adult)   Pulse 69   Temp 36.3 °C (97.4 °F) (Temporal)   Ht 1.575 m (5' 2\")   Wt 52.2 kg (115 lb)   SpO2 99%  Body mass index is 21.03 kg/m².      Physical Exam:    Constitutional: Alert, no distress.  Skin: No suspicious lesions  Eye: Equal, round and reactive, conjunctiva clear, lids normal.  ENMT: Lips without lesions, good dentition, oropharynx clear.  Neck: Trachea midline, no masses, no " thyromegaly. No cervical or supraclavicular lymphadenopathy.  Respiratory: Unlabored respiratory effort, lungs clear to auscultation, no wheezes, no ronchi.  Cardiovascular: Normal S1, S2, no murmur, no edema  Abdomen: Soft, non-tender, no masses, no hepatosplenomegaly.      Assessment and Plan:   The following treatment plan was discussed    All recent labs and provider notes reviewed      Assessment & Plan  Preoperative evaluation  Patient is scheduled for a colonoscopy to address abdominal distention. Cardiologist has sent the necessary medical clearance. Medical clearance will be faxed to the relevant department, and the original document will be provided. Patient has been experiencing symptoms for more than 9 months.  Diagnostic plan: Colonoscopy.    Hypercholesterolemia  Cholesterol levels are around 200. Previous medication was effective for another family member. Discussed prescribing a low-dose rosuvastatin 5mg Prescription will be sent to pharmacy.  Treatment plan: Low-dose cholesterol-lowering medication.    Eye examination  Patient's vision is currently 20/20. Cataracts are present. Eye examination will be conducted today. DME test form has been provided.  Diagnostic plan: Eye examination, DME test.    Follow-up: Next scheduled visit.     1. Preoperative clearance    2. Hypercholesterolemia  - rosuvastatin (CRESTOR) 5 MG Tab; Take 1 Tablet by mouth every evening.  Dispense: 100 Tablet; Refill: 3             Instructed to Follow up in clinic or ER for worsening symptoms, difficulty breathing, lack of expected recovery, or should new symptoms or problems arise.    Followup: Return in about 6 months (around 11/20/2025) for Reevaluation.                    [1]   Current Outpatient Medications   Medication Sig Dispense Refill    rosuvastatin (CRESTOR) 5 MG Tab Take 1 Tablet by mouth every evening. 100 Tablet 3    ferrous sulfate 325 (65 Fe) MG tablet Take 1 Tablet by mouth every day. 30 Tablet 2    losartan  (COZAAR) 100 MG Tab Take 1 Tablet by mouth every day. 100 Tablet 2    amLODIPine (NORVASC) 2.5 MG Tab TAKE 1 TABLET BY MOUTH AT BEDTIME 100 Tablet 2    denosumab (PROLIA) 60 MG/ML Solution Prefilled Syringe injection Inject 1 mL under the skin every 6 months. 1 mL 1    clotrimazole-betamethasone (LOTRISONE) 1-0.05 % Cream Apply 1 g topically.      LUTEIN PO Take  by mouth.      Multiple Vitamins-Minerals (PRESERVISION AREDS PO) Take 1 tablet by mouth 2 times a day.      Cholecalciferol (VITAMIN D3) 125 MCG (5000 UT) Tab Take 1 capsule by mouth every day.      CALCIUM PO Take 1 tablet by mouth every day.       No current facility-administered medications for this visit.   [2]   Social History  Tobacco Use    Smoking status: Never    Smokeless tobacco: Never   Vaping Use    Vaping status: Never Used   Substance Use Topics    Alcohol use: Not Currently     Alcohol/week: 1.8 oz     Types: 3 Glasses of wine per week     Comment: 3 a week    Drug use: No

## 2025-05-21 ENCOUNTER — ANESTHESIA (OUTPATIENT)
Dept: SURGERY | Facility: MEDICAL CENTER | Age: 75
End: 2025-05-21
Payer: MEDICARE

## 2025-05-21 ENCOUNTER — ANESTHESIA EVENT (OUTPATIENT)
Dept: SURGERY | Facility: MEDICAL CENTER | Age: 75
End: 2025-05-21
Payer: MEDICARE

## 2025-05-21 ENCOUNTER — HOSPITAL ENCOUNTER (OUTPATIENT)
Facility: MEDICAL CENTER | Age: 75
End: 2025-05-21
Attending: INTERNAL MEDICINE | Admitting: INTERNAL MEDICINE
Payer: MEDICARE

## 2025-05-21 VITALS
WEIGHT: 113.21 LBS | OXYGEN SATURATION: 95 % | BODY MASS INDEX: 20.83 KG/M2 | DIASTOLIC BLOOD PRESSURE: 79 MMHG | TEMPERATURE: 96.8 F | SYSTOLIC BLOOD PRESSURE: 121 MMHG | HEIGHT: 62 IN | HEART RATE: 68 BPM | RESPIRATION RATE: 16 BRPM

## 2025-05-21 LAB — PATHOLOGY CONSULT NOTE: NORMAL

## 2025-05-21 PROCEDURE — 160025 RECOVERY II MINUTES (STATS): Performed by: INTERNAL MEDICINE

## 2025-05-21 PROCEDURE — 160035 HCHG PACU - 1ST 60 MINS PHASE I: Performed by: INTERNAL MEDICINE

## 2025-05-21 PROCEDURE — 160046 HCHG PACU - 1ST 60 MINS PHASE II: Performed by: INTERNAL MEDICINE

## 2025-05-21 PROCEDURE — 700111 HCHG RX REV CODE 636 W/ 250 OVERRIDE (IP): Performed by: ANESTHESIOLOGY

## 2025-05-21 PROCEDURE — C1726 CATH, BAL DIL, NON-VASCULAR: HCPCS | Performed by: INTERNAL MEDICINE

## 2025-05-21 PROCEDURE — 160015 HCHG STAT PREOP MINUTES: Performed by: INTERNAL MEDICINE

## 2025-05-21 PROCEDURE — 700101 HCHG RX REV CODE 250: Performed by: ANESTHESIOLOGY

## 2025-05-21 PROCEDURE — 160002 HCHG RECOVERY MINUTES (STAT): Performed by: INTERNAL MEDICINE

## 2025-05-21 PROCEDURE — 160208 HCHG ENDO MINUTES - EA ADDL 1 MIN LEVEL 4: Performed by: INTERNAL MEDICINE

## 2025-05-21 PROCEDURE — 88305 TISSUE EXAM BY PATHOLOGIST: CPT | Mod: 26 | Performed by: PATHOLOGY

## 2025-05-21 PROCEDURE — 160048 HCHG OR STATISTICAL LEVEL 1-5: Performed by: INTERNAL MEDICINE

## 2025-05-21 PROCEDURE — 160203 HCHG ENDO MINUTES - 1ST 30 MINS LEVEL 4: Performed by: INTERNAL MEDICINE

## 2025-05-21 PROCEDURE — 160009 HCHG ANES TIME/MIN: Performed by: INTERNAL MEDICINE

## 2025-05-21 PROCEDURE — 88305 TISSUE EXAM BY PATHOLOGIST: CPT | Performed by: PATHOLOGY

## 2025-05-21 PROCEDURE — 700105 HCHG RX REV CODE 258: Performed by: ANESTHESIOLOGY

## 2025-05-21 RX ORDER — OXYCODONE HCL 5 MG/5 ML
5 SOLUTION, ORAL ORAL
Status: DISCONTINUED | OUTPATIENT
Start: 2025-05-21 | End: 2025-05-21 | Stop reason: HOSPADM

## 2025-05-21 RX ORDER — OXYCODONE HCL 5 MG/5 ML
10 SOLUTION, ORAL ORAL
Status: DISCONTINUED | OUTPATIENT
Start: 2025-05-21 | End: 2025-05-21 | Stop reason: HOSPADM

## 2025-05-21 RX ORDER — LABETALOL HYDROCHLORIDE 5 MG/ML
5 INJECTION, SOLUTION INTRAVENOUS
Status: DISCONTINUED | OUTPATIENT
Start: 2025-05-21 | End: 2025-05-21 | Stop reason: HOSPADM

## 2025-05-21 RX ORDER — HYDROMORPHONE HYDROCHLORIDE 1 MG/ML
0.1 INJECTION, SOLUTION INTRAMUSCULAR; INTRAVENOUS; SUBCUTANEOUS
Status: DISCONTINUED | OUTPATIENT
Start: 2025-05-21 | End: 2025-05-21 | Stop reason: HOSPADM

## 2025-05-21 RX ORDER — HALOPERIDOL 5 MG/ML
1 INJECTION INTRAMUSCULAR
Status: DISCONTINUED | OUTPATIENT
Start: 2025-05-21 | End: 2025-05-21 | Stop reason: HOSPADM

## 2025-05-21 RX ORDER — LIDOCAINE HYDROCHLORIDE 20 MG/ML
INJECTION, SOLUTION EPIDURAL; INFILTRATION; INTRACAUDAL; PERINEURAL PRN
Status: DISCONTINUED | OUTPATIENT
Start: 2025-05-21 | End: 2025-05-21 | Stop reason: SURG

## 2025-05-21 RX ORDER — SODIUM CHLORIDE, SODIUM LACTATE, POTASSIUM CHLORIDE, CALCIUM CHLORIDE 600; 310; 30; 20 MG/100ML; MG/100ML; MG/100ML; MG/100ML
INJECTION, SOLUTION INTRAVENOUS CONTINUOUS
Status: DISCONTINUED | OUTPATIENT
Start: 2025-05-21 | End: 2025-05-21 | Stop reason: HOSPADM

## 2025-05-21 RX ORDER — ONDANSETRON 2 MG/ML
4 INJECTION INTRAMUSCULAR; INTRAVENOUS
Status: DISCONTINUED | OUTPATIENT
Start: 2025-05-21 | End: 2025-05-21 | Stop reason: HOSPADM

## 2025-05-21 RX ORDER — HYDROMORPHONE HYDROCHLORIDE 1 MG/ML
0.2 INJECTION, SOLUTION INTRAMUSCULAR; INTRAVENOUS; SUBCUTANEOUS
Status: DISCONTINUED | OUTPATIENT
Start: 2025-05-21 | End: 2025-05-21 | Stop reason: HOSPADM

## 2025-05-21 RX ORDER — EPHEDRINE SULFATE 50 MG/ML
INJECTION, SOLUTION INTRAVENOUS PRN
Status: DISCONTINUED | OUTPATIENT
Start: 2025-05-21 | End: 2025-05-21 | Stop reason: SURG

## 2025-05-21 RX ORDER — METOPROLOL TARTRATE 1 MG/ML
1 INJECTION, SOLUTION INTRAVENOUS
Status: DISCONTINUED | OUTPATIENT
Start: 2025-05-21 | End: 2025-05-21 | Stop reason: HOSPADM

## 2025-05-21 RX ORDER — ALBUTEROL SULFATE 5 MG/ML
2.5 SOLUTION RESPIRATORY (INHALATION)
Status: DISCONTINUED | OUTPATIENT
Start: 2025-05-21 | End: 2025-05-21 | Stop reason: HOSPADM

## 2025-05-21 RX ORDER — EPHEDRINE SULFATE 50 MG/ML
5 INJECTION, SOLUTION INTRAVENOUS
Status: DISCONTINUED | OUTPATIENT
Start: 2025-05-21 | End: 2025-05-21 | Stop reason: HOSPADM

## 2025-05-21 RX ORDER — HYDROMORPHONE HYDROCHLORIDE 1 MG/ML
0.4 INJECTION, SOLUTION INTRAMUSCULAR; INTRAVENOUS; SUBCUTANEOUS
Status: DISCONTINUED | OUTPATIENT
Start: 2025-05-21 | End: 2025-05-21 | Stop reason: HOSPADM

## 2025-05-21 RX ORDER — SODIUM CHLORIDE, SODIUM LACTATE, POTASSIUM CHLORIDE, CALCIUM CHLORIDE 600; 310; 30; 20 MG/100ML; MG/100ML; MG/100ML; MG/100ML
INJECTION, SOLUTION INTRAVENOUS
Status: DISCONTINUED | OUTPATIENT
Start: 2025-05-21 | End: 2025-05-21 | Stop reason: SURG

## 2025-05-21 RX ORDER — HYDRALAZINE HYDROCHLORIDE 20 MG/ML
5 INJECTION INTRAMUSCULAR; INTRAVENOUS
Status: DISCONTINUED | OUTPATIENT
Start: 2025-05-21 | End: 2025-05-21 | Stop reason: HOSPADM

## 2025-05-21 RX ADMIN — PROPOFOL 20 MG: 10 INJECTION, EMULSION INTRAVENOUS at 08:30

## 2025-05-21 RX ADMIN — SODIUM CHLORIDE, POTASSIUM CHLORIDE, SODIUM LACTATE AND CALCIUM CHLORIDE: 600; 310; 30; 20 INJECTION, SOLUTION INTRAVENOUS at 08:03

## 2025-05-21 RX ADMIN — LIDOCAINE HYDROCHLORIDE 50 MG: 20 INJECTION, SOLUTION EPIDURAL; INFILTRATION; INTRACAUDAL; PERINEURAL at 08:07

## 2025-05-21 RX ADMIN — FENTANYL CITRATE 50 MCG: 50 INJECTION, SOLUTION INTRAMUSCULAR; INTRAVENOUS at 08:07

## 2025-05-21 RX ADMIN — PROPOFOL 20 MG: 10 INJECTION, EMULSION INTRAVENOUS at 08:34

## 2025-05-21 RX ADMIN — PROPOFOL 20 MG: 10 INJECTION, EMULSION INTRAVENOUS at 08:14

## 2025-05-21 RX ADMIN — FENTANYL CITRATE 50 MCG: 50 INJECTION, SOLUTION INTRAMUSCULAR; INTRAVENOUS at 08:12

## 2025-05-21 RX ADMIN — PROPOFOL 20 MG: 10 INJECTION, EMULSION INTRAVENOUS at 08:26

## 2025-05-21 RX ADMIN — PROPOFOL 20 MG: 10 INJECTION, EMULSION INTRAVENOUS at 08:22

## 2025-05-21 RX ADMIN — PROPOFOL 30 MG: 10 INJECTION, EMULSION INTRAVENOUS at 08:07

## 2025-05-21 RX ADMIN — PROPOFOL 20 MG: 10 INJECTION, EMULSION INTRAVENOUS at 08:09

## 2025-05-21 RX ADMIN — PROPOFOL 20 MG: 10 INJECTION, EMULSION INTRAVENOUS at 08:18

## 2025-05-21 RX ADMIN — EPHEDRINE SULFATE 10 MG: 50 INJECTION, SOLUTION INTRAVENOUS at 08:11

## 2025-05-21 ASSESSMENT — PAIN DESCRIPTION - PAIN TYPE
TYPE: SURGICAL PAIN

## 2025-05-21 ASSESSMENT — FIBROSIS 4 INDEX: FIB4 SCORE: 1.16

## 2025-05-21 ASSESSMENT — PAIN SCALES - GENERAL: PAIN_LEVEL: 0

## 2025-05-21 NOTE — PROCEDURES
DATE OF PROCEDURE:  05/21/2025     PROCEDURE:  Colonoscopy with balloon dilation, foreign body retrieval, and   biopsy.     ENDOSCOPIST:  Cayetano Lucero MD     INDICATIONS:  Abnormal CT scan and hematochezia with colonic stricture and   retained video capsule.     MEDICATIONS:  The patient received monitored anesthesia care by Dr. Zeeshan Neal.  Please see anesthesia flow sheet for details.     DESCRIPTION OF PROCEDURE:  The patient and  were informed in detail of   the indications as well as the risks, benefits, and alternatives of the   procedure and they indicated understanding of all of this and agreed to   proceed.  Consent was signed and placed on the chart.  After the patient was   deemed adequately sedated, a standard Olympus variable stiffness pediatric   colonoscope was lubricated and gently placed in the anus and from here   carefully maneuvered throughout the colon into the transverse colon where the   known anastomosis is.  Quality of preparation was good.  We were unable to   traverse the stricture and at this point through the scope, balloon was   utilized to dilate the stricture from 10 to 12 mm.  We were still unable to   pass through the stricture including using the balloon catheter as a wire, so   an additional balloon was utilized to dilate up to 13.5 mm and at this point,   we were able to intubate through the stricture.  The scope was then traversed   approximately 20 cm into the terminal ileum.  No abnormalities were   appreciated with the exception of the retained capsule sitting just distal to   the stricture.  We then utilized a Torres net to grab the capsule and then   easily removed this and pull it out of the entire colon.  We then reintroduced   the scope traversing back to the anastomotic stricture in the transverse   colon and multiple biopsies were obtained of the stricture.  The scope was   then slowly and carefully withdrawn looking at mucosa in a circumferential    methodic manner.  No additional findings were appreciated other than a few   scattered diverticulae in the left colon.  Retroflexion was performed.  The   scope was then withdrawn after suctioning out excess air and the procedure   terminated.     FINDINGS:  1.  Anastomotic stricture of the transverse colon, a proximal luminal diameter   of 9 mm.  2.  Retained capsule distal to the stricture.  3.  Mild left-sided diverticulosis.     COMPLICATIONS:  None.     TISSUE/BIOPSIES:  Transverse colon anastomotic stricture.     THERAPY:  1.  Balloon dilation performed of the ileal colonic end-to-end anastomosis as   above.  2.  Foreign body removal of video capsule and endoscopy capsule.     IMPRESSION/PLAN/MEDICAL DECISION MAKIN.  Abnormal CT scan previously noted retained capsule, now status post   removal and dilation of the anastomotic stricture.  We will send the patient   pathology letter with the results of the biopsies, although does not appear   concerning.  We will have her follow up with us in clinic as planned.  2.  Hematochezia.  3.  Retained video capsule.  4.  Colonic anastomotic stricture.        ______________________________  MD KEANU ANDRADE/ZURI    DD:  2025 08:53  DT:  2025 09:14    Job#:  [id number]

## 2025-05-21 NOTE — OR NURSING
"0924 Received report from RN. Pt arrived to phase II from PACU via gurney. Respirations are spontaneous and unlabored.      0940  is chair side. Pt is dressed and in recliner chair.     0955 Patient education completed, pt and family denies any further questions. PIV removed with tip intact. DC'd to care of responsible adult.  All personal belongings are with pt and family. Phase II uneventful stay.     1010 Pt ans  awaiting Dr Lucero for postop discussion. Notified PACU regarding request.     1028 Pt states, \"I will just MyChart the doctor with questions. I am ready to go home\".    1038 Transported pt via wheelchair to awaiting vehicle. Pt discharged from hospital.   "

## 2025-05-21 NOTE — ANESTHESIA PREPROCEDURE EVALUATION
Case: [id number] Date/Time: 05/21/25 0745    Procedure: COLONOSCOPY WITH DILATION (Abdomen)    Anesthesia type: MAC    Pre-op diagnosis: ABNORMAL CT    Location:  ENDOSCOPIC ULTRASOUND ROOM / SURGERY [facility]    Surgeons: Cayetano Lucero M.D.            Relevant Problems   CARDIAC   (positive) Aortic atherosclerosis (HCC)   (positive) Essential hypertension         (positive) Benign liver cyst       Physical Exam    Airway   Mallampati: II  TM distance: >3 FB  Neck ROM: full       Cardiovascular - normal exam  Rhythm: regular  Rate: normal    (-) murmur     Dental - normal exam           Pulmonary - normal examBreath sounds clear to auscultation     Abdominal    Neurological - normal exam                   Anesthesia Plan    ASA 2       Plan - MAC               Induction: intravenous      Pertinent diagnostic labs and testing reviewed    Informed Consent:    Anesthetic plan and risks discussed with patient.    Use of blood products discussed with: patient whom consented to blood products.

## 2025-05-21 NOTE — DISCHARGE INSTRUCTIONS
If any questions arise, call your provider.  If your provider is not available, please feel free to call the Surgical Center at [phone].    MEDICATIONS: Resume taking daily medication.  Take prescribed pain medication with food.  If no medication is prescribed, you may take non-aspirin pain medication if needed.  PAIN MEDICATION CAN BE VERY CONSTIPATING.  Take a stool softener or laxative such as senokot, pericolace, or milk of magnesia if needed.    Last pain medication given at     What to Expect Post Anesthesia    Rest and take it easy for the first 24 hours.  A responsible adult is recommended to remain with you during that time.  It is normal to feel sleepy.  We encourage you to not do anything that requires balance, judgment or coordination.    FOR 24 HOURS DO NOT:  Drive, operate machinery or run household appliances.  Drink beer or alcoholic beverages.  Make important decisions or sign legal documents.    To avoid nausea, slowly advance diet as tolerated, avoiding spicy or greasy foods for the first day.  Add more substantial food to your diet according to your provider's instructions.  Babies can be fed formula or breast milk as soon as they are hungry.  INCREASE FLUIDS AND FIBER TO AVOID CONSTIPATION.    MILD FLU-LIKE SYMPTOMS ARE NORMAL.  YOU MAY EXPERIENCE GENERALIZED MUSCLE ACHES, THROAT IRRITATION, HEADACHE AND/OR SOME NAUSEA.    Diet    Resume pre-operative diet upon discharge from the hospital. Depending on how you are feeling and whether you have nausea or not, you may wish to stay with a bland diet for the first few days. However, you can advance your diet as quickly as you feel ready.    PostOp Diagnosis: stricture and trans anastamosis - dilated to 13.5mm, biopsied and reatined capsule removed

## 2025-05-21 NOTE — ANESTHESIA POSTPROCEDURE EVALUATION
Patient: Alysa Sarabia    Procedure Summary       Date: 05/21/25 Room / Location:  ENDOSCOPIC ULTRASOUND ROOM / SURGERY [facility]    Anesthesia Start: 0803 Anesthesia Stop: 0842    Procedure: COLONOSCOPY WITH DILATION (Abdomen) Diagnosis:       Anastomotic stricture of colorectal region      (Anastomotic stricture of colorectal region [281613])    Surgeons: Cayetano Lucero M.D. Responsible Provider: Zeeshan Neal D.O.    Anesthesia Type: MAC ASA Status: 2            Final Anesthesia Type: MAC  Last vitals  BP   Blood Pressure : 121/81    Temp   36.5 °C (97.7 °F)    Pulse   76   Resp   16    SpO2   96 %      Anesthesia Post Evaluation    Patient location during evaluation: PACU  Patient participation: complete - patient participated  Level of consciousness: awake and alert  Pain score: 0    Airway patency: patent  Anesthetic complications: no  Cardiovascular status: hemodynamically stable  Respiratory status: acceptable  Hydration status: euvolemic    PONV: none          No notable events documented.     Nurse Pain Score: 0 (NPRS)

## 2025-05-21 NOTE — ANESTHESIA TIME REPORT
Anesthesia Start and Stop Event Times       Date Time Event    5/21/2025 0759 Ready for Procedure     0803 Anesthesia Start     0842 Anesthesia Stop          Responsible Staff  05/21/25      Name Role Begin End    Zeeshan Neal D.O. Anesth 0803 0842          Overtime Reason:  no overtime (within assigned shift)    Comments:

## 2025-05-21 NOTE — OR SURGEON
Immediate Post OP Note    PreOp Diagnosis: Colonic stricture with retained capsule. Anormal CT scan and hematochezia      PostOp Diagnosis: stricture and trans anastamosis - dilated to 13.5mm, biopsied and reatined capsule removed      Procedure(s):  COLONOSCOPY WITH DILATION - Wound Class: Clean Contaminated    Surgeon(s):  Cayetano Lucero M.D.    Anesthesiologist/Type of Anesthesia:  Anesthesiologist: Zeeshan Nela D.O./MAC    Surgical Staff:  Circulator: Monico Canales R.N.  Endoscopy Technician: Paz Hylton; Kristine Crum    Specimens removed if any:  ID Type Source Tests Collected by Time Destination   A : stricture bx Tissue Colon - Transverse PATHOLOGY SPECIMEN Cayetano Lucero M.D. 5/21/2025  8:28 AM        Estimated Blood Loss: minimal    Findings: see post op above    Complications: none            5/21/2025 8:43 AM Cayetano Lucero M.D.

## 2025-05-21 NOTE — OR NURSING
0841 Pt arrived to PACU from OR via gurney. Report received from anesthesia and RN. Respirations are spontaneous and unlabored. VSS on 6L. Dressing is CDI. Cold pack applied.    0845 c/o 0/10 pain.     0853 Family updated.    0915 Pt meets criteria for transfer to Stage 2.

## 2025-06-13 ENCOUNTER — PATIENT MESSAGE (OUTPATIENT)
Dept: MEDICAL GROUP | Age: 75
End: 2025-06-13
Payer: MEDICARE

## 2025-06-13 DIAGNOSIS — E55.9 VITAMIN D DEFICIENCY: ICD-10-CM

## 2025-06-13 DIAGNOSIS — D50.0 IRON DEFICIENCY ANEMIA DUE TO CHRONIC BLOOD LOSS: ICD-10-CM

## 2025-06-13 DIAGNOSIS — E78.00 HYPERCHOLESTEROLEMIA: Primary | ICD-10-CM

## 2025-06-13 RX ORDER — FERROUS SULFATE 325(65) MG
325 TABLET ORAL DAILY
Qty: 30 TABLET | Refills: 2 | Status: SHIPPED | OUTPATIENT
Start: 2025-06-13

## 2025-06-18 ENCOUNTER — HOSPITAL ENCOUNTER (OUTPATIENT)
Dept: LAB | Facility: MEDICAL CENTER | Age: 75
End: 2025-06-18
Attending: STUDENT IN AN ORGANIZED HEALTH CARE EDUCATION/TRAINING PROGRAM
Payer: MEDICARE

## 2025-06-18 DIAGNOSIS — D50.0 IRON DEFICIENCY ANEMIA DUE TO CHRONIC BLOOD LOSS: ICD-10-CM

## 2025-06-18 LAB
BASOPHILS # BLD AUTO: 0.9 % (ref 0–1.8)
BASOPHILS # BLD: 0.05 K/UL (ref 0–0.12)
EOSINOPHIL # BLD AUTO: 0.03 K/UL (ref 0–0.51)
EOSINOPHIL NFR BLD: 0.5 % (ref 0–6.9)
ERYTHROCYTE [DISTWIDTH] IN BLOOD BY AUTOMATED COUNT: 42.8 FL (ref 35.9–50)
HCT VFR BLD AUTO: 38.8 % (ref 37–47)
HGB BLD-MCNC: 13.4 G/DL (ref 12–16)
IMM GRANULOCYTES # BLD AUTO: 0.01 K/UL (ref 0–0.11)
IMM GRANULOCYTES NFR BLD AUTO: 0.2 % (ref 0–0.9)
LYMPHOCYTES # BLD AUTO: 1.21 K/UL (ref 1–4.8)
LYMPHOCYTES NFR BLD: 21.9 % (ref 22–41)
MCH RBC QN AUTO: 32.3 PG (ref 27–33)
MCHC RBC AUTO-ENTMCNC: 34.5 G/DL (ref 32.2–35.5)
MCV RBC AUTO: 93.5 FL (ref 81.4–97.8)
MONOCYTES # BLD AUTO: 0.47 K/UL (ref 0–0.85)
MONOCYTES NFR BLD AUTO: 8.5 % (ref 0–13.4)
NEUTROPHILS # BLD AUTO: 3.76 K/UL (ref 1.82–7.42)
NEUTROPHILS NFR BLD: 68 % (ref 44–72)
NRBC # BLD AUTO: 0 K/UL
NRBC BLD-RTO: 0 /100 WBC (ref 0–0.2)
PLATELET # BLD AUTO: 338 K/UL (ref 164–446)
PMV BLD AUTO: 9 FL (ref 9–12.9)
RBC # BLD AUTO: 4.15 M/UL (ref 4.2–5.4)
WBC # BLD AUTO: 5.5 K/UL (ref 4.8–10.8)

## 2025-06-18 PROCEDURE — 85025 COMPLETE CBC W/AUTO DIFF WBC: CPT

## 2025-06-18 PROCEDURE — 36415 COLL VENOUS BLD VENIPUNCTURE: CPT

## 2025-06-30 ENCOUNTER — PATIENT MESSAGE (OUTPATIENT)
Dept: MEDICAL GROUP | Age: 75
End: 2025-06-30
Payer: MEDICARE

## 2025-06-30 DIAGNOSIS — D50.0 IRON DEFICIENCY ANEMIA DUE TO CHRONIC BLOOD LOSS: Primary | ICD-10-CM

## 2025-07-08 NOTE — Clinical Note
REFERRAL APPROVAL NOTICE         Sent on July 8, 2025                   Alysa Sarabia  [address]  [place] [address]                   Dear Ms. Sarabia,    After a careful review of the medical information and benefit coverage, [facility] has processed your referral. See below for additional details.    If applicable, you must be actively enrolled with your insurance for coverage of the authorized service. If you have any questions regarding your coverage, please contact your insurance directly.    REFERRAL INFORMATION   Referral #:  [id number]  Referred-To Provider    Referred-By Provider:  Gastroenterology    Zac Escobar M.D.   [facility]      [address] [place] [address] [phone] [address] [facility]  [place] [address]  [phone]    Referral Start Date:  07/01/2025  Referral End Date:   07/01/2026             SCHEDULING  If you do not already have an appointment, please call [phone] to make an appointment.     MORE INFORMATION  If you do not already have a [employer] account, sign up at: [email or web address]  You can access your medical information, make appointments, see lab results, billing information, and more.  If you have questions regarding this referral, please contact  the [facility] Referrals department at:             [phone]. Monday - Friday 8:00AM - 5:00PM.     Sincerely,    [facility]

## 2025-08-04 ENCOUNTER — HOSPITAL ENCOUNTER (OUTPATIENT)
Dept: LAB | Facility: MEDICAL CENTER | Age: 75
End: 2025-08-04
Attending: FAMILY MEDICINE
Payer: MEDICARE

## 2025-08-04 ENCOUNTER — HOSPITAL ENCOUNTER (OUTPATIENT)
Dept: LAB | Facility: MEDICAL CENTER | Age: 75
End: 2025-08-04
Attending: FAMILY MEDICINE

## 2025-08-04 DIAGNOSIS — Z00.6 CLINICAL TRIAL PARTICIPANT: ICD-10-CM

## 2025-08-04 DIAGNOSIS — E55.9 VITAMIN D DEFICIENCY: ICD-10-CM

## 2025-08-04 DIAGNOSIS — E78.00 HYPERCHOLESTEROLEMIA: ICD-10-CM

## 2025-08-04 LAB
25(OH)D3 SERPL-MCNC: 54 NG/ML (ref 30–100)
ALBUMIN SERPL BCP-MCNC: 4.4 G/DL (ref 3.2–4.9)
ALBUMIN/GLOB SERPL: 1.6 G/DL
ALP SERPL-CCNC: 51 U/L (ref 30–99)
ALT SERPL-CCNC: 24 U/L (ref 2–50)
ANION GAP SERPL CALC-SCNC: 9 MMOL/L (ref 7–16)
AST SERPL-CCNC: 31 U/L (ref 12–45)
BASOPHILS # BLD AUTO: 1.2 % (ref 0–1.8)
BASOPHILS # BLD: 0.04 K/UL (ref 0–0.12)
BILIRUB SERPL-MCNC: 1 MG/DL (ref 0.1–1.5)
BUN SERPL-MCNC: 12 MG/DL (ref 8–22)
CALCIUM ALBUM COR SERPL-MCNC: 8.9 MG/DL (ref 8.5–10.5)
CALCIUM SERPL-MCNC: 9.2 MG/DL (ref 8.5–10.5)
CHLORIDE SERPL-SCNC: 97 MMOL/L (ref 96–112)
CHOLEST SERPL-MCNC: 160 MG/DL (ref 100–199)
CO2 SERPL-SCNC: 25 MMOL/L (ref 20–33)
CREAT SERPL-MCNC: 0.53 MG/DL (ref 0.5–1.4)
EOSINOPHIL # BLD AUTO: 0.05 K/UL (ref 0–0.51)
EOSINOPHIL NFR BLD: 1.5 % (ref 0–6.9)
ERYTHROCYTE [DISTWIDTH] IN BLOOD BY AUTOMATED COUNT: 42.8 FL (ref 35.9–50)
FASTING STATUS PATIENT QL REPORTED: NORMAL
GFR SERPLBLD CREATININE-BSD FMLA CKD-EPI: 98 ML/MIN/1.73 M 2
GLOBULIN SER CALC-MCNC: 2.8 G/DL (ref 1.9–3.5)
GLUCOSE SERPL-MCNC: 87 MG/DL (ref 65–99)
HCT VFR BLD AUTO: 40.8 % (ref 37–47)
HDLC SERPL-MCNC: 65 MG/DL
HGB BLD-MCNC: 13.7 G/DL (ref 12–16)
IMM GRANULOCYTES # BLD AUTO: 0 K/UL (ref 0–0.11)
IMM GRANULOCYTES NFR BLD AUTO: 0 % (ref 0–0.9)
LDLC SERPL CALC-MCNC: 79 MG/DL
LYMPHOCYTES # BLD AUTO: 1.13 K/UL (ref 1–4.8)
LYMPHOCYTES NFR BLD: 33.4 % (ref 22–41)
MCH RBC QN AUTO: 31.6 PG (ref 27–33)
MCHC RBC AUTO-ENTMCNC: 33.6 G/DL (ref 32.2–35.5)
MCV RBC AUTO: 94.2 FL (ref 81.4–97.8)
MONOCYTES # BLD AUTO: 0.27 K/UL (ref 0–0.85)
MONOCYTES NFR BLD AUTO: 8 % (ref 0–13.4)
NEUTROPHILS # BLD AUTO: 1.89 K/UL (ref 1.82–7.42)
NEUTROPHILS NFR BLD: 55.9 % (ref 44–72)
NRBC # BLD AUTO: 0 K/UL
NRBC BLD-RTO: 0 /100 WBC (ref 0–0.2)
PLATELET # BLD AUTO: 378 K/UL (ref 164–446)
PMV BLD AUTO: 9 FL (ref 9–12.9)
POTASSIUM SERPL-SCNC: 4.1 MMOL/L (ref 3.6–5.5)
PROT SERPL-MCNC: 7.2 G/DL (ref 6–8.2)
RBC # BLD AUTO: 4.33 M/UL (ref 4.2–5.4)
SODIUM SERPL-SCNC: 131 MMOL/L (ref 135–145)
T3FREE SERPL-MCNC: 3.07 PG/ML (ref 2–4.4)
T4 FREE SERPL-MCNC: 1.17 NG/DL (ref 0.93–1.7)
TRIGL SERPL-MCNC: 80 MG/DL (ref 0–149)
TSH SERPL-ACNC: 3.03 UIU/ML (ref 0.38–5.33)
WBC # BLD AUTO: 3.4 K/UL (ref 4.8–10.8)

## 2025-08-04 PROCEDURE — 82306 VITAMIN D 25 HYDROXY: CPT

## 2025-08-04 PROCEDURE — 85025 COMPLETE CBC W/AUTO DIFF WBC: CPT

## 2025-08-04 PROCEDURE — 84443 ASSAY THYROID STIM HORMONE: CPT

## 2025-08-04 PROCEDURE — 36415 COLL VENOUS BLD VENIPUNCTURE: CPT

## 2025-08-04 PROCEDURE — 84481 FREE ASSAY (FT-3): CPT

## 2025-08-04 PROCEDURE — 84439 ASSAY OF FREE THYROXINE: CPT

## 2025-08-04 PROCEDURE — 80053 COMPREHEN METABOLIC PANEL: CPT

## 2025-08-04 PROCEDURE — 80061 LIPID PANEL: CPT

## 2025-08-08 ENCOUNTER — OFFICE VISIT (OUTPATIENT)
Dept: MEDICAL GROUP | Age: 75
End: 2025-08-08
Payer: MEDICARE

## 2025-08-08 VITALS
OXYGEN SATURATION: 96 % | SYSTOLIC BLOOD PRESSURE: 110 MMHG | HEIGHT: 62 IN | RESPIRATION RATE: 16 BRPM | HEART RATE: 68 BPM | WEIGHT: 115 LBS | DIASTOLIC BLOOD PRESSURE: 68 MMHG | BODY MASS INDEX: 21.16 KG/M2 | TEMPERATURE: 98.9 F

## 2025-08-08 DIAGNOSIS — G89.29 CHRONIC RIGHT SHOULDER PAIN: Primary | ICD-10-CM

## 2025-08-08 DIAGNOSIS — I10 ESSENTIAL HYPERTENSION: ICD-10-CM

## 2025-08-08 DIAGNOSIS — E87.1 HYPONATREMIA: ICD-10-CM

## 2025-08-08 DIAGNOSIS — M25.511 CHRONIC RIGHT SHOULDER PAIN: Primary | ICD-10-CM

## 2025-08-08 DIAGNOSIS — E78.00 HYPERCHOLESTEROLEMIA: ICD-10-CM

## 2025-08-08 PROCEDURE — 99214 OFFICE O/P EST MOD 30 MIN: CPT | Performed by: FAMILY MEDICINE

## 2025-08-08 PROCEDURE — 3078F DIAST BP <80 MM HG: CPT | Performed by: FAMILY MEDICINE

## 2025-08-08 PROCEDURE — 3074F SYST BP LT 130 MM HG: CPT | Performed by: FAMILY MEDICINE

## 2025-08-08 RX ORDER — LOSARTAN POTASSIUM 100 MG/1
100 TABLET ORAL DAILY
Qty: 100 TABLET | Refills: 2 | Status: SHIPPED | OUTPATIENT
Start: 2025-08-08

## 2025-08-08 RX ORDER — ROSUVASTATIN CALCIUM 5 MG/1
5 TABLET, COATED ORAL EVERY EVENING
Qty: 100 TABLET | Refills: 3 | Status: SHIPPED | OUTPATIENT
Start: 2025-08-08 | End: 2026-09-12

## 2025-08-08 RX ORDER — AMLODIPINE BESYLATE 2.5 MG/1
2.5 TABLET ORAL
Qty: 100 TABLET | Refills: 2 | Status: SHIPPED | OUTPATIENT
Start: 2025-08-08

## 2025-08-08 ASSESSMENT — FIBROSIS 4 INDEX: FIB4 SCORE: 1.22

## 2025-08-15 LAB
APOB+LDLR+PCSK9 GENE MUT ANL BLD/T: NOT DETECTED
BRCA1+BRCA2 DEL+DUP + FULL MUT ANL BLD/T: NOT DETECTED
MLH1+MSH2+MSH6+PMS2 GN DEL+DUP+FUL M: NOT DETECTED

## 2025-08-18 ENCOUNTER — RESULTS FOLLOW-UP (OUTPATIENT)
Dept: MEDICAL GROUP | Facility: PHYSICIAN GROUP | Age: 75
End: 2025-08-18
Payer: MEDICARE

## (undated) DEVICE — BALLOON CRE WG 12-15MM 240CM 5.5 F G

## (undated) DEVICE — SYRINGE DISP. 60 CC LL - (30/BX, 12BX/CA)**WHEN THESE ARE GONE ORDER #500206**

## (undated) DEVICE — SPONGE GAUZE NON-STERILE 4X4 - (2000/CA 10PK/CA)

## (undated) DEVICE — GOWN SURGEONS LARGE - (32/CA)

## (undated) DEVICE — TOWEL STOP TIMEOUT SAFETY FLAG (40EA/CA)

## (undated) DEVICE — SENSOR OXIMETER ADULT SPO2 RD SET (20EA/BX)

## (undated) DEVICE — WATER IRRIGATION STERILE 1000ML (12EA/CA)

## (undated) DEVICE — RESCUE RETRIEVAL NET (5EA/BX)

## (undated) DEVICE — CANNULA O2 COMFORT SOFT EAR ADULT 7 FT TUBING (50/CA)

## (undated) DEVICE — CANISTER SUCTION RIGID RED 1500CC (40EA/CA)

## (undated) DEVICE — PAD PREP 24 X 48 CUFFED - (100/CA)

## (undated) DEVICE — SYRINGE SAFETY 10 ML 18 GA X 1 1/2 BLUNT LL (100/BX 4BX/CA)

## (undated) DEVICE — TUBING CLEARLINK DUO-VENT - C-FLO (48EA/CA)

## (undated) DEVICE — KIT  I.V. START (100EA/CA)

## (undated) DEVICE — SET EXTENSION WITH 2 PORTS (48EA/CA) ***PART #2C8610 IS A SUBSTITUTE*****

## (undated) DEVICE — BALLOON CRE WG 10-12MM 240CM 5.5 F G

## (undated) DEVICE — TUBE CONNECTING SUCTION - CLEAR PLASTIC STERILE 72 IN (50EA/CA)

## (undated) DEVICE — KIT CUSTOM PROCEDURE SINGLE FOR ENDO (15/CA)

## (undated) DEVICE — LACTATED RINGERS INJ 1000 ML - (14EA/CA 60CA/PF)

## (undated) DEVICE — FORCEP RADIAL JAW 4 STANDARD CAPACITY W/NEEDLE 240CM (40EA/BX)